# Patient Record
Sex: FEMALE | Race: BLACK OR AFRICAN AMERICAN | Employment: OTHER | URBAN - METROPOLITAN AREA
[De-identification: names, ages, dates, MRNs, and addresses within clinical notes are randomized per-mention and may not be internally consistent; named-entity substitution may affect disease eponyms.]

---

## 2018-11-26 ENCOUNTER — HOSPITAL ENCOUNTER (INPATIENT)
Age: 68
LOS: 4 days | Discharge: HOME | DRG: 292 | End: 2018-11-30
Attending: EMERGENCY MEDICINE | Admitting: INTERNAL MEDICINE
Payer: MEDICARE

## 2018-11-26 ENCOUNTER — APPOINTMENT (OUTPATIENT)
Dept: GENERAL RADIOLOGY | Age: 68
DRG: 292 | End: 2018-11-26
Payer: MEDICARE

## 2018-11-26 DIAGNOSIS — R06.09 DYSPNEA ON EXERTION: Primary | ICD-10-CM

## 2018-11-26 DIAGNOSIS — J44.1 COPD EXACERBATION (HCC): ICD-10-CM

## 2018-11-26 DIAGNOSIS — I50.9 NEW ONSET OF CONGESTIVE HEART FAILURE (HCC): ICD-10-CM

## 2018-11-26 PROBLEM — I50.43 CHF (CONGESTIVE HEART FAILURE), NYHA CLASS I, ACUTE ON CHRONIC, COMBINED (HCC): Status: ACTIVE | Noted: 2018-11-26

## 2018-11-26 LAB
ANION GAP SERPL CALCULATED.3IONS-SCNC: 13 MMOL/L (ref 3–16)
BASOPHILS ABSOLUTE: 0.1 K/UL (ref 0–0.2)
BASOPHILS RELATIVE PERCENT: 0.9 %
BUN BLDV-MCNC: 13 MG/DL (ref 7–20)
CALCIUM SERPL-MCNC: 8.9 MG/DL (ref 8.3–10.6)
CHLORIDE BLD-SCNC: 109 MMOL/L (ref 99–110)
CO2: 22 MMOL/L (ref 21–32)
CREAT SERPL-MCNC: 1 MG/DL (ref 0.6–1.2)
EOSINOPHILS ABSOLUTE: 0.5 K/UL (ref 0–0.6)
EOSINOPHILS RELATIVE PERCENT: 6.5 %
GFR AFRICAN AMERICAN: >60
GFR NON-AFRICAN AMERICAN: 55
GLUCOSE BLD-MCNC: 100 MG/DL (ref 70–99)
HCT VFR BLD CALC: 40.5 % (ref 36–48)
HEMOGLOBIN: 13.2 G/DL (ref 12–16)
LYMPHOCYTES ABSOLUTE: 2.8 K/UL (ref 1–5.1)
LYMPHOCYTES RELATIVE PERCENT: 39.9 %
MCH RBC QN AUTO: 31.5 PG (ref 26–34)
MCHC RBC AUTO-ENTMCNC: 32.7 G/DL (ref 31–36)
MCV RBC AUTO: 96.4 FL (ref 80–100)
MONOCYTES ABSOLUTE: 0.5 K/UL (ref 0–1.3)
MONOCYTES RELATIVE PERCENT: 6.5 %
NEUTROPHILS ABSOLUTE: 3.2 K/UL (ref 1.7–7.7)
NEUTROPHILS RELATIVE PERCENT: 46.2 %
PDW BLD-RTO: 14.7 % (ref 12.4–15.4)
PLATELET # BLD: 204 K/UL (ref 135–450)
PMV BLD AUTO: 7.9 FL (ref 5–10.5)
POTASSIUM REFLEX MAGNESIUM: 4.2 MMOL/L (ref 3.5–5.1)
PRO-BNP: 2157 PG/ML (ref 0–124)
RBC # BLD: 4.2 M/UL (ref 4–5.2)
SODIUM BLD-SCNC: 144 MMOL/L (ref 136–145)
TROPONIN: <0.01 NG/ML
WBC # BLD: 7 K/UL (ref 4–11)

## 2018-11-26 PROCEDURE — 94640 AIRWAY INHALATION TREATMENT: CPT

## 2018-11-26 PROCEDURE — 6370000000 HC RX 637 (ALT 250 FOR IP): Performed by: EMERGENCY MEDICINE

## 2018-11-26 PROCEDURE — 71046 X-RAY EXAM CHEST 2 VIEWS: CPT

## 2018-11-26 PROCEDURE — 93005 ELECTROCARDIOGRAM TRACING: CPT | Performed by: EMERGENCY MEDICINE

## 2018-11-26 PROCEDURE — 84484 ASSAY OF TROPONIN QUANT: CPT

## 2018-11-26 PROCEDURE — 85025 COMPLETE CBC W/AUTO DIFF WBC: CPT

## 2018-11-26 PROCEDURE — 1200000000 HC SEMI PRIVATE

## 2018-11-26 PROCEDURE — 36415 COLL VENOUS BLD VENIPUNCTURE: CPT

## 2018-11-26 PROCEDURE — 94664 DEMO&/EVAL PT USE INHALER: CPT

## 2018-11-26 PROCEDURE — 94760 N-INVAS EAR/PLS OXIMETRY 1: CPT

## 2018-11-26 PROCEDURE — 99285 EMERGENCY DEPT VISIT HI MDM: CPT

## 2018-11-26 PROCEDURE — 83880 ASSAY OF NATRIURETIC PEPTIDE: CPT

## 2018-11-26 PROCEDURE — 6360000002 HC RX W HCPCS: Performed by: PHYSICIAN ASSISTANT

## 2018-11-26 PROCEDURE — 80048 BASIC METABOLIC PNL TOTAL CA: CPT

## 2018-11-26 RX ORDER — DICLOFENAC SODIUM 75 MG/1
75 TABLET, DELAYED RELEASE ORAL 2 TIMES DAILY
Status: ON HOLD | COMMUNITY
End: 2018-11-30 | Stop reason: HOSPADM

## 2018-11-26 RX ORDER — ALBUTEROL SULFATE 2.5 MG/3ML
5 SOLUTION RESPIRATORY (INHALATION) ONCE
Status: COMPLETED | OUTPATIENT
Start: 2018-11-26 | End: 2018-11-26

## 2018-11-26 RX ORDER — PREDNISONE 20 MG/1
60 TABLET ORAL ONCE
Status: COMPLETED | OUTPATIENT
Start: 2018-11-26 | End: 2018-11-26

## 2018-11-26 RX ORDER — LATANOPROST 50 UG/ML
1 SOLUTION/ DROPS OPHTHALMIC NIGHTLY
COMMUNITY

## 2018-11-26 RX ORDER — RANITIDINE 150 MG/1
150 TABLET ORAL 2 TIMES DAILY
COMMUNITY
End: 2020-05-26 | Stop reason: ALTCHOICE

## 2018-11-26 RX ORDER — IPRATROPIUM BROMIDE AND ALBUTEROL SULFATE 2.5; .5 MG/3ML; MG/3ML
1 SOLUTION RESPIRATORY (INHALATION) ONCE
Status: COMPLETED | OUTPATIENT
Start: 2018-11-26 | End: 2018-11-26

## 2018-11-26 RX ORDER — GABAPENTIN 800 MG/1
400 TABLET ORAL 3 TIMES DAILY
COMMUNITY
End: 2021-11-09

## 2018-11-26 RX ORDER — HYDROCHLOROTHIAZIDE 25 MG/1
25 TABLET ORAL DAILY
Status: ON HOLD | COMMUNITY
End: 2018-11-28 | Stop reason: ALTCHOICE

## 2018-11-26 RX ADMIN — PREDNISONE 60 MG: 20 TABLET ORAL at 14:00

## 2018-11-26 RX ADMIN — ALBUTEROL SULFATE 5 MG: 2.5 SOLUTION RESPIRATORY (INHALATION) at 15:30

## 2018-11-26 RX ADMIN — IPRATROPIUM BROMIDE AND ALBUTEROL SULFATE 1 AMPULE: .5; 3 SOLUTION RESPIRATORY (INHALATION) at 12:43

## 2018-11-26 ASSESSMENT — ENCOUNTER SYMPTOMS
ABDOMINAL PAIN: 0
SHORTNESS OF BREATH: 1
COUGH: 1
DIARRHEA: 0
BACK PAIN: 0
EYE PAIN: 0
VOMITING: 0
WHEEZING: 1
NAUSEA: 0

## 2018-11-26 NOTE — ED PROVIDER NOTES
and Pertinent negatives as per HPI. Except as noted abovein the ROS, all other systems were reviewed and negative. PAST MEDICAL HISTORY     Past Medical History:   Diagnosis Date    Hypertension          SURGICAL HISTORY     Past Surgical History:   Procedure Laterality Date    CARPAL TUNNEL RELEASE      CHOLECYSTECTOMY      KNEE SURGERY      TONSILLECTOMY      TUBAL LIGATION           CURRENTMEDICATIONS       Previous Medications    No medications on file         ALLERGIES     Asa [aspirin]; Pcn [penicillins]; Shellfish allergy; and Sulfa antibiotics    FAMILYHISTORY     History reviewed. No pertinent family history. SOCIAL HISTORY       Social History     Social History    Marital status:      Spouse name: N/A    Number of children: N/A    Years of education: N/A     Social History Main Topics    Smoking status: Current Every Day Smoker     Packs/day: 0.75     Types: Cigarettes    Smokeless tobacco: Never Used    Alcohol use No    Drug use: No    Sexual activity: Not Asked     Other Topics Concern    None     Social History Narrative    None       SCREENINGS             PHYSICAL EXAM    (up to 7 for level 4, 8 or more for level 5)     ED Triage Vitals [11/26/18 1144]   BP Temp Temp src Pulse Resp SpO2 Height Weight   116/73 97 °F (36.1 °C) -- 107 18 92 % 5' 6\" (1.676 m) 285 lb 11.5 oz (129.6 kg)       Physical Exam   Constitutional: She is oriented to person, place, and time. She appears well-developed. No distress. HENT:   Head: Normocephalic and atraumatic. Eyes: Right eye exhibits no discharge. Left eye exhibits no discharge. Neck: Normal range of motion. Neck supple. Pulmonary/Chest: No stridor. No respiratory distress. She has wheezes. Musculoskeletal: Normal range of motion. Neurological: She is alert and oriented to person, place, and time. No gross facial drooping. Moves all 4 extremities spontaneously. Skin: Skin is warm and dry. She is not diaphoretic. 11/26/2018 12:25 pm COMPARISON: Chest x-ray 07/19/2016 HISTORY: ORDERING SYSTEM PROVIDED HISTORY: Cough, SOB TECHNOLOGIST PROVIDED HISTORY: Reason for exam:->Cough, SOB Ordering Physician Provided Reason for Exam: Cough, SOB Acuity: Acute Type of Exam: Initial Additional signs and symptoms: Shortness of Breath (pt c/o difficulty breathing x2 weeks, denies pain) FINDINGS: Mild patchy opacities at the lower lungs. Costophrenic angles are clear. Cardiac silhouette appears enlarged. The bones are intact. Mild patchy opacities at the lower lungs could be due to edema, atelectasis, and/or pneumo. PROCEDURES   Unless otherwise noted below, none     Procedures    CRITICAL CARE TIME   N/A    CONSULTS:  None      EMERGENCY DEPARTMENT COURSE and DIFFERENTIALDIAGNOSIS/MDM:   Vitals:    Vitals:    11/26/18 1434 11/26/18 1531 11/26/18 1532 11/26/18 1547   BP: (!) 125/104  127/83 127/83   Pulse: 101   99   Resp: 23 16 18   Temp:    98.7 °F (37.1 °C)   TempSrc:    Oral   SpO2: 99% 100%  100%   Weight:       Height:           Patient was given thefollowing medications:  Medications   ipratropium-albuterol (DUONEB) nebulizer solution 1 ampule (1 ampule Inhalation Given 11/26/18 1243)   predniSONE (DELTASONE) tablet 60 mg (60 mg Oral Given 11/26/18 1400)   albuterol (PROVENTIL) nebulizer solution 5 mg (5 mg Nebulization Given 11/26/18 1530)         Differential Diagnosis: Acute COPD exacerbation, Hypoxemic Respiratory Distress, Pneumonia, Sepsis, Congestive Heart Failure, Myocardial Infarction, Pulmonary Embolus, ARDS, Pneumothorax, other    Patient is afebrile and nontoxic . Wheezing and decreased breath sounds on exam. Given Patient was given beta agonist nebulizers, steroids. CXR  mild patchy opacities at the lower lungs could be due to any edema atelectasis and/or ammonia. She is afebrile, no white count, no complaints of productive cough or subjective fever and I do not suspect pneumonia.   Her BNP was elevated at 2,157. She has no known history of CHF and he denies any prior workup with echocardiograms or provocative cardiac testing. After prednisone and breathing treatments, she continues to have wheezing on exam.  I will consult the hospitalist for admission for her dyspnea on exertion, possible new onset CHF and COPD. Results for orders placed or performed during the hospital encounter of 11/26/18   CBC Auto Differential   Result Value Ref Range    WBC 7.0 4.0 - 11.0 K/uL    RBC 4.20 4.00 - 5.20 M/uL    Hemoglobin 13.2 12.0 - 16.0 g/dL    Hematocrit 40.5 36.0 - 48.0 %    MCV 96.4 80.0 - 100.0 fL    MCH 31.5 26.0 - 34.0 pg    MCHC 32.7 31.0 - 36.0 g/dL    RDW 14.7 12.4 - 15.4 %    Platelets 596 052 - 631 K/uL    MPV 7.9 5.0 - 10.5 fL    Neutrophils % 46.2 %    Lymphocytes % 39.9 %    Monocytes % 6.5 %    Eosinophils % 6.5 %    Basophils % 0.9 %    Neutrophils # 3.2 1.7 - 7.7 K/uL    Lymphocytes # 2.8 1.0 - 5.1 K/uL    Monocytes # 0.5 0.0 - 1.3 K/uL    Eosinophils # 0.5 0.0 - 0.6 K/uL    Basophils # 0.1 0.0 - 0.2 K/uL   Basic Metabolic Panel w/ Reflex to MG   Result Value Ref Range    Sodium 144 136 - 145 mmol/L    Potassium reflex Magnesium 4.2 3.5 - 5.1 mmol/L    Chloride 109 99 - 110 mmol/L    CO2 22 21 - 32 mmol/L    Anion Gap 13 3 - 16    Glucose 100 (H) 70 - 99 mg/dL    BUN 13 7 - 20 mg/dL    CREATININE 1.0 0.6 - 1.2 mg/dL    GFR Non-African American 55 (A) >60    GFR African American >60 >60    Calcium 8.9 8.3 - 10.6 mg/dL   Troponin   Result Value Ref Range    Troponin <0.01 <0.01 ng/mL   Brain Natriuretic Peptide   Result Value Ref Range    Pro-BNP 2,157 (H) 0 - 124 pg/mL       I spoke with Dr. Soha Grant. We thoroughly discussed the history, physical exam, laboratory and imaging studies, as well as, emergency department course. Based upon that discussion, we've decided to admit [facility] for further observation and evaluation of Holli Siffel's dyspnea.   As I have deemed necessary from their history, physical, and studies, I have considered and evaluated Babs Sandoval for the following diagnoses:  ACUTE CORONARY SYNDROME, CHRONIC OBSTRUCTIVE PULMONARY DISEASE, CONGESTIVE HEART FAILURE, PERICARDIAL TAMPONADE, PNEUMONIA, PNEUMOTHORAX, PULMONARY EMBOLISM, SEPSIS, and THORACIC DISSECTION. FINAL IMPRESSION      1. Dyspnea on exertion    2. COPD exacerbation ([address]    3. New onset of congestive heart failure [facility])          DISPOSITION/PLAN   DISPOSITION Decision To Admit 11/26/2018 03:56:06 PM      PATIENT REFERREDTO:  No follow-up provider specified. DISCHARGE MEDICATIONS:  New Prescriptions    No medications on file       DISCONTINUED MEDICATIONS:  Discontinued Medications    ACETAMINOPHEN (TYLENOL PO)    Take by mouth    DICLOFENAC PO    Take 75 mg by mouth 2 times daily    DICYCLOMINE (BENTYL) 10 MG CAPSULE    Take 1 capsule by mouth 4 times daily as needed (cramping)    GABAPENTIN (NEURONTIN) 600 MG TABLET    Take 800 mg by mouth 3 times daily    HYDROCODONE-ACETAMINOPHEN (NORCO) 5-325 MG PER TABLET    Take 1 tablet by mouth every 6 hours as needed for Pain . LISINOPRIL PO    Take by mouth    LISINOPRIL-HYDROCHLOROTHIAZIDE (PRINZIDE;ZESTORETIC) 10-12.5 MG PER TABLET    Take 1 tablet by mouth daily    NAPROXEN (EC NAPROSYN) 500 MG EC TABLET    Take 1 tablet by mouth 2 times daily (with meals)    ONDANSETRON (ZOFRAN ODT) 4 MG DISINTEGRATING TABLET    Take 1 tablet by mouth every 8 hours as needed for Nausea Let dissolve in mouth.               (Please note that portions ofthis note were completed with a voice recognition program.  Efforts were made to edit the dictations but occasionally words are mis-transcribed.)    NOE Villa (electronically signed)            NOE Villa  11/26/18 Juhi Klein  11/26/18 [id number]

## 2018-11-27 LAB
ANION GAP SERPL CALCULATED.3IONS-SCNC: 14 MMOL/L (ref 3–16)
BUN BLDV-MCNC: 16 MG/DL (ref 7–20)
CALCIUM SERPL-MCNC: 9.4 MG/DL (ref 8.3–10.6)
CHLORIDE BLD-SCNC: 107 MMOL/L (ref 99–110)
CHOLESTEROL, TOTAL: 160 MG/DL (ref 0–199)
CO2: 23 MMOL/L (ref 21–32)
CREAT SERPL-MCNC: 1.1 MG/DL (ref 0.6–1.2)
GFR AFRICAN AMERICAN: 59
GFR NON-AFRICAN AMERICAN: 49
GLUCOSE BLD-MCNC: 138 MG/DL (ref 70–99)
HDLC SERPL-MCNC: 55 MG/DL (ref 40–60)
LDL CHOLESTEROL CALCULATED: 89 MG/DL
MAGNESIUM: 2 MG/DL (ref 1.8–2.4)
POTASSIUM SERPL-SCNC: 4.2 MMOL/L (ref 3.5–5.1)
PROCALCITONIN: 0.03 NG/ML (ref 0–0.15)
SODIUM BLD-SCNC: 144 MMOL/L (ref 136–145)
TRIGL SERPL-MCNC: 81 MG/DL (ref 0–150)
TROPONIN: <0.01 NG/ML
TROPONIN: <0.01 NG/ML
VLDLC SERPL CALC-MCNC: 16 MG/DL

## 2018-11-27 PROCEDURE — 1200000000 HC SEMI PRIVATE

## 2018-11-27 PROCEDURE — 2580000003 HC RX 258: Performed by: INTERNAL MEDICINE

## 2018-11-27 PROCEDURE — 83735 ASSAY OF MAGNESIUM: CPT

## 2018-11-27 PROCEDURE — 6370000000 HC RX 637 (ALT 250 FOR IP): Performed by: INTERNAL MEDICINE

## 2018-11-27 PROCEDURE — 84484 ASSAY OF TROPONIN QUANT: CPT

## 2018-11-27 PROCEDURE — 36415 COLL VENOUS BLD VENIPUNCTURE: CPT

## 2018-11-27 PROCEDURE — 94640 AIRWAY INHALATION TREATMENT: CPT

## 2018-11-27 PROCEDURE — 80061 LIPID PANEL: CPT

## 2018-11-27 PROCEDURE — 93010 ELECTROCARDIOGRAM REPORT: CPT | Performed by: INTERNAL MEDICINE

## 2018-11-27 PROCEDURE — 84145 PROCALCITONIN (PCT): CPT

## 2018-11-27 PROCEDURE — 80048 BASIC METABOLIC PNL TOTAL CA: CPT

## 2018-11-27 PROCEDURE — 6360000002 HC RX W HCPCS: Performed by: INTERNAL MEDICINE

## 2018-11-27 RX ORDER — VALSARTAN 80 MG/1
40 TABLET ORAL EVERY 12 HOURS SCHEDULED
Status: DISCONTINUED | OUTPATIENT
Start: 2018-11-27 | End: 2018-11-28

## 2018-11-27 RX ORDER — FAMOTIDINE 20 MG/1
20 TABLET, FILM COATED ORAL DAILY
Status: DISCONTINUED | OUTPATIENT
Start: 2018-11-27 | End: 2018-11-30 | Stop reason: HOSPADM

## 2018-11-27 RX ORDER — HYDROCHLOROTHIAZIDE 25 MG/1
25 TABLET ORAL DAILY
Status: DISCONTINUED | OUTPATIENT
Start: 2018-11-27 | End: 2018-11-28

## 2018-11-27 RX ORDER — FUROSEMIDE 10 MG/ML
40 INJECTION INTRAMUSCULAR; INTRAVENOUS 2 TIMES DAILY
Status: DISCONTINUED | OUTPATIENT
Start: 2018-11-27 | End: 2018-11-28

## 2018-11-27 RX ORDER — METHYLPREDNISOLONE SODIUM SUCCINATE 40 MG/ML
40 INJECTION, POWDER, LYOPHILIZED, FOR SOLUTION INTRAMUSCULAR; INTRAVENOUS EVERY 6 HOURS
Status: DISCONTINUED | OUTPATIENT
Start: 2018-11-27 | End: 2018-11-27

## 2018-11-27 RX ORDER — ONDANSETRON 2 MG/ML
4 INJECTION INTRAMUSCULAR; INTRAVENOUS EVERY 6 HOURS PRN
Status: DISCONTINUED | OUTPATIENT
Start: 2018-11-27 | End: 2018-11-30 | Stop reason: HOSPADM

## 2018-11-27 RX ORDER — IPRATROPIUM BROMIDE AND ALBUTEROL SULFATE 2.5; .5 MG/3ML; MG/3ML
1 SOLUTION RESPIRATORY (INHALATION)
Status: DISCONTINUED | OUTPATIENT
Start: 2018-11-27 | End: 2018-11-30 | Stop reason: HOSPADM

## 2018-11-27 RX ORDER — SODIUM CHLORIDE 0.9 % (FLUSH) 0.9 %
10 SYRINGE (ML) INJECTION EVERY 12 HOURS SCHEDULED
Status: DISCONTINUED | OUTPATIENT
Start: 2018-11-27 | End: 2018-11-30 | Stop reason: HOSPADM

## 2018-11-27 RX ORDER — SODIUM CHLORIDE 0.9 % (FLUSH) 0.9 %
10 SYRINGE (ML) INJECTION PRN
Status: DISCONTINUED | OUTPATIENT
Start: 2018-11-27 | End: 2018-11-30 | Stop reason: HOSPADM

## 2018-11-27 RX ORDER — LATANOPROST 50 UG/ML
1 SOLUTION/ DROPS OPHTHALMIC NIGHTLY
Status: DISCONTINUED | OUTPATIENT
Start: 2018-11-27 | End: 2018-11-30 | Stop reason: HOSPADM

## 2018-11-27 RX ORDER — ACETAMINOPHEN 500 MG
1000 TABLET ORAL EVERY 6 HOURS PRN
Status: DISCONTINUED | OUTPATIENT
Start: 2018-11-27 | End: 2018-11-30 | Stop reason: HOSPADM

## 2018-11-27 RX ORDER — GABAPENTIN 400 MG/1
800 CAPSULE ORAL 3 TIMES DAILY
Status: DISCONTINUED | OUTPATIENT
Start: 2018-11-27 | End: 2018-11-30 | Stop reason: HOSPADM

## 2018-11-27 RX ORDER — ALBUTEROL SULFATE 90 UG/1
1 AEROSOL, METERED RESPIRATORY (INHALATION) EVERY 6 HOURS PRN
Status: DISCONTINUED | OUTPATIENT
Start: 2018-11-27 | End: 2018-11-30 | Stop reason: HOSPADM

## 2018-11-27 RX ADMIN — IPRATROPIUM BROMIDE AND ALBUTEROL SULFATE 1 AMPULE: 2.5; .5 SOLUTION RESPIRATORY (INHALATION) at 12:41

## 2018-11-27 RX ADMIN — Medication 10 ML: at 08:25

## 2018-11-27 RX ADMIN — ENOXAPARIN SODIUM 40 MG: 40 INJECTION SUBCUTANEOUS at 08:25

## 2018-11-27 RX ADMIN — FUROSEMIDE 40 MG: 10 INJECTION, SOLUTION INTRAMUSCULAR; INTRAVENOUS at 08:27

## 2018-11-27 RX ADMIN — IPRATROPIUM BROMIDE AND ALBUTEROL SULFATE 1 AMPULE: 2.5; .5 SOLUTION RESPIRATORY (INHALATION) at 20:06

## 2018-11-27 RX ADMIN — MOMETASONE FUROATE AND FORMOTEROL FUMARATE DIHYDRATE 2 PUFF: 100; 5 AEROSOL RESPIRATORY (INHALATION) at 08:00

## 2018-11-27 RX ADMIN — FAMOTIDINE 20 MG: 20 TABLET ORAL at 08:26

## 2018-11-27 RX ADMIN — METHYLPREDNISOLONE SODIUM SUCCINATE 40 MG: 40 INJECTION, POWDER, FOR SOLUTION INTRAMUSCULAR; INTRAVENOUS at 00:46

## 2018-11-27 RX ADMIN — GABAPENTIN 800 MG: 400 CAPSULE ORAL at 16:35

## 2018-11-27 RX ADMIN — ACETAMINOPHEN 1000 MG: 500 TABLET ORAL at 16:41

## 2018-11-27 RX ADMIN — IPRATROPIUM BROMIDE AND ALBUTEROL SULFATE 1 AMPULE: 2.5; .5 SOLUTION RESPIRATORY (INHALATION) at 07:58

## 2018-11-27 RX ADMIN — FUROSEMIDE 40 MG: 10 INJECTION, SOLUTION INTRAMUSCULAR; INTRAVENOUS at 00:46

## 2018-11-27 RX ADMIN — FUROSEMIDE 40 MG: 10 INJECTION, SOLUTION INTRAMUSCULAR; INTRAVENOUS at 17:02

## 2018-11-27 RX ADMIN — LATANOPROST 1 DROP: 50 SOLUTION OPHTHALMIC at 23:46

## 2018-11-27 RX ADMIN — METHYLPREDNISOLONE SODIUM SUCCINATE 40 MG: 40 INJECTION, POWDER, FOR SOLUTION INTRAMUSCULAR; INTRAVENOUS at 08:25

## 2018-11-27 RX ADMIN — IPRATROPIUM BROMIDE AND ALBUTEROL SULFATE 1 AMPULE: 2.5; .5 SOLUTION RESPIRATORY (INHALATION) at 16:28

## 2018-11-27 RX ADMIN — GABAPENTIN 800 MG: 400 CAPSULE ORAL at 08:26

## 2018-11-27 RX ADMIN — GABAPENTIN 800 MG: 400 CAPSULE ORAL at 00:46

## 2018-11-27 RX ADMIN — MOMETASONE FUROATE AND FORMOTEROL FUMARATE DIHYDRATE 2 PUFF: 100; 5 AEROSOL RESPIRATORY (INHALATION) at 20:06

## 2018-11-27 RX ADMIN — VALSARTAN 40 MG: 80 TABLET, FILM COATED ORAL at 23:43

## 2018-11-27 RX ADMIN — ACETAMINOPHEN 1000 MG: 500 TABLET ORAL at 01:09

## 2018-11-27 RX ADMIN — GABAPENTIN 800 MG: 400 CAPSULE ORAL at 23:42

## 2018-11-27 RX ADMIN — Medication 10 ML: at 23:44

## 2018-11-27 RX ADMIN — HYDROCHLOROTHIAZIDE 25 MG: 25 TABLET ORAL at 08:26

## 2018-11-27 RX ADMIN — VALSARTAN 40 MG: 80 TABLET, FILM COATED ORAL at 08:26

## 2018-11-27 RX ADMIN — ACETAMINOPHEN 1000 MG: 500 TABLET ORAL at 23:43

## 2018-11-27 ASSESSMENT — PAIN DESCRIPTION - ORIENTATION: ORIENTATION: LEFT

## 2018-11-27 ASSESSMENT — PAIN SCALES - GENERAL
PAINLEVEL_OUTOF10: 5
PAINLEVEL_OUTOF10: 0
PAINLEVEL_OUTOF10: 7
PAINLEVEL_OUTOF10: 0
PAINLEVEL_OUTOF10: 0
PAINLEVEL_OUTOF10: 6
PAINLEVEL_OUTOF10: 5

## 2018-11-27 ASSESSMENT — PAIN DESCRIPTION - DESCRIPTORS: DESCRIPTORS: ACHING;SORE

## 2018-11-27 ASSESSMENT — PAIN DESCRIPTION - PAIN TYPE: TYPE: CHRONIC PAIN

## 2018-11-27 ASSESSMENT — PAIN DESCRIPTION - LOCATION: LOCATION: SHOULDER

## 2018-11-27 NOTE — PROGRESS NOTES
exacerbation secondary to #1  - Holding steroids at this time  - Recently diagnosed  - Continue duoneb treatments    Essential hypertension  - Continue high chlorothiazide    GERD  - Continue Zantac    Morbidly obese  - BMI 46      Body mass index is 46.05 kg/m². The patient and / or the family were informed of the results of any tests, a time was given to answer questions, a plan was proposed and they agreed with plan. DVT prophylaxis: [x] Lovenox  [] SQ Heparin  [] SCDs because of  [] warfarin/oral direct thrombin inhibitor [] Encourage ambulation    GI prophylaxis: [x] [id number]  [] not indicated    Probiotic if on abx: [] Yes [] No [x] Not Indicated    Diet: DIET LOW SODIUM 2 GM;    Consults:  IP CONSULT TO HEART FAILURE NURSE/COORDINATOR  IP CONSULT TO DIETITIAN    Disposition:  [] Home  [] Home with home health [] Rehab [] Psych [] SNF  [] LTAC  [] Long term nursing home or group home [] Transfer to ICU  [] Transfer to PCU [x] Other: inpatient    Code Status: Full Code    ELOS: 1-2 days    No future appointments.     Germán Patel, AIMEE - HEMANTH  11/27/18

## 2018-11-27 NOTE — PROGRESS NOTES
Medication Reconciliation    List of medications patient is currently taking is complete. Source of information: 1. Dispense history     Allergies  Asa [aspirin]; Pcn [penicillins]; Shellfish allergy; and Sulfa antibiotics     Notes regarding home medications:   1. Med Red updated by RN overnight. Med list is c/w patient's dispense history.

## 2018-11-27 NOTE — H&P
allergy; and Sulfa antibiotics    Social History:      The patient currently lives with family    TOBACCO:   reports that she has been smoking Cigarettes. She has been smoking about 0.75 packs per day. She has never used smokeless tobacco.  ETOH:   reports that she does not drink alcohol. Family History:       Reviewed in detail and negative for DM, CAD, Cancer, CVA. Positive as follows:    History reviewed. No pertinent family history. REVIEW OF SYSTEMS:   Pertinent positives as noted in the HPI. All other systems reviewed and negative. PHYSICAL EXAM PERFORMED:    BP (!) 112/95   Pulse 110   Temp 98.7 °F (37.1 °C) (Oral)   Resp 18   Ht 5' 6\" (1.676 m)   Wt 285 lb 11.5 oz (129.6 kg)   SpO2 100%   BMI 46.12 kg/m²     General appearance:  No apparent distress, appears stated age and cooperative. HEENT:  Normal cephalic, atraumatic without obvious deformity. Pupils equal, round, and reactive to light. Extra ocular muscles intact. Conjunctivae/corneas clear. Neck: Supple, with full range of motion. No jugular venous distention. Trachea midline. Respiratory:  Decreased bs bilaterally with expiratory wheezes. .  Cardiovascular:  Sinus tachycardia  Abdomen: Soft, non-tender, non-distended with normal bowel sounds. Musculoskeletal:  No clubbing, cyanosis or edema bilaterally. Full range of motion without deformity. Skin: Skin color, texture, turgor normal.  No rashes or lesions. Neurologic:  Neurovascularly intact without any focal sensory/motor deficits.  Cranial nerves: II-XII intact, grossly non-focal.  Psychiatric:  Alert and oriented, thought content appropriate, normal insight  Capillary Refill: Brisk,< 3 seconds   Peripheral Pulses: +2 palpable, equal bilaterally   Ext: -c/c/e    Labs:     Recent Labs      11/26/18   1342   WBC  7.0   HGB  13.2   HCT  40.5   PLT  204     Recent Labs      11/26/18   1342   NA  144   K  4.2   CL  109   CO2  22   BUN  13   CREATININE  1.0   CALCIUM  8.9     No

## 2018-11-28 LAB
ANION GAP SERPL CALCULATED.3IONS-SCNC: 14 MMOL/L (ref 3–16)
BUN BLDV-MCNC: 23 MG/DL (ref 7–20)
CALCIUM SERPL-MCNC: 9.2 MG/DL (ref 8.3–10.6)
CHLORIDE BLD-SCNC: 103 MMOL/L (ref 99–110)
CO2: 24 MMOL/L (ref 21–32)
CREAT SERPL-MCNC: 1.2 MG/DL (ref 0.6–1.2)
GFR AFRICAN AMERICAN: 54
GFR NON-AFRICAN AMERICAN: 44
GLUCOSE BLD-MCNC: 102 MG/DL (ref 70–99)
LV EF: 30 %
LVEF MODALITY: NORMAL
MAGNESIUM: 2.1 MG/DL (ref 1.8–2.4)
POTASSIUM SERPL-SCNC: 4 MMOL/L (ref 3.5–5.1)
PRO-BNP: 1911 PG/ML (ref 0–124)
SODIUM BLD-SCNC: 141 MMOL/L (ref 136–145)

## 2018-11-28 PROCEDURE — 6360000002 HC RX W HCPCS: Performed by: INTERNAL MEDICINE

## 2018-11-28 PROCEDURE — 36415 COLL VENOUS BLD VENIPUNCTURE: CPT

## 2018-11-28 PROCEDURE — 94640 AIRWAY INHALATION TREATMENT: CPT

## 2018-11-28 PROCEDURE — 80048 BASIC METABOLIC PNL TOTAL CA: CPT

## 2018-11-28 PROCEDURE — 2580000003 HC RX 258: Performed by: INTERNAL MEDICINE

## 2018-11-28 PROCEDURE — 83880 ASSAY OF NATRIURETIC PEPTIDE: CPT

## 2018-11-28 PROCEDURE — 6370000000 HC RX 637 (ALT 250 FOR IP): Performed by: INTERNAL MEDICINE

## 2018-11-28 PROCEDURE — 94760 N-INVAS EAR/PLS OXIMETRY 1: CPT

## 2018-11-28 PROCEDURE — 1200000000 HC SEMI PRIVATE

## 2018-11-28 PROCEDURE — 94664 DEMO&/EVAL PT USE INHALER: CPT

## 2018-11-28 PROCEDURE — C8929 TTE W OR WO FOL WCON,DOPPLER: HCPCS

## 2018-11-28 PROCEDURE — 6360000004 HC RX CONTRAST MEDICATION: Performed by: INTERNAL MEDICINE

## 2018-11-28 PROCEDURE — 83735 ASSAY OF MAGNESIUM: CPT

## 2018-11-28 RX ORDER — FUROSEMIDE 10 MG/ML
40 INJECTION INTRAMUSCULAR; INTRAVENOUS DAILY
Status: DISCONTINUED | OUTPATIENT
Start: 2018-11-29 | End: 2018-11-29

## 2018-11-28 RX ADMIN — Medication 10 ML: at 21:20

## 2018-11-28 RX ADMIN — Medication 10 ML: at 09:09

## 2018-11-28 RX ADMIN — IPRATROPIUM BROMIDE AND ALBUTEROL SULFATE 1 AMPULE: 2.5; .5 SOLUTION RESPIRATORY (INHALATION) at 19:52

## 2018-11-28 RX ADMIN — ACETAMINOPHEN 1000 MG: 500 TABLET ORAL at 21:24

## 2018-11-28 RX ADMIN — IPRATROPIUM BROMIDE AND ALBUTEROL SULFATE 1 AMPULE: 2.5; .5 SOLUTION RESPIRATORY (INHALATION) at 08:45

## 2018-11-28 RX ADMIN — FAMOTIDINE 20 MG: 20 TABLET ORAL at 09:09

## 2018-11-28 RX ADMIN — ENOXAPARIN SODIUM 40 MG: 40 INJECTION SUBCUTANEOUS at 09:09

## 2018-11-28 RX ADMIN — MOMETASONE FUROATE AND FORMOTEROL FUMARATE DIHYDRATE 2 PUFF: 100; 5 AEROSOL RESPIRATORY (INHALATION) at 08:45

## 2018-11-28 RX ADMIN — GABAPENTIN 800 MG: 400 CAPSULE ORAL at 15:13

## 2018-11-28 RX ADMIN — MOMETASONE FUROATE AND FORMOTEROL FUMARATE DIHYDRATE 2 PUFF: 100; 5 AEROSOL RESPIRATORY (INHALATION) at 19:52

## 2018-11-28 RX ADMIN — GABAPENTIN 800 MG: 400 CAPSULE ORAL at 09:09

## 2018-11-28 RX ADMIN — PERFLUTREN 1.65 MG: 6.52 INJECTION, SUSPENSION INTRAVENOUS at 13:22

## 2018-11-28 RX ADMIN — IPRATROPIUM BROMIDE AND ALBUTEROL SULFATE 1 AMPULE: 2.5; .5 SOLUTION RESPIRATORY (INHALATION) at 12:34

## 2018-11-28 RX ADMIN — HYDROCHLOROTHIAZIDE 25 MG: 25 TABLET ORAL at 10:48

## 2018-11-28 RX ADMIN — LATANOPROST 1 DROP: 50 SOLUTION OPHTHALMIC at 21:20

## 2018-11-28 RX ADMIN — IPRATROPIUM BROMIDE AND ALBUTEROL SULFATE 1 AMPULE: 2.5; .5 SOLUTION RESPIRATORY (INHALATION) at 16:41

## 2018-11-28 RX ADMIN — GABAPENTIN 800 MG: 400 CAPSULE ORAL at 21:20

## 2018-11-28 ASSESSMENT — PAIN SCALES - GENERAL: PAINLEVEL_OUTOF10: 7

## 2018-11-28 NOTE — PLAN OF CARE
Problem: OXYGENATION/RESPIRATORY FUNCTION  Goal: Patient will achieve/maintain normal respiratory rate/effort  Respiratory rate and effort will be within normal limits for the patient  Outcome: Ongoing  Will continue to monitor patient per shift. Intervention: ASSESS RESPIRATORY RATE, EFFORT AND BREATHING PATTERN  Respiratory status wnl. Denies any shortness of breath at this time. Problem: ACTIVITY INTOLERANCE/IMPAIRED MOBILITY  Goal: Mobility/activity is maintained at optimum level for patient  Outcome: Ongoing  Patient ambulates with cane independently.

## 2018-11-29 PROBLEM — I50.23 ACUTE ON CHRONIC SYSTOLIC HEART FAILURE, NYHA CLASS 3 (HCC): Status: ACTIVE | Noted: 2018-11-26

## 2018-11-29 LAB
ANION GAP SERPL CALCULATED.3IONS-SCNC: 13 MMOL/L (ref 3–16)
BUN BLDV-MCNC: 21 MG/DL (ref 7–20)
CALCIUM SERPL-MCNC: 9.3 MG/DL (ref 8.3–10.6)
CHLORIDE BLD-SCNC: 100 MMOL/L (ref 99–110)
CO2: 26 MMOL/L (ref 21–32)
CREAT SERPL-MCNC: 1 MG/DL (ref 0.6–1.2)
GFR AFRICAN AMERICAN: >60
GFR NON-AFRICAN AMERICAN: 55
GLUCOSE BLD-MCNC: 85 MG/DL (ref 70–99)
MAGNESIUM: 2.1 MG/DL (ref 1.8–2.4)
POTASSIUM SERPL-SCNC: 3.7 MMOL/L (ref 3.5–5.1)
SODIUM BLD-SCNC: 139 MMOL/L (ref 136–145)

## 2018-11-29 PROCEDURE — 80048 BASIC METABOLIC PNL TOTAL CA: CPT

## 2018-11-29 PROCEDURE — 6370000000 HC RX 637 (ALT 250 FOR IP): Performed by: INTERNAL MEDICINE

## 2018-11-29 PROCEDURE — 83735 ASSAY OF MAGNESIUM: CPT

## 2018-11-29 PROCEDURE — 1200000000 HC SEMI PRIVATE

## 2018-11-29 PROCEDURE — 6360000002 HC RX W HCPCS: Performed by: INTERNAL MEDICINE

## 2018-11-29 PROCEDURE — 6360000002 HC RX W HCPCS: Performed by: NURSE PRACTITIONER

## 2018-11-29 PROCEDURE — 36415 COLL VENOUS BLD VENIPUNCTURE: CPT

## 2018-11-29 PROCEDURE — 94640 AIRWAY INHALATION TREATMENT: CPT

## 2018-11-29 PROCEDURE — 99223 1ST HOSP IP/OBS HIGH 75: CPT | Performed by: INTERNAL MEDICINE

## 2018-11-29 PROCEDURE — 94760 N-INVAS EAR/PLS OXIMETRY 1: CPT

## 2018-11-29 PROCEDURE — 2580000003 HC RX 258: Performed by: INTERNAL MEDICINE

## 2018-11-29 RX ORDER — ATORVASTATIN CALCIUM 10 MG/1
10 TABLET, FILM COATED ORAL DAILY
Status: DISCONTINUED | OUTPATIENT
Start: 2018-11-30 | End: 2018-11-30 | Stop reason: HOSPADM

## 2018-11-29 RX ORDER — POTASSIUM CHLORIDE 750 MG/1
20 TABLET, FILM COATED, EXTENDED RELEASE ORAL ONCE
Status: COMPLETED | OUTPATIENT
Start: 2018-11-29 | End: 2018-11-29

## 2018-11-29 RX ORDER — FUROSEMIDE 10 MG/ML
40 INJECTION INTRAMUSCULAR; INTRAVENOUS 2 TIMES DAILY
Status: DISCONTINUED | OUTPATIENT
Start: 2018-11-29 | End: 2018-11-30 | Stop reason: HOSPADM

## 2018-11-29 RX ORDER — CARVEDILOL 3.12 MG/1
3.12 TABLET ORAL 2 TIMES DAILY WITH MEALS
Status: DISCONTINUED | OUTPATIENT
Start: 2018-11-29 | End: 2018-11-30 | Stop reason: HOSPADM

## 2018-11-29 RX ORDER — LISINOPRIL 5 MG/1
2.5 TABLET ORAL DAILY
Status: DISCONTINUED | OUTPATIENT
Start: 2018-11-30 | End: 2018-11-29

## 2018-11-29 RX ORDER — LISINOPRIL 5 MG/1
5 TABLET ORAL DAILY
Status: DISCONTINUED | OUTPATIENT
Start: 2018-11-30 | End: 2018-11-30 | Stop reason: HOSPADM

## 2018-11-29 RX ADMIN — GABAPENTIN 800 MG: 400 CAPSULE ORAL at 21:58

## 2018-11-29 RX ADMIN — LATANOPROST 1 DROP: 50 SOLUTION OPHTHALMIC at 21:58

## 2018-11-29 RX ADMIN — Medication 10 ML: at 21:58

## 2018-11-29 RX ADMIN — Medication 10 ML: at 08:18

## 2018-11-29 RX ADMIN — ENOXAPARIN SODIUM 40 MG: 40 INJECTION SUBCUTANEOUS at 08:18

## 2018-11-29 RX ADMIN — MOMETASONE FUROATE AND FORMOTEROL FUMARATE DIHYDRATE 2 PUFF: 100; 5 AEROSOL RESPIRATORY (INHALATION) at 07:53

## 2018-11-29 RX ADMIN — IPRATROPIUM BROMIDE AND ALBUTEROL SULFATE 1 AMPULE: 2.5; .5 SOLUTION RESPIRATORY (INHALATION) at 11:37

## 2018-11-29 RX ADMIN — MOMETASONE FUROATE AND FORMOTEROL FUMARATE DIHYDRATE 2 PUFF: 100; 5 AEROSOL RESPIRATORY (INHALATION) at 20:46

## 2018-11-29 RX ADMIN — FUROSEMIDE 40 MG: 10 INJECTION, SOLUTION INTRAMUSCULAR; INTRAVENOUS at 08:18

## 2018-11-29 RX ADMIN — IPRATROPIUM BROMIDE AND ALBUTEROL SULFATE 1 AMPULE: 2.5; .5 SOLUTION RESPIRATORY (INHALATION) at 16:58

## 2018-11-29 RX ADMIN — GABAPENTIN 800 MG: 400 CAPSULE ORAL at 14:32

## 2018-11-29 RX ADMIN — IPRATROPIUM BROMIDE AND ALBUTEROL SULFATE 1 AMPULE: 2.5; .5 SOLUTION RESPIRATORY (INHALATION) at 07:52

## 2018-11-29 RX ADMIN — CARVEDILOL 3.12 MG: 3.12 TABLET, FILM COATED ORAL at 18:43

## 2018-11-29 RX ADMIN — FAMOTIDINE 20 MG: 20 TABLET ORAL at 08:18

## 2018-11-29 RX ADMIN — POTASSIUM CHLORIDE 20 MEQ: 750 TABLET, EXTENDED RELEASE ORAL at 18:43

## 2018-11-29 RX ADMIN — FUROSEMIDE 40 MG: 10 INJECTION, SOLUTION INTRAMUSCULAR; INTRAVENOUS at 18:43

## 2018-11-29 RX ADMIN — IPRATROPIUM BROMIDE AND ALBUTEROL SULFATE 1 AMPULE: 2.5; .5 SOLUTION RESPIRATORY (INHALATION) at 20:46

## 2018-11-29 RX ADMIN — GABAPENTIN 800 MG: 400 CAPSULE ORAL at 08:18

## 2018-11-29 ASSESSMENT — PAIN SCALES - GENERAL: PAINLEVEL_OUTOF10: 0

## 2018-11-29 NOTE — PROGRESS NOTES
Notified NP that patient had 11 beats of V -tach. Patient is asymptomatic and sitting up on the side of bed. No new orders at this time. Will continue to monitor.

## 2018-11-29 NOTE — PLAN OF CARE
Problem: Falls - Risk of:  Goal: Will remain free from falls  Will remain free from falls   Outcome: Ongoing  Fall risk assessed. Precautions in place. Bed low and locked with side rails up x2. Nonskid socks on when OOB. Pt refuses bed alarm. Agrees to call for assistance as needed. Family at the bedside. Call light within reach. Frequent checks performed. No falls at this time. Problem: Pain:  Goal: Pain level will decrease  Pain level will decrease   Outcome: Ongoing  Pain level assessed. Pt able to rate pain on a scale of 0-10. Administered PRN analgesic per order. Reposition for comfort. Reassessed for effectiveness. Will continue to monitor.

## 2018-11-30 VITALS
WEIGHT: 276.9 LBS | HEART RATE: 103 BPM | RESPIRATION RATE: 16 BRPM | BODY MASS INDEX: 44.5 KG/M2 | SYSTOLIC BLOOD PRESSURE: 124 MMHG | TEMPERATURE: 97.5 F | OXYGEN SATURATION: 90 % | DIASTOLIC BLOOD PRESSURE: 85 MMHG | HEIGHT: 66 IN

## 2018-11-30 LAB
ANION GAP SERPL CALCULATED.3IONS-SCNC: 14 MMOL/L (ref 3–16)
BUN BLDV-MCNC: 25 MG/DL (ref 7–20)
CALCIUM SERPL-MCNC: 9.1 MG/DL (ref 8.3–10.6)
CHLORIDE BLD-SCNC: 101 MMOL/L (ref 99–110)
CO2: 27 MMOL/L (ref 21–32)
CREAT SERPL-MCNC: 1.2 MG/DL (ref 0.6–1.2)
GFR AFRICAN AMERICAN: 54
GFR NON-AFRICAN AMERICAN: 44
GLUCOSE BLD-MCNC: 92 MG/DL (ref 70–99)
MAGNESIUM: 2.1 MG/DL (ref 1.8–2.4)
POTASSIUM SERPL-SCNC: 4 MMOL/L (ref 3.5–5.1)
PRO-BNP: 1578 PG/ML (ref 0–124)
SODIUM BLD-SCNC: 142 MMOL/L (ref 136–145)

## 2018-11-30 PROCEDURE — 6370000000 HC RX 637 (ALT 250 FOR IP): Performed by: INTERNAL MEDICINE

## 2018-11-30 PROCEDURE — 94760 N-INVAS EAR/PLS OXIMETRY 1: CPT

## 2018-11-30 PROCEDURE — 2580000003 HC RX 258: Performed by: INTERNAL MEDICINE

## 2018-11-30 PROCEDURE — 6360000002 HC RX W HCPCS: Performed by: INTERNAL MEDICINE

## 2018-11-30 PROCEDURE — 80048 BASIC METABOLIC PNL TOTAL CA: CPT

## 2018-11-30 PROCEDURE — 94640 AIRWAY INHALATION TREATMENT: CPT

## 2018-11-30 PROCEDURE — 36415 COLL VENOUS BLD VENIPUNCTURE: CPT

## 2018-11-30 PROCEDURE — 6370000000 HC RX 637 (ALT 250 FOR IP): Performed by: NURSE PRACTITIONER

## 2018-11-30 PROCEDURE — 83880 ASSAY OF NATRIURETIC PEPTIDE: CPT

## 2018-11-30 PROCEDURE — 99232 SBSQ HOSP IP/OBS MODERATE 35: CPT | Performed by: INTERNAL MEDICINE

## 2018-11-30 PROCEDURE — 83735 ASSAY OF MAGNESIUM: CPT

## 2018-11-30 RX ORDER — CARVEDILOL 3.12 MG/1
3.12 TABLET ORAL 2 TIMES DAILY WITH MEALS
Qty: 60 TABLET | Refills: 0 | Status: SHIPPED | OUTPATIENT
Start: 2018-11-30 | End: 2018-12-12 | Stop reason: SDUPTHER

## 2018-11-30 RX ORDER — ATORVASTATIN CALCIUM 10 MG/1
10 TABLET, FILM COATED ORAL NIGHTLY
Qty: 30 TABLET | Refills: 0 | Status: SHIPPED | OUTPATIENT
Start: 2018-11-30 | End: 2018-12-12 | Stop reason: SDUPTHER

## 2018-11-30 RX ORDER — FUROSEMIDE 40 MG/1
40 TABLET ORAL DAILY
Status: DISCONTINUED | OUTPATIENT
Start: 2018-11-30 | End: 2018-11-30 | Stop reason: HOSPADM

## 2018-11-30 RX ORDER — LISINOPRIL 5 MG/1
5 TABLET ORAL DAILY
Qty: 30 TABLET | Refills: 0 | Status: SHIPPED | OUTPATIENT
Start: 2018-12-01 | End: 2018-12-12 | Stop reason: SDUPTHER

## 2018-11-30 RX ORDER — FUROSEMIDE 40 MG/1
40 TABLET ORAL DAILY
Qty: 30 TABLET | Refills: 0 | Status: SHIPPED | OUTPATIENT
Start: 2018-12-01 | End: 2018-12-12 | Stop reason: SDUPTHER

## 2018-11-30 RX ADMIN — Medication 10 ML: at 09:08

## 2018-11-30 RX ADMIN — GABAPENTIN 800 MG: 400 CAPSULE ORAL at 16:42

## 2018-11-30 RX ADMIN — ATORVASTATIN CALCIUM 10 MG: 10 TABLET, FILM COATED ORAL at 09:05

## 2018-11-30 RX ADMIN — GABAPENTIN 800 MG: 400 CAPSULE ORAL at 09:05

## 2018-11-30 RX ADMIN — MOMETASONE FUROATE AND FORMOTEROL FUMARATE DIHYDRATE 2 PUFF: 100; 5 AEROSOL RESPIRATORY (INHALATION) at 08:47

## 2018-11-30 RX ADMIN — IPRATROPIUM BROMIDE AND ALBUTEROL SULFATE 1 AMPULE: 2.5; .5 SOLUTION RESPIRATORY (INHALATION) at 12:03

## 2018-11-30 RX ADMIN — CARVEDILOL 3.12 MG: 3.12 TABLET, FILM COATED ORAL at 10:58

## 2018-11-30 RX ADMIN — FUROSEMIDE 40 MG: 40 TABLET ORAL at 10:58

## 2018-11-30 RX ADMIN — ENOXAPARIN SODIUM 40 MG: 40 INJECTION SUBCUTANEOUS at 09:07

## 2018-11-30 RX ADMIN — ACETAMINOPHEN 1000 MG: 500 TABLET ORAL at 04:17

## 2018-11-30 RX ADMIN — FAMOTIDINE 20 MG: 20 TABLET ORAL at 09:05

## 2018-11-30 RX ADMIN — IPRATROPIUM BROMIDE AND ALBUTEROL SULFATE 1 AMPULE: 2.5; .5 SOLUTION RESPIRATORY (INHALATION) at 08:47

## 2018-11-30 ASSESSMENT — PAIN DESCRIPTION - DESCRIPTORS
DESCRIPTORS: ACHING

## 2018-11-30 ASSESSMENT — PAIN SCALES - GENERAL
PAINLEVEL_OUTOF10: 0
PAINLEVEL_OUTOF10: 5
PAINLEVEL_OUTOF10: 6
PAINLEVEL_OUTOF10: 7
PAINLEVEL_OUTOF10: 0

## 2018-11-30 ASSESSMENT — PAIN DESCRIPTION - PAIN TYPE
TYPE: ACUTE PAIN;CHRONIC PAIN

## 2018-11-30 ASSESSMENT — PAIN DESCRIPTION - ORIENTATION: ORIENTATION: MID

## 2018-11-30 ASSESSMENT — PAIN DESCRIPTION - LOCATION
LOCATION: SHOULDER

## 2018-11-30 ASSESSMENT — PAIN DESCRIPTION - FREQUENCY: FREQUENCY: INTERMITTENT

## 2018-11-30 ASSESSMENT — PAIN DESCRIPTION - ONSET: ONSET: ON-GOING

## 2018-11-30 NOTE — DISCHARGE SUMMARY
MCG/ACT AERS inhaler Inhale 1 puff into the lungs every 6 hours as needed for Wheezing or Shortness of Breath      vitamin D (CHOLECALCIFEROL) 1000 units TABS tablet Take 1,000 Units by mouth daily      hydrocortisone 1 % cream Apply 1 each topically 3 times daily as needed (itching/rash)             The patient was seen and examined on day of discharge and this discharge summary is in conjunction with any daily progress note from day of discharge. Hospital Course: This is a 21-year-old woman who presented to the ED with complaints of shortness of breath. Having PND and 4 pillow orthopnea. She was found to have a heart failure episode. She was diuresed and had some mild hypotension but tolerated well. Echocardiogram was performed showing a severely reduced ejection fraction with global hypokinesis and and EF of 30%. Cardiology was consulted. She was started on low-dose lisinopril, Coreg and statin therapy. Today her shortness of breath is improved and her swelling has subsided. She had a weight loss of 10 pounds. With a net negative balance of -5 L. She was seen by the heart failure nurse. At this time symptoms have radically improved. She will follow up with cardiology as an outpatient. She's also been instructed to follow-up with her primary care physician. She has verbalized understanding and is ready for discharge at this time. We discussed smoking cessation.      Exam:     /63   Pulse 105   Temp 97.6 °F (36.4 °C) (Oral)   Resp 15   Ht 5' 6\" (1.676 m)   Wt 276 lb 14.4 oz (125.6 kg)   SpO2 92%   BMI 44.69 kg/m²     Constitutional: Alert and oriented ×4, no acute distress noted, obese  HEENT: Normocephalic, wearing glasses, frequent midline, mucous membranes moist  Cardiac: S1 and S2 noted without gallop or rub  Respiratory: Clear to auscultation all fields with good respiratory effort  GI: Round, nontender with positive bowel sounds all 4 quadrants  Extremities: Trace to no bilateral lower

## 2018-11-30 NOTE — PROGRESS NOTES
sounds  Extremities:  No LE edema    Cardiac Diagnosis:  hypertension, hyperlipidemia, CHF and peripheral vascular disease    Medications:    furosemide  40 mg Oral Daily    furosemide  40 mg Intravenous BID    lisinopril  5 mg Oral Daily    carvedilol  3.125 mg Oral BID WC    atorvastatin  10 mg Oral Daily    gabapentin  800 mg Oral TID    latanoprost  1 drop Both Eyes Nightly    famotidine  20 mg Oral Daily    sodium chloride flush  10 mL Intravenous 2 times per day    enoxaparin  40 mg Subcutaneous Daily    ipratropium-albuterol  1 ampule Inhalation Q4H WA    mometasone-formoterol  2 puff Inhalation BID       sodium chloride flush, magnesium hydroxide, ondansetron, acetaminophen, albuterol sulfate HFA **AND** ipratropium **AND** MDI Treatment    Lab Data:  CBC: No results for input(s): WBC, HGB, PLT in the last 72 hours. BMP:  Recent Labs      11/28/18   0736  11/29/18   0945  11/30/18   0539   NA  141  139  142   K  4.0  3.7  4.0   CO2  24  26  27   BUN  23*  21*  25*   CREATININE  1.2  1.0  1.2     LIVR: No results for input(s): AST, ALT in the last 72 hours. INR:  No results for input(s): INR in the last 72 hours. APTT: No results for input(s): APTT in the last 72 hours. BNP:  No results for input(s): BNP in the last 72 hours. Imaging:Echo: 11/28/18  Suboptimal image quality. Definity contrast administered. Left ventricular cavity size is mildly dilated. Normal left ventricular wall thickness. Overall left ventricular systolic function appears severely reduced. Ejection fraction is visually estimated to be 30%. There is moderate diffuse hypokinesis with severe hypokinesis of the septal wall and apex of the left ventricle. Normal right ventricular size and function. TAPSE measures 21mm. Moderate-to-severe tricuspid regurgitation. No evidence of tricuspid stenosis. IVC not well visualized.   Estimated pulmonary artery systolic pressure is normal at 40 mmHg assuming a right atrial pressure of 3 mmHg.     Assessment and Plan   1) Acute on chronic systolic heart failure. EF 30%. NYHA III on admission. Volume status difficult to assess with obesity but appears compensated. BP was low earlier with symptoms of dizziness. Etiology likely non-ischemic with remote normal cath and EF low at that time. Will continue low dose coreg Have her take lisinopril at lunch time. Will consider adding aldactone OP if her BP remains stable      2) PAD. Asymptomatic. Continue with medical management and risk factor modification including. Will CtLipitor. ASA listed in allergies.    3) COPD/Nicotine Addiction. Encouraged smoking cessation and she thinks she can quit. May also have chronic NUGENT related to COPD.      4) NSVT. Better on B-blocker. Keep K>4 and Mg>2. May need ICD      5) Essential hypertension. BP was low earlier. Controlled. Goal BP <130/80. Continue medical therapy.    6) Morbid obesity. BMI 44.9. Encouraged weight loss      OK for discharge.  Will be seen in our office next week      Electronically signed by Kika Hernandez MD on 11/30/2018 at 1:35 PM

## 2018-12-01 ENCOUNTER — POST-OP TELEPHONE (OUTPATIENT)
Dept: INPATIENT UNIT | Age: 68
End: 2018-12-01

## 2018-12-03 ENCOUNTER — SCHEDULED TELEPHONE ENCOUNTER (OUTPATIENT)
Dept: PHARMACY | Age: 68
End: 2018-12-03
Payer: MEDICARE

## 2018-12-03 LAB
EKG ATRIAL RATE: 96 BPM
EKG DIAGNOSIS: NORMAL
EKG P AXIS: 42 DEGREES
EKG P-R INTERVAL: 184 MS
EKG Q-T INTERVAL: 432 MS
EKG QRS DURATION: 142 MS
EKG QTC CALCULATION (BAZETT): 545 MS
EKG R AXIS: -23 DEGREES
EKG T AXIS: 24 DEGREES
EKG VENTRICULAR RATE: 96 BPM

## 2018-12-03 NOTE — TELEPHONE ENCOUNTER
[address] [facility]  Heart Failure Service  [phone]        Follow up phone call:     Are you having any issues that need immediate attention? No, \"I am doing fine. \"     Do you have any signs or symptoms of edema? None noted              []  Shortness of breath              []  Swelling of hands, feet, ankles, or lower legs              []  Abdominal fullness              []  Cough, especially at night or when you lie down              []  Fatigue that limits activity     Do you have any other signs or symptoms to report? None noted              []  Dizziness              []  Light headedness, like you are going to pass out              []  Headache                                                                                []  Other    Wt Readings from Last 6 Encounters:   11/30/18 276 lb 14.4 oz (125.6 kg)   12/27/17 274 lb 11.1 oz (124.6 kg)   06/20/17 276 lb 3.8 oz (125.3 kg)   07/19/16 270 lb (122.5 kg)   05/21/16 270 lb (122.5 kg)   10/08/15 267 lb (121.1 kg)         Do you have a working scale? Yes     Have you been checking your weight daily? Yes              If not, the patient was encouraged to do so.     Dry weight = will assess 12/7/18   What is your weight today? 270     With regards to your weight, when would you call the doctor? [x] increased by 3 pounds or more in one day               [x] 5 pounds or more in a week              [] weight above my dry weight              [] other                 [] unknown     Has your weight increased by 3 pounds or more in one day or 5 pounds or more in a week? No          Please call the [employer] at [phone] or your Cardiologist (Lin Farmer @ [phone]) if you have a weight gain of 3 pounds or more in one day or 5 pounds or more in a week or above your dry weight.     Have you been admitted to the hospital or visited an emergency room recently?  Yes If yes, be sure to follow the medication

## 2018-12-07 ENCOUNTER — OFFICE VISIT (OUTPATIENT)
Dept: PHARMACY | Age: 68
End: 2018-12-07
Payer: MEDICARE

## 2018-12-07 VITALS
HEART RATE: 99 BPM | OXYGEN SATURATION: 98 % | SYSTOLIC BLOOD PRESSURE: 103 MMHG | DIASTOLIC BLOOD PRESSURE: 74 MMHG | WEIGHT: 276.2 LBS | BODY MASS INDEX: 44.58 KG/M2

## 2018-12-07 DIAGNOSIS — I50.23 ACUTE ON CHRONIC SYSTOLIC HEART FAILURE, NYHA CLASS 3 (HCC): Primary | ICD-10-CM

## 2018-12-07 PROCEDURE — 99214 OFFICE O/P EST MOD 30 MIN: CPT

## 2018-12-07 NOTE — PROGRESS NOTES
Shortness of Breath      vitamin D (CHOLECALCIFEROL) 1000 units TABS tablet Take 1,000 Units by mouth daily      hydrocortisone 1 % cream Apply 1 each topically 3 times daily as needed (itching/rash)         Reviewed heart failure medications including how they work and potential side effects. Get With The Guidelines  Ms. Ceja is on a Beta-Blocker for (HFrEF) (systolic) EF </= 16%  Yes    Ms. Ceja is on an Ace-Inhibitor / ARB / Entresto for (HFrEF) (systolic) EF </= 73% Yes      Ms. Ceja is on a Aldosterone Receptor Antagonist for (HFrEF) (systolic) EF </= 73% or EF </= 40% with MI (Okay to use if SCr </= 2.5mg/dL in men, SCr </= 2mg/dL in women; Potassium < 5.0meq/L) No: plan to address at a later time. We talked about the possibillity      Ms. Ceja is on a Diuretic Yes    Ms. Cjea is on a Statin Yes          Is the patient taking medications that should be avoided   with Heart Failure such as those listed below?: No: but she reports she was taking voltaren for her knee and shoulder and is concerned about being without. She did say that she did get a cream with gabapentin and voltaren and has been using this and it helps. It was also recommended that she use Tylenol arthritis. I advised this plan would be an appropriate alternative to the oral NSAID. And encouraged Tylenol arthritis as well. She does take gabapentin. NSAIDs:           Thiazolidinediones (pioglitazone):        Cilostazol (Pletal):           Saxagliptin (Onglyza) or Alogliptin (Jolan Grippe):     Aliskiren (Tekturna) which is contraindication with an ACE Inh or ARB or Entresto in patients with Diabetes    Reviewed need for labs: Yes    Addressed co-morbidities significant to Heart Failure:  1.  Hypertension        Addressed Heart Failure Team needs:   [] Advanced Directives completed and scanned  [x] Advanced Directives need to be addressed    Cardiac Rehabilitation:  [x]  Patient already enrolled or completed program in the past year  []

## 2018-12-12 ENCOUNTER — OFFICE VISIT (OUTPATIENT)
Dept: CARDIOLOGY CLINIC | Age: 68
End: 2018-12-12
Payer: MEDICARE

## 2018-12-12 ENCOUNTER — OFFICE VISIT (OUTPATIENT)
Dept: PHARMACY | Age: 68
End: 2018-12-12
Payer: MEDICARE

## 2018-12-12 VITALS
HEIGHT: 66 IN | SYSTOLIC BLOOD PRESSURE: 116 MMHG | OXYGEN SATURATION: 98 % | WEIGHT: 276 LBS | BODY MASS INDEX: 44.36 KG/M2 | HEART RATE: 78 BPM | DIASTOLIC BLOOD PRESSURE: 78 MMHG

## 2018-12-12 DIAGNOSIS — I42.9 CARDIOMYOPATHY, UNSPECIFIED TYPE (HCC): ICD-10-CM

## 2018-12-12 DIAGNOSIS — I73.9 PAD (PERIPHERAL ARTERY DISEASE) (HCC): ICD-10-CM

## 2018-12-12 DIAGNOSIS — I50.22 CHRONIC SYSTOLIC HEART FAILURE (HCC): Primary | ICD-10-CM

## 2018-12-12 DIAGNOSIS — I10 ESSENTIAL HYPERTENSION: ICD-10-CM

## 2018-12-12 DIAGNOSIS — J44.9 CHRONIC OBSTRUCTIVE PULMONARY DISEASE, UNSPECIFIED COPD TYPE (HCC): ICD-10-CM

## 2018-12-12 DIAGNOSIS — I50.23 ACUTE ON CHRONIC CLINICAL SYSTOLIC HEART FAILURE (HCC): Primary | ICD-10-CM

## 2018-12-12 PROCEDURE — 1090F PRES/ABSN URINE INCON ASSESS: CPT | Performed by: NURSE PRACTITIONER

## 2018-12-12 PROCEDURE — G8427 DOCREV CUR MEDS BY ELIG CLIN: HCPCS | Performed by: NURSE PRACTITIONER

## 2018-12-12 PROCEDURE — G8417 CALC BMI ABV UP PARAM F/U: HCPCS | Performed by: NURSE PRACTITIONER

## 2018-12-12 PROCEDURE — 1111F DSCHRG MED/CURRENT MED MERGE: CPT | Performed by: NURSE PRACTITIONER

## 2018-12-12 PROCEDURE — 4040F PNEUMOC VAC/ADMIN/RCVD: CPT | Performed by: NURSE PRACTITIONER

## 2018-12-12 PROCEDURE — 3017F COLORECTAL CA SCREEN DOC REV: CPT | Performed by: NURSE PRACTITIONER

## 2018-12-12 PROCEDURE — 99214 OFFICE O/P EST MOD 30 MIN: CPT | Performed by: NURSE PRACTITIONER

## 2018-12-12 PROCEDURE — G8400 PT W/DXA NO RESULTS DOC: HCPCS | Performed by: NURSE PRACTITIONER

## 2018-12-12 PROCEDURE — 1123F ACP DISCUSS/DSCN MKR DOCD: CPT | Performed by: NURSE PRACTITIONER

## 2018-12-12 PROCEDURE — G8926 SPIRO NO PERF OR DOC: HCPCS | Performed by: NURSE PRACTITIONER

## 2018-12-12 PROCEDURE — 1101F PT FALLS ASSESS-DOCD LE1/YR: CPT | Performed by: NURSE PRACTITIONER

## 2018-12-12 PROCEDURE — 3023F SPIROM DOC REV: CPT | Performed by: NURSE PRACTITIONER

## 2018-12-12 PROCEDURE — 4004F PT TOBACCO SCREEN RCVD TLK: CPT | Performed by: NURSE PRACTITIONER

## 2018-12-12 PROCEDURE — G8482 FLU IMMUNIZE ORDER/ADMIN: HCPCS | Performed by: NURSE PRACTITIONER

## 2018-12-12 RX ORDER — FUROSEMIDE 40 MG/1
40 TABLET ORAL DAILY
Qty: 90 TABLET | Refills: 2 | Status: SHIPPED | OUTPATIENT
Start: 2018-12-12 | End: 2019-01-15 | Stop reason: SDUPTHER

## 2018-12-12 RX ORDER — ATORVASTATIN CALCIUM 10 MG/1
10 TABLET, FILM COATED ORAL NIGHTLY
Qty: 90 TABLET | Refills: 2 | Status: SHIPPED | OUTPATIENT
Start: 2018-12-12 | End: 2019-08-10 | Stop reason: SDUPTHER

## 2018-12-12 RX ORDER — CARVEDILOL 6.25 MG/1
6.25 TABLET ORAL 2 TIMES DAILY WITH MEALS
Qty: 180 TABLET | Refills: 3 | Status: SHIPPED | OUTPATIENT
Start: 2018-12-12 | End: 2019-03-12

## 2018-12-12 RX ORDER — LISINOPRIL 5 MG/1
5 TABLET ORAL DAILY
Qty: 90 TABLET | Refills: 2 | Status: SHIPPED | OUTPATIENT
Start: 2018-12-12 | End: 2019-01-15 | Stop reason: ALTCHOICE

## 2018-12-12 NOTE — PROGRESS NOTES
TONSILLECTOMY      TUBAL LIGATION       Family History   Problem Relation Age of Onset    Hypertension Sister      Social History   Substance Use Topics    Smoking status: Current Every Day Smoker     Packs/day: 0.75     Types: Cigarettes    Smokeless tobacco: Never Used    Alcohol use No       Allergies   Allergen Reactions    Asa [Aspirin] Swelling    Pcn [Penicillins]     Shellfish Allergy Swelling    Sulfa Antibiotics      Current Outpatient Prescriptions   Medication Sig Dispense Refill    Acetaminophen (TYLENOL) 325 MG CAPS Take by mouth      lisinopril (PRINIVIL;ZESTRIL) 5 MG tablet Take 1 tablet by mouth daily 90 tablet 2    furosemide (LASIX) 40 MG tablet Take 1 tablet by mouth daily 90 tablet 2    atorvastatin (LIPITOR) 10 MG tablet Take 1 tablet by mouth nightly 90 tablet 2    carvedilol (COREG) 6.25 MG tablet Take 1 tablet by mouth 2 times daily (with meals) 180 tablet 3    ranitidine (ZANTAC) 150 MG tablet Take 150 mg by mouth 2 times daily      latanoprost (XALATAN) 0.005 % ophthalmic solution Place 1 drop into both eyes nightly      gabapentin (NEURONTIN) 800 MG tablet Take 800 mg by mouth 3 times daily. Julia Sulma albuterol-ipratropium (COMBIVENT RESPIMAT)  MCG/ACT AERS inhaler Inhale 1 puff into the lungs every 6 hours as needed for Wheezing or Shortness of Breath      vitamin D (CHOLECALCIFEROL) 1000 units TABS tablet Take 1,000 Units by mouth daily      hydrocortisone 1 % cream Apply 1 each topically 3 times daily as needed (itching/rash)       No current facility-administered medications for this visit. Physical Exam:   /78 (Site: Left Upper Arm, Position: Sitting)   Pulse 78   Ht 5' 6\" (1.676 m)   Wt 276 lb (125.2 kg)   SpO2 98%   BMI 44.55 kg/m²   Wt Readings from Last 2 Encounters:   12/12/18 276 lb (125.2 kg)   12/07/18 276 lb 3.2 oz (125.3 kg)     Constitutional: She is oriented to person, place, and time. She appears well-developed and well-nourished. In no acute distress. HEENT: Normocephalic and atraumatic. Sclerae anicteric. No xanthelasmas. EOM's intact. Neck: Neck supple. No JVD present. Carotids without bruits. No thyromegaly present. No lymphadenopathy present. Cardiovascular: RRR, normal S1 and S2; soft systolic murmur  Pulmonary/Chest: Effort normal.  Lungs clear to auscultation. Chest wall nontender  Abdominal: soft, nontender, nondistended. + bowel sounds; no hepatomegaly   Extremities: No edema or cyanosis. Pulses are 2+ radial/DP/PT bilaterally. Cap refill brisk. Neurological: No focal deficit. Skin: Skin is warm and dry. Psychiatric: She has a normal mood and affect. Her speech is normal and behavior is normal.     Lab Review:   Lab Results   Component Value Date    TRIG 81 11/27/2018    HDL 55 11/27/2018    LDLCALC 89 11/27/2018    LABVLDL 16 11/27/2018     Lab Results   Component Value Date     11/30/2018    K 4.0 11/30/2018    K 4.2 11/26/2018     11/30/2018    CO2 27 11/30/2018    BUN 25 11/30/2018    CREATININE 1.2 11/30/2018    GLUCOSE 92 11/30/2018    CALCIUM 9.1 11/30/2018      Lab Results   Component Value Date    WBC 7.0 11/26/2018    HGB 13.2 11/26/2018    HCT 40.5 11/26/2018    MCV 96.4 11/26/2018     11/26/2018     Echo: 11/28/18  Suboptimal image quality. Definity contrast administered. Left ventricular cavity size is mildly dilated. Normal left ventricular wall thickness. Overall left ventricular systolic function appears severely reduced. Ejection fraction is visually estimated to be 30%. There is moderate diffuse hypokinesis with severe hypokinesis of the septal wall and apex of the left ventricle. Normal right ventricular size and function. TAPSE measures 21mm. Moderate-to-severe tricuspid regurgitation. No evidence of tricuspid stenosis. IVC not well visualized. Estimated pulmonary artery systolic pressure is normal at 40 mmHg assuming a right atrial pressure of 3 mmHg.     Abd CT: 10/2015  Atherosclerotic calcifications are present within   the abdominal aorta and proximal iliac arteries.      Cath: 2004 ([facility] [place])  1. The left main coronary artery was angiographically normal. 2.  The left anterior descending artery is widely patent, tapers relatively quickly, but is without significant disease. 3.  The circumflex coronary artery is a dominant vessel and is angiographically normal.  4.  The right coronary artery is small, technically nondominant, and is angiographically normal.  5.  A left ventriculogram shows mild diffuse hypokinesis. LVEF is estimated at 45%. There is no mitral insufficiency. There is no gradient on pullback across the aortic valve.     Assessment:    1. Chronic systolic heart failure (HCC)  -currently compensated and NYHA class III  -continue ACE-I, increase BB and continue lasix  -low sodium diet and fluid restriction    2. Cardiomyopathy, unspecified type ([facility]  -LVEF 30%  -remote cath in past with normal coronaries and low LVEF  -continue BB and ACE-I  -no aldactone given elevated Cr    3. Essential hypertension  -better controlled  -goal BP < 130/80  -continue medical management    4. PAD (peripheral artery disease) (HCC)  -noted on CT scan in 2015  -continue statin  -allergy to ASA  -risk factor modification    5. Chronic obstructive pulmonary disease, unspecified COPD type ([facility]  -continues to smoke  -smoking cessation emphasized  -suspect some underlying chronic NUGENT    6. Morbid obesity  -BMI 44.5  -weight loss and exercise encouraged  -will refer to cardiac rehab    7. Chronic R knee pain and plan for eventual replacement  -will follow up with Dr. Kolby Olivarez in regards to cardiac clearance     Plan:  Increase Carvedilol to 6.25 mg BID  Continue lisinopril, Lasix, statin  (? Eventual change to Entresto-defer to Dr. Kolby Olivarez)  Discussed use of gabapentin: she cannot tolerate pain without gabapentin.   Emphasized and discussed low-fat/low sodium diet, monitoring of daily weights, fluid restriction, worsening signs and symptoms of heart failure and when to call, and the importance of regular exercise and activity. Refer to cardiac rehab  Emphasized and discussed strategies for smoking cessation (> 3-5 minutes of counseling given)  Check BMP and BNP in 10-14 days  Follow up with Dr. Deisi Langston in 4 weeks or sooner if needed to optimize GDMT and discuss knee surgery. Return in about 4 weeks (around 1/9/2019) for with Dr. Deisi Langston. Thanks for allowing me to participate in the care of this patient.       Howard Ricci, [facility], [address] [place] [address] [address], [address] [facility]  Office: [phone]  Fax: [phone]

## 2018-12-21 ENCOUNTER — SCHEDULED TELEPHONE ENCOUNTER (OUTPATIENT)
Dept: PHARMACY | Age: 68
End: 2018-12-21
Payer: MEDICARE

## 2019-01-10 ENCOUNTER — HOSPITAL ENCOUNTER (OUTPATIENT)
Dept: CARDIAC REHAB | Age: 69
Setting detail: THERAPIES SERIES
Discharge: HOME | End: 2019-01-10
Payer: MEDICARE

## 2019-01-10 VITALS — BODY MASS INDEX: 43.89 KG/M2 | WEIGHT: 271.9 LBS

## 2019-01-10 PROCEDURE — 93798 PHYS/QHP OP CAR RHAB W/ECG: CPT

## 2019-01-15 ENCOUNTER — OFFICE VISIT (OUTPATIENT)
Dept: CARDIOLOGY CLINIC | Age: 69
End: 2019-01-15
Payer: MEDICARE

## 2019-01-15 ENCOUNTER — APPOINTMENT (OUTPATIENT)
Dept: PHARMACY | Age: 69
End: 2019-01-15
Payer: MEDICARE

## 2019-01-15 ENCOUNTER — TELEPHONE (OUTPATIENT)
Dept: CARDIOLOGY CLINIC | Age: 69
End: 2019-01-15

## 2019-01-15 ENCOUNTER — OFFICE VISIT (OUTPATIENT)
Dept: PHARMACY | Age: 69
End: 2019-01-15
Payer: MEDICARE

## 2019-01-15 VITALS
WEIGHT: 273 LBS | BODY MASS INDEX: 43.87 KG/M2 | SYSTOLIC BLOOD PRESSURE: 124 MMHG | DIASTOLIC BLOOD PRESSURE: 86 MMHG | HEIGHT: 66 IN | OXYGEN SATURATION: 99 % | HEART RATE: 99 BPM

## 2019-01-15 VITALS — DIASTOLIC BLOOD PRESSURE: 68 MMHG | SYSTOLIC BLOOD PRESSURE: 93 MMHG | WEIGHT: 273.6 LBS | BODY MASS INDEX: 44.16 KG/M2

## 2019-01-15 DIAGNOSIS — I47.29 NSVT (NONSUSTAINED VENTRICULAR TACHYCARDIA): ICD-10-CM

## 2019-01-15 DIAGNOSIS — I50.22 CHRONIC SYSTOLIC CONGESTIVE HEART FAILURE (HCC): Primary | ICD-10-CM

## 2019-01-15 DIAGNOSIS — I42.9 CARDIOMYOPATHY, UNSPECIFIED TYPE (HCC): ICD-10-CM

## 2019-01-15 DIAGNOSIS — I50.22 CHRONIC SYSTOLIC HEART FAILURE (HCC): ICD-10-CM

## 2019-01-15 DIAGNOSIS — I73.9 PAD (PERIPHERAL ARTERY DISEASE) (HCC): ICD-10-CM

## 2019-01-15 DIAGNOSIS — I50.22 CHRONIC SYSTOLIC HEART FAILURE (HCC): Primary | ICD-10-CM

## 2019-01-15 DIAGNOSIS — E66.01 MORBID OBESITY (HCC): ICD-10-CM

## 2019-01-15 DIAGNOSIS — I10 ESSENTIAL HYPERTENSION: ICD-10-CM

## 2019-01-15 DIAGNOSIS — F17.218 CIGARETTE NICOTINE DEPENDENCE WITH OTHER NICOTINE-INDUCED DISORDER: ICD-10-CM

## 2019-01-15 PROCEDURE — G8417 CALC BMI ABV UP PARAM F/U: HCPCS | Performed by: INTERNAL MEDICINE

## 2019-01-15 PROCEDURE — G8482 FLU IMMUNIZE ORDER/ADMIN: HCPCS | Performed by: INTERNAL MEDICINE

## 2019-01-15 PROCEDURE — G8427 DOCREV CUR MEDS BY ELIG CLIN: HCPCS | Performed by: INTERNAL MEDICINE

## 2019-01-15 PROCEDURE — 1090F PRES/ABSN URINE INCON ASSESS: CPT | Performed by: INTERNAL MEDICINE

## 2019-01-15 PROCEDURE — 99213 OFFICE O/P EST LOW 20 MIN: CPT

## 2019-01-15 PROCEDURE — G8400 PT W/DXA NO RESULTS DOC: HCPCS | Performed by: INTERNAL MEDICINE

## 2019-01-15 PROCEDURE — 4040F PNEUMOC VAC/ADMIN/RCVD: CPT | Performed by: INTERNAL MEDICINE

## 2019-01-15 PROCEDURE — 99214 OFFICE O/P EST MOD 30 MIN: CPT | Performed by: INTERNAL MEDICINE

## 2019-01-15 PROCEDURE — 3017F COLORECTAL CA SCREEN DOC REV: CPT | Performed by: INTERNAL MEDICINE

## 2019-01-15 PROCEDURE — 1123F ACP DISCUSS/DSCN MKR DOCD: CPT | Performed by: INTERNAL MEDICINE

## 2019-01-15 PROCEDURE — 4004F PT TOBACCO SCREEN RCVD TLK: CPT | Performed by: INTERNAL MEDICINE

## 2019-01-15 PROCEDURE — 1101F PT FALLS ASSESS-DOCD LE1/YR: CPT | Performed by: INTERNAL MEDICINE

## 2019-01-15 RX ORDER — ANTIARTHRITIC COMBINATION NO.2 900 MG
1 TABLET ORAL DAILY
COMMUNITY
End: 2022-03-15

## 2019-01-15 RX ORDER — FUROSEMIDE 20 MG/1
20 TABLET ORAL DAILY
Qty: 90 TABLET | Refills: 3 | Status: SHIPPED | OUTPATIENT
Start: 2019-01-15 | End: 2019-02-12 | Stop reason: SDUPTHER

## 2019-01-15 RX ORDER — LANOLIN ALCOHOL/MO/W.PET/CERES
1000 CREAM (GRAM) TOPICAL DAILY
COMMUNITY
End: 2021-05-18

## 2019-01-15 RX ORDER — MULTIVITAMIN WITH IRON
250 TABLET ORAL DAILY
COMMUNITY

## 2019-01-15 RX ORDER — ACETAMINOPHEN 500 MG
500 TABLET ORAL EVERY 6 HOURS PRN
COMMUNITY
End: 2021-10-11 | Stop reason: SDUPTHER

## 2019-01-15 RX ORDER — FUROSEMIDE 20 MG/1
20 TABLET ORAL DAILY
Qty: 30 TABLET | Refills: 3 | Status: SHIPPED | OUTPATIENT
Start: 2019-01-15 | End: 2019-01-15 | Stop reason: SDUPTHER

## 2019-01-15 RX ORDER — ASCORBIC ACID 500 MG
500 TABLET ORAL DAILY
COMMUNITY
End: 2021-05-18

## 2019-01-15 RX ORDER — SENNOSIDES 8.6 MG
650 CAPSULE ORAL EVERY 8 HOURS PRN
Status: ON HOLD | COMMUNITY
End: 2019-07-31 | Stop reason: HOSPADM

## 2019-01-17 ENCOUNTER — HOSPITAL ENCOUNTER (OUTPATIENT)
Dept: CARDIAC REHAB | Age: 69
Setting detail: THERAPIES SERIES
Discharge: HOME | End: 2019-01-17
Payer: MEDICARE

## 2019-01-17 VITALS — WEIGHT: 272.5 LBS | BODY MASS INDEX: 43.98 KG/M2

## 2019-01-17 PROCEDURE — 93798 PHYS/QHP OP CAR RHAB W/ECG: CPT

## 2019-01-22 ENCOUNTER — HOSPITAL ENCOUNTER (EMERGENCY)
Age: 69
Discharge: HOME | End: 2019-01-23
Payer: MEDICARE

## 2019-01-22 ENCOUNTER — APPOINTMENT (OUTPATIENT)
Dept: CARDIAC REHAB | Age: 69
End: 2019-01-22
Payer: MEDICARE

## 2019-01-22 ENCOUNTER — APPOINTMENT (OUTPATIENT)
Dept: GENERAL RADIOLOGY | Age: 69
End: 2019-01-22
Payer: MEDICARE

## 2019-01-22 VITALS
RESPIRATION RATE: 16 BRPM | SYSTOLIC BLOOD PRESSURE: 107 MMHG | DIASTOLIC BLOOD PRESSURE: 74 MMHG | OXYGEN SATURATION: 99 % | HEART RATE: 81 BPM | BODY MASS INDEX: 43.98 KG/M2 | WEIGHT: 272.49 LBS | TEMPERATURE: 98.1 F

## 2019-01-22 DIAGNOSIS — R05.9 COUGH: Primary | ICD-10-CM

## 2019-01-22 DIAGNOSIS — J44.1 COPD EXACERBATION (HCC): ICD-10-CM

## 2019-01-22 LAB
A/G RATIO: 1 (ref 1.1–2.2)
ALBUMIN SERPL-MCNC: 3.7 G/DL (ref 3.4–5)
ALP BLD-CCNC: 65 U/L (ref 40–129)
ALT SERPL-CCNC: 12 U/L (ref 10–40)
ANION GAP SERPL CALCULATED.3IONS-SCNC: 11 MMOL/L (ref 3–16)
AST SERPL-CCNC: 14 U/L (ref 15–37)
BILIRUB SERPL-MCNC: 0.6 MG/DL (ref 0–1)
BUN BLDV-MCNC: 18 MG/DL (ref 7–20)
CALCIUM SERPL-MCNC: 9.5 MG/DL (ref 8.3–10.6)
CHLORIDE BLD-SCNC: 105 MMOL/L (ref 99–110)
CO2: 25 MMOL/L (ref 21–32)
CREAT SERPL-MCNC: 0.8 MG/DL (ref 0.6–1.2)
GFR AFRICAN AMERICAN: >60
GFR NON-AFRICAN AMERICAN: >60
GLOBULIN: 3.8 G/DL
GLUCOSE BLD-MCNC: 113 MG/DL (ref 70–99)
HCT VFR BLD CALC: 43.2 % (ref 36–48)
HEMOGLOBIN: 14.3 G/DL (ref 12–16)
MCH RBC QN AUTO: 31.5 PG (ref 26–34)
MCHC RBC AUTO-ENTMCNC: 33.1 G/DL (ref 31–36)
MCV RBC AUTO: 95 FL (ref 80–100)
PDW BLD-RTO: 14.1 % (ref 12.4–15.4)
PLATELET # BLD: 214 K/UL (ref 135–450)
PMV BLD AUTO: 8 FL (ref 5–10.5)
POTASSIUM SERPL-SCNC: 4.6 MMOL/L (ref 3.5–5.1)
PRO-BNP: 1402 PG/ML (ref 0–124)
RAPID INFLUENZA  B AGN: NEGATIVE
RAPID INFLUENZA A AGN: NEGATIVE
RBC # BLD: 4.54 M/UL (ref 4–5.2)
SODIUM BLD-SCNC: 141 MMOL/L (ref 136–145)
TOTAL PROTEIN: 7.5 G/DL (ref 6.4–8.2)
WBC # BLD: 6.9 K/UL (ref 4–11)

## 2019-01-22 PROCEDURE — 94640 AIRWAY INHALATION TREATMENT: CPT

## 2019-01-22 PROCEDURE — 93005 ELECTROCARDIOGRAM TRACING: CPT | Performed by: PHYSICIAN ASSISTANT

## 2019-01-22 PROCEDURE — 71046 X-RAY EXAM CHEST 2 VIEWS: CPT

## 2019-01-22 PROCEDURE — 94664 DEMO&/EVAL PT USE INHALER: CPT

## 2019-01-22 PROCEDURE — 99285 EMERGENCY DEPT VISIT HI MDM: CPT

## 2019-01-22 PROCEDURE — 83880 ASSAY OF NATRIURETIC PEPTIDE: CPT

## 2019-01-22 PROCEDURE — 87804 INFLUENZA ASSAY W/OPTIC: CPT

## 2019-01-22 PROCEDURE — 85027 COMPLETE CBC AUTOMATED: CPT

## 2019-01-22 PROCEDURE — 94760 N-INVAS EAR/PLS OXIMETRY 1: CPT

## 2019-01-22 PROCEDURE — 6370000000 HC RX 637 (ALT 250 FOR IP): Performed by: PHYSICIAN ASSISTANT

## 2019-01-22 PROCEDURE — 80053 COMPREHEN METABOLIC PANEL: CPT

## 2019-01-22 RX ORDER — IPRATROPIUM BROMIDE AND ALBUTEROL SULFATE 2.5; .5 MG/3ML; MG/3ML
1 SOLUTION RESPIRATORY (INHALATION) ONCE
Status: COMPLETED | OUTPATIENT
Start: 2019-01-22 | End: 2019-01-22

## 2019-01-22 RX ORDER — PREDNISONE 20 MG/1
60 TABLET ORAL ONCE
Status: COMPLETED | OUTPATIENT
Start: 2019-01-22 | End: 2019-01-22

## 2019-01-22 RX ORDER — PREDNISONE 20 MG/1
40 TABLET ORAL DAILY
Qty: 8 TABLET | Refills: 0 | Status: SHIPPED | OUTPATIENT
Start: 2019-01-22 | End: 2019-01-26

## 2019-01-22 RX ORDER — AZITHROMYCIN 250 MG/1
TABLET, FILM COATED ORAL
Qty: 1 PACKET | Refills: 0 | Status: SHIPPED | OUTPATIENT
Start: 2019-01-22 | End: 2019-04-02 | Stop reason: ALTCHOICE

## 2019-01-22 RX ADMIN — PREDNISONE 60 MG: 20 TABLET ORAL at 22:52

## 2019-01-22 RX ADMIN — IPRATROPIUM BROMIDE AND ALBUTEROL SULFATE 1 AMPULE: .5; 3 SOLUTION RESPIRATORY (INHALATION) at 23:24

## 2019-01-23 LAB
EKG ATRIAL RATE: 83 BPM
EKG DIAGNOSIS: NORMAL
EKG P AXIS: 50 DEGREES
EKG P-R INTERVAL: 186 MS
EKG Q-T INTERVAL: 436 MS
EKG QRS DURATION: 150 MS
EKG QTC CALCULATION (BAZETT): 512 MS
EKG R AXIS: -23 DEGREES
EKG T AXIS: 60 DEGREES
EKG VENTRICULAR RATE: 83 BPM

## 2019-01-23 PROCEDURE — 93010 ELECTROCARDIOGRAM REPORT: CPT | Performed by: INTERNAL MEDICINE

## 2019-01-23 ASSESSMENT — ENCOUNTER SYMPTOMS
BACK PAIN: 0
ABDOMINAL PAIN: 0
COUGH: 1
SHORTNESS OF BREATH: 0
VOMITING: 0
COLOR CHANGE: 0

## 2019-01-24 ENCOUNTER — HOSPITAL ENCOUNTER (OUTPATIENT)
Dept: CARDIAC REHAB | Age: 69
Setting detail: THERAPIES SERIES
Discharge: HOME | End: 2019-01-24
Payer: MEDICARE

## 2019-01-29 ENCOUNTER — HOSPITAL ENCOUNTER (OUTPATIENT)
Dept: CARDIAC REHAB | Age: 69
Setting detail: THERAPIES SERIES
Discharge: HOME | End: 2019-01-29
Payer: MEDICARE

## 2019-01-29 VITALS — BODY MASS INDEX: 43.6 KG/M2 | WEIGHT: 270.1 LBS

## 2019-01-29 DIAGNOSIS — I50.22 CHRONIC SYSTOLIC CONGESTIVE HEART FAILURE (HCC): ICD-10-CM

## 2019-01-29 LAB
ANION GAP SERPL CALCULATED.3IONS-SCNC: 10 MMOL/L (ref 3–16)
BUN BLDV-MCNC: 18 MG/DL (ref 7–20)
CALCIUM SERPL-MCNC: 9.4 MG/DL (ref 8.3–10.6)
CHLORIDE BLD-SCNC: 104 MMOL/L (ref 99–110)
CO2: 26 MMOL/L (ref 21–32)
CREAT SERPL-MCNC: 1 MG/DL (ref 0.6–1.2)
GFR AFRICAN AMERICAN: >60
GFR NON-AFRICAN AMERICAN: 55
GLUCOSE BLD-MCNC: 98 MG/DL (ref 70–99)
POTASSIUM SERPL-SCNC: 4.8 MMOL/L (ref 3.5–5.1)
PRO-BNP: 1503 PG/ML (ref 0–124)
SODIUM BLD-SCNC: 140 MMOL/L (ref 136–145)

## 2019-01-29 PROCEDURE — 93798 PHYS/QHP OP CAR RHAB W/ECG: CPT

## 2019-01-31 ENCOUNTER — HOSPITAL ENCOUNTER (OUTPATIENT)
Dept: CARDIAC REHAB | Age: 69
Setting detail: THERAPIES SERIES
Discharge: HOME | End: 2019-01-31
Payer: MEDICARE

## 2019-02-05 ENCOUNTER — HOSPITAL ENCOUNTER (OUTPATIENT)
Dept: CARDIAC REHAB | Age: 69
Setting detail: THERAPIES SERIES
Discharge: HOME | End: 2019-02-05
Payer: MEDICARE

## 2019-02-05 VITALS — BODY MASS INDEX: 44.55 KG/M2 | WEIGHT: 276 LBS

## 2019-02-05 PROCEDURE — 93798 PHYS/QHP OP CAR RHAB W/ECG: CPT

## 2019-02-07 ENCOUNTER — HOSPITAL ENCOUNTER (OUTPATIENT)
Dept: CARDIAC REHAB | Age: 69
Setting detail: THERAPIES SERIES
Discharge: HOME | End: 2019-02-07
Payer: MEDICARE

## 2019-02-07 VITALS — WEIGHT: 273.4 LBS | BODY MASS INDEX: 44.13 KG/M2

## 2019-02-07 PROCEDURE — 93798 PHYS/QHP OP CAR RHAB W/ECG: CPT

## 2019-02-12 ENCOUNTER — OFFICE VISIT (OUTPATIENT)
Dept: CARDIOLOGY CLINIC | Age: 69
End: 2019-02-12
Payer: MEDICARE

## 2019-02-12 ENCOUNTER — HOSPITAL ENCOUNTER (OUTPATIENT)
Dept: CARDIAC REHAB | Age: 69
Setting detail: THERAPIES SERIES
Discharge: HOME | End: 2019-02-12
Payer: MEDICARE

## 2019-02-12 VITALS
HEART RATE: 80 BPM | SYSTOLIC BLOOD PRESSURE: 108 MMHG | DIASTOLIC BLOOD PRESSURE: 56 MMHG | HEIGHT: 66 IN | WEIGHT: 271 LBS | BODY MASS INDEX: 43.55 KG/M2 | OXYGEN SATURATION: 98 %

## 2019-02-12 VITALS — BODY MASS INDEX: 43.9 KG/M2 | WEIGHT: 272 LBS

## 2019-02-12 DIAGNOSIS — E66.01 MORBID OBESITY (HCC): ICD-10-CM

## 2019-02-12 DIAGNOSIS — I50.22 CHRONIC SYSTOLIC CONGESTIVE HEART FAILURE (HCC): Primary | ICD-10-CM

## 2019-02-12 DIAGNOSIS — I73.9 PAD (PERIPHERAL ARTERY DISEASE) (HCC): ICD-10-CM

## 2019-02-12 DIAGNOSIS — I10 ESSENTIAL HYPERTENSION: ICD-10-CM

## 2019-02-12 DIAGNOSIS — I47.29 NSVT (NONSUSTAINED VENTRICULAR TACHYCARDIA): ICD-10-CM

## 2019-02-12 DIAGNOSIS — F17.218 CIGARETTE NICOTINE DEPENDENCE WITH OTHER NICOTINE-INDUCED DISORDER: ICD-10-CM

## 2019-02-12 PROCEDURE — 4004F PT TOBACCO SCREEN RCVD TLK: CPT | Performed by: INTERNAL MEDICINE

## 2019-02-12 PROCEDURE — 1101F PT FALLS ASSESS-DOCD LE1/YR: CPT | Performed by: INTERNAL MEDICINE

## 2019-02-12 PROCEDURE — G8400 PT W/DXA NO RESULTS DOC: HCPCS | Performed by: INTERNAL MEDICINE

## 2019-02-12 PROCEDURE — 1123F ACP DISCUSS/DSCN MKR DOCD: CPT | Performed by: INTERNAL MEDICINE

## 2019-02-12 PROCEDURE — G8482 FLU IMMUNIZE ORDER/ADMIN: HCPCS | Performed by: INTERNAL MEDICINE

## 2019-02-12 PROCEDURE — G8427 DOCREV CUR MEDS BY ELIG CLIN: HCPCS | Performed by: INTERNAL MEDICINE

## 2019-02-12 PROCEDURE — 99214 OFFICE O/P EST MOD 30 MIN: CPT | Performed by: INTERNAL MEDICINE

## 2019-02-12 PROCEDURE — 1090F PRES/ABSN URINE INCON ASSESS: CPT | Performed by: INTERNAL MEDICINE

## 2019-02-12 PROCEDURE — G8417 CALC BMI ABV UP PARAM F/U: HCPCS | Performed by: INTERNAL MEDICINE

## 2019-02-12 PROCEDURE — 93798 PHYS/QHP OP CAR RHAB W/ECG: CPT

## 2019-02-12 PROCEDURE — 4040F PNEUMOC VAC/ADMIN/RCVD: CPT | Performed by: INTERNAL MEDICINE

## 2019-02-12 PROCEDURE — 3017F COLORECTAL CA SCREEN DOC REV: CPT | Performed by: INTERNAL MEDICINE

## 2019-02-12 RX ORDER — FUROSEMIDE 20 MG/1
20 TABLET ORAL PRN
Qty: 90 TABLET | Refills: 3 | Status: ON HOLD
Start: 2019-02-12 | End: 2019-09-10 | Stop reason: SDUPTHER

## 2019-02-14 ENCOUNTER — HOSPITAL ENCOUNTER (OUTPATIENT)
Dept: CARDIAC REHAB | Age: 69
Setting detail: THERAPIES SERIES
Discharge: HOME | End: 2019-02-14
Payer: MEDICARE

## 2019-02-14 VITALS — BODY MASS INDEX: 43.58 KG/M2 | WEIGHT: 270 LBS

## 2019-02-14 PROCEDURE — 93798 PHYS/QHP OP CAR RHAB W/ECG: CPT

## 2019-02-19 ENCOUNTER — HOSPITAL ENCOUNTER (OUTPATIENT)
Dept: CARDIAC REHAB | Age: 69
Setting detail: THERAPIES SERIES
Discharge: HOME | End: 2019-02-19
Payer: MEDICARE

## 2019-02-19 ENCOUNTER — TELEPHONE (OUTPATIENT)
Dept: CARDIOLOGY CLINIC | Age: 69
End: 2019-02-19

## 2019-02-19 VITALS — WEIGHT: 275 LBS | BODY MASS INDEX: 44.39 KG/M2

## 2019-02-19 PROCEDURE — 93798 PHYS/QHP OP CAR RHAB W/ECG: CPT

## 2019-02-21 ENCOUNTER — HOSPITAL ENCOUNTER (OUTPATIENT)
Dept: CARDIAC REHAB | Age: 69
Setting detail: THERAPIES SERIES
Discharge: HOME | End: 2019-02-21
Payer: MEDICARE

## 2019-02-21 VITALS — BODY MASS INDEX: 44.22 KG/M2 | WEIGHT: 274 LBS

## 2019-02-21 PROCEDURE — 93798 PHYS/QHP OP CAR RHAB W/ECG: CPT

## 2019-02-26 ENCOUNTER — HOSPITAL ENCOUNTER (OUTPATIENT)
Dept: CARDIAC REHAB | Age: 69
Setting detail: THERAPIES SERIES
Discharge: HOME | End: 2019-02-26
Payer: MEDICARE

## 2019-02-26 VITALS — WEIGHT: 274.3 LBS | BODY MASS INDEX: 44.27 KG/M2

## 2019-02-26 PROCEDURE — 93798 PHYS/QHP OP CAR RHAB W/ECG: CPT

## 2019-02-28 ENCOUNTER — HOSPITAL ENCOUNTER (OUTPATIENT)
Dept: CARDIAC REHAB | Age: 69
Setting detail: THERAPIES SERIES
Discharge: HOME | End: 2019-02-28
Payer: MEDICARE

## 2019-02-28 VITALS — WEIGHT: 276 LBS | BODY MASS INDEX: 44.55 KG/M2

## 2019-02-28 PROCEDURE — 93798 PHYS/QHP OP CAR RHAB W/ECG: CPT

## 2019-03-05 ENCOUNTER — HOSPITAL ENCOUNTER (OUTPATIENT)
Dept: CARDIAC REHAB | Age: 69
Setting detail: THERAPIES SERIES
Discharge: HOME | End: 2019-03-05
Payer: MEDICARE

## 2019-03-05 VITALS — WEIGHT: 278 LBS | BODY MASS INDEX: 44.87 KG/M2

## 2019-03-05 PROCEDURE — 93798 PHYS/QHP OP CAR RHAB W/ECG: CPT

## 2019-03-07 ENCOUNTER — HOSPITAL ENCOUNTER (OUTPATIENT)
Dept: CARDIAC REHAB | Age: 69
Setting detail: THERAPIES SERIES
Discharge: HOME | End: 2019-03-07
Payer: MEDICARE

## 2019-03-07 VITALS — BODY MASS INDEX: 44.87 KG/M2 | WEIGHT: 278 LBS

## 2019-03-07 PROCEDURE — 93798 PHYS/QHP OP CAR RHAB W/ECG: CPT

## 2019-03-12 ENCOUNTER — OFFICE VISIT (OUTPATIENT)
Dept: CARDIOLOGY CLINIC | Age: 69
End: 2019-03-12
Payer: MEDICARE

## 2019-03-12 ENCOUNTER — HOSPITAL ENCOUNTER (OUTPATIENT)
Dept: CARDIAC REHAB | Age: 69
Setting detail: THERAPIES SERIES
Discharge: HOME | End: 2019-03-12
Payer: MEDICARE

## 2019-03-12 VITALS
SYSTOLIC BLOOD PRESSURE: 104 MMHG | WEIGHT: 275 LBS | DIASTOLIC BLOOD PRESSURE: 64 MMHG | OXYGEN SATURATION: 98 % | HEIGHT: 66 IN | HEART RATE: 70 BPM | BODY MASS INDEX: 44.2 KG/M2

## 2019-03-12 VITALS — WEIGHT: 275.4 LBS | BODY MASS INDEX: 44.45 KG/M2

## 2019-03-12 DIAGNOSIS — E66.01 MORBID OBESITY (HCC): ICD-10-CM

## 2019-03-12 DIAGNOSIS — I10 ESSENTIAL HYPERTENSION: ICD-10-CM

## 2019-03-12 DIAGNOSIS — I73.9 PAD (PERIPHERAL ARTERY DISEASE) (HCC): ICD-10-CM

## 2019-03-12 DIAGNOSIS — I47.29 NSVT (NONSUSTAINED VENTRICULAR TACHYCARDIA): ICD-10-CM

## 2019-03-12 DIAGNOSIS — I50.22 CHRONIC SYSTOLIC HEART FAILURE (HCC): Primary | ICD-10-CM

## 2019-03-12 PROCEDURE — G8482 FLU IMMUNIZE ORDER/ADMIN: HCPCS | Performed by: INTERNAL MEDICINE

## 2019-03-12 PROCEDURE — G8400 PT W/DXA NO RESULTS DOC: HCPCS | Performed by: INTERNAL MEDICINE

## 2019-03-12 PROCEDURE — 93798 PHYS/QHP OP CAR RHAB W/ECG: CPT

## 2019-03-12 PROCEDURE — 4040F PNEUMOC VAC/ADMIN/RCVD: CPT | Performed by: INTERNAL MEDICINE

## 2019-03-12 PROCEDURE — 1090F PRES/ABSN URINE INCON ASSESS: CPT | Performed by: INTERNAL MEDICINE

## 2019-03-12 PROCEDURE — 1036F TOBACCO NON-USER: CPT | Performed by: INTERNAL MEDICINE

## 2019-03-12 PROCEDURE — G8427 DOCREV CUR MEDS BY ELIG CLIN: HCPCS | Performed by: INTERNAL MEDICINE

## 2019-03-12 PROCEDURE — 99214 OFFICE O/P EST MOD 30 MIN: CPT | Performed by: INTERNAL MEDICINE

## 2019-03-12 PROCEDURE — 1101F PT FALLS ASSESS-DOCD LE1/YR: CPT | Performed by: INTERNAL MEDICINE

## 2019-03-12 PROCEDURE — G8417 CALC BMI ABV UP PARAM F/U: HCPCS | Performed by: INTERNAL MEDICINE

## 2019-03-12 PROCEDURE — 1123F ACP DISCUSS/DSCN MKR DOCD: CPT | Performed by: INTERNAL MEDICINE

## 2019-03-12 PROCEDURE — 3017F COLORECTAL CA SCREEN DOC REV: CPT | Performed by: INTERNAL MEDICINE

## 2019-03-12 RX ORDER — CARVEDILOL 12.5 MG/1
12.5 TABLET ORAL 2 TIMES DAILY WITH MEALS
Qty: 60 TABLET | Refills: 5 | Status: SHIPPED | OUTPATIENT
Start: 2019-03-12 | End: 2019-03-12 | Stop reason: SDUPTHER

## 2019-03-12 RX ORDER — CARVEDILOL 12.5 MG/1
12.5 TABLET ORAL 2 TIMES DAILY
Qty: 180 TABLET | Refills: 5 | Status: SHIPPED | OUTPATIENT
Start: 2019-03-12 | End: 2019-05-21

## 2019-03-14 ENCOUNTER — APPOINTMENT (OUTPATIENT)
Dept: PHARMACY | Age: 69
End: 2019-03-14
Payer: MEDICARE

## 2019-03-14 ENCOUNTER — HOSPITAL ENCOUNTER (OUTPATIENT)
Dept: CARDIAC REHAB | Age: 69
Setting detail: THERAPIES SERIES
Discharge: HOME | End: 2019-03-14
Payer: MEDICARE

## 2019-03-14 VITALS — WEIGHT: 275 LBS | BODY MASS INDEX: 44.39 KG/M2

## 2019-03-14 PROCEDURE — 93798 PHYS/QHP OP CAR RHAB W/ECG: CPT

## 2019-03-19 ENCOUNTER — HOSPITAL ENCOUNTER (OUTPATIENT)
Dept: CARDIAC REHAB | Age: 69
Setting detail: THERAPIES SERIES
Discharge: HOME | End: 2019-03-19
Payer: MEDICARE

## 2019-03-19 VITALS — WEIGHT: 272 LBS | BODY MASS INDEX: 43.9 KG/M2

## 2019-03-19 PROCEDURE — 93798 PHYS/QHP OP CAR RHAB W/ECG: CPT

## 2019-03-21 ENCOUNTER — APPOINTMENT (OUTPATIENT)
Dept: CARDIAC REHAB | Age: 69
End: 2019-03-21
Payer: MEDICARE

## 2019-03-26 ENCOUNTER — HOSPITAL ENCOUNTER (OUTPATIENT)
Dept: CARDIAC REHAB | Age: 69
Setting detail: THERAPIES SERIES
Discharge: HOME | End: 2019-03-26
Payer: MEDICARE

## 2019-03-26 VITALS — WEIGHT: 269 LBS | BODY MASS INDEX: 43.42 KG/M2

## 2019-03-26 PROCEDURE — 93798 PHYS/QHP OP CAR RHAB W/ECG: CPT

## 2019-03-28 ENCOUNTER — HOSPITAL ENCOUNTER (OUTPATIENT)
Dept: CARDIAC REHAB | Age: 69
Setting detail: THERAPIES SERIES
Discharge: HOME | End: 2019-03-28
Payer: MEDICARE

## 2019-03-28 PROCEDURE — 93798 PHYS/QHP OP CAR RHAB W/ECG: CPT

## 2019-04-02 ENCOUNTER — HOSPITAL ENCOUNTER (OUTPATIENT)
Dept: CARDIAC REHAB | Age: 69
Setting detail: THERAPIES SERIES
Discharge: HOME | End: 2019-04-02
Payer: MEDICARE

## 2019-04-02 ENCOUNTER — OFFICE VISIT (OUTPATIENT)
Dept: PHARMACY | Age: 69
End: 2019-04-02
Payer: MEDICARE

## 2019-04-02 VITALS
SYSTOLIC BLOOD PRESSURE: 91 MMHG | DIASTOLIC BLOOD PRESSURE: 63 MMHG | WEIGHT: 274.6 LBS | BODY MASS INDEX: 44.32 KG/M2 | HEART RATE: 62 BPM

## 2019-04-02 VITALS — WEIGHT: 268.8 LBS | BODY MASS INDEX: 43.39 KG/M2

## 2019-04-02 DIAGNOSIS — I50.22 CHRONIC SYSTOLIC HEART FAILURE (HCC): Primary | ICD-10-CM

## 2019-04-02 PROCEDURE — 99213 OFFICE O/P EST LOW 20 MIN: CPT

## 2019-04-02 PROCEDURE — 93798 PHYS/QHP OP CAR RHAB W/ECG: CPT

## 2019-04-02 NOTE — PATIENT INSTRUCTIONS
HEART FAILURE:    With heart failure you must follow up with your Cardiologist, your PCP and other physicians as appropriate. Especially 7 days after a hospitalization and then as directed. Please call right away with any signs or symptoms of heart failure or other medical conditions that need attention or with any questions at all. Please call your Cardiologist or your PCP or the  [address] at [phone]. We can help manage these symptoms and often prevent a visit to the Emergency Room or hospitalization. * Know your dry weight (your weight without any fluid on board)    * Call any time you have questions about anything. Do not wait. * It is very important to take your medications as prescribed, do not miss any doses. Call for refills before you run out. Typically when you have one week left. If you have trouble getting your medications for any reason, call us at [phone]. * Avoid NSAIDs (non steroidal anti inflammatory drugs) like Aleve (naproxen), Advil and Motrin (ibuprofen), Mobic (diclofenac), Celebrex (celecoxib) and aspirin. A daily aspirin is okay if recommended by your physician. It is okay to take Tylenol (acetaminophen) unless your physician has told you    otherwise. * Limit fluid intake. Typically this is no more than 1,500-2,000 milliliters (mL) per day. This is a liter and a half to two liters. This is equal to approximately 48-64 ounces (oz) per day    * Limit sodium intake. Typically this is no more than 2,000-2,400 milligrams (mg) per day. You will need to add up the sodium content found on the nutrition label for the                  foods you eat each day. * Weigh yourself every day. Weigh yourself before breakfast and after you go to the          restroom. Wear the same thing each time you weigh yourself. Keep a log and bring it to each visit. Call if you gain more than 3 pounds in one day. [phone]   Call if you gain more than 5 pounds in one week. [phone]   Call if your weight goes up above your dry weight. [phone]    * If you have hypertension (high blood pressure), you may want to check your blood pressure daily. Keep your blood pressure at your goal or below. Keep a log and bring it to each visit. * Be sure to keep good control of your Diabetes     * Be sure to keep good control of your Cholesterol    * Eat a Heart healthy diet    * Be active. Follow an exercise plan that is reasonable for you. * Great job on quitting smoking!

## 2019-04-02 NOTE — PROGRESS NOTES
[facility]  Pharmacist  Heart Failure    [address], [address]  Phone: [phone]  Fax: [phone]      NAME: Raylene Aschoff  MEDICAL RECORD NUMBER:  [id number]  AGE: 70 y.o. GENDER: female  : 1950  EPISODE DATE:  2019      [address] referral by Dr. Anders Shown    Dry weight: 271 pounds on her scale (276 on our scale)     ECHO EF%: 30 Date: 18       Subjective   Ms. Ceja is a 70 y.o. female here for the Heart Failure Services. They are here today for a comprehensive medication review including over the counter medications and herbal products, overall wellbeing assessment, transition of care and any needed adjustments with updates and recommendations communicated to the referring physician. Functional Activity New York Heart Association Classification  Patient Symptoms   []   Class I No limitation of physical activity. Ordinary physical activity does not cause undue fatigue, palpitation, dyspnea (shortness of breath). [x]   Class II Slight limitation of physical activity. Comfortable at rest. Ordinary physical activity results in fatigue, palpitation, dyspnea (shortness of breath). []   Class III  [place] limitation of physical activity. Comfortable at rest.  Less than ordinary activity causes fatigue, palpitation, dyspnea. []   Class IV Unable to carry on any physical activity without discomfort. Symptoms of heart failure at rest.  If any physical activity is undertaken, discomfort increases.     Shortness of Breath:   []   None   [x]   Dyspnea on exertion   []   Dyspnea with normal activities  []   Dyspnea at rest  []   Dyspnea while sleeping    Patient Findings:   []  Missed doses  []  Diet changes  []  Sodium intake changes    []  Alcohol intake changes  []  Night time cough  []  Edema    [x]  Activity changes   []  Fatigue that limits activity []  Acute illness  []  Early saiety, abd fullness []  Chest pain   [x]  Other    Comments:  Participating in cardiac rehab, her knee is still painful. She quit smoking 19.     Objective     Patient Active Problem List   Diagnosis Code    Acute on chronic systolic heart failure, NYHA class 3 ([facility]) I50.23    PAD (peripheral artery disease) ([facility] I73.9    NSVT (nonsustained ventricular tachycardia) ([facility]) I47.2    Nicotine dependence F17.200    Morbid obesity with BMI of 40.0-44.9, adult ([facility] E66.01, Z68.41    Essential hypertension I10     Social History     Tobacco Use    Smoking status: Former Smoker     Packs/day: 0.75     Types: Cigarettes     Last attempt to quit: 2019     Years since quittin.1    Smokeless tobacco: Never Used   Substance Use Topics    Alcohol use: No    Drug use: No         BMP:   Lab Results   Component Value Date     2019    K 4.8 2019    K 4.2 2018     2019    CO2 26 2019    BUN 18 2019    CREATININE 1.0 2019     CBC:    Lab Results   Component Value Date    WBC 6.9 2019    HGB 14.3 2019    HCT 43.2 2019     2019     HgA1C: No results found for: LABA1C  TSH: (ref range 0.27 - 4.20 uIU/mL)  No results found for: TSH  Lipids:   Lab Results   Component Value Date    CHOL 160 2018    TRIG 81 2018    HDL 55 2018    LDLCALC 89 2018     ProBNP:   Lab Results   Component Value Date    PROBNP 1,503 2019    PROBNP 1,402 2019    PROBNP 1,578 2018       Assessment    BP 91/63   Pulse 62   Wt 274 lb 9.6 oz (124.6 kg)   BMI 44.32 kg/m²     BP Readings from Last 3 Encounters:   19 91/63   19 104/64   19 (!) 108/56       Wt Readings from Last 6 Encounters:   19 274 lb 9.6 oz (124.6 kg)   19 268 lb 12.8 oz (121.9 kg)   19 269 lb (122 kg)   19 272 lb (123.4 kg)   19 275 lb (124.7 kg)   19 275 lb 6.4 oz (124.9 kg)       Addressed weight gain / loss: stable    Reviewed daily weight log and assessed self management skills: 265-269. No increase of 3lbs in a day or 5 lbs in a week. Talked about keep track of days that she takes an extra diuretic. Talked about documenting how she feels, missed doses or eating foods she should not have. Call with symptoms. She expressed understanding. Encouraged daily weights and to call with increase of 3 pounds in one day or 5 pounds in one week or weight increased above dry weight      Discussed fluid restriction:  Yes   Discussed sodium restriction: Yes    Encouraged smoking cessation:  Yes, I praised her for this. Advised there is assistance if she has difficulty. She expresses understanding. She quit on her mother's birthday who is no longer with her. I advised her mother would be proud. Encouraged alcohol abstinence:  No: N/A    Medications reviewed by the Pharmacist: Yes   [] compared patient's bottles with EPIC list  [x] patient did not bring medication bottles    Total Discrepancies: 5  Heart Failure Medication Discrepancies: 0      Medication Reconciliation Discrepancies:  Not taking Cranberry, Vit A, Biotin, Magnesium and Vit C. She plans to restarted. She just got tired of taking so many pills. Medication Reconciliation comments:   I did verify that her physician did not recommend the magnesium. This was something she took on her own. She know the Tylenol limit. She alternates between regular tylenol and arthritis tylenol.      She has Entresto samples    Current medications:  Outpatient Medications Marked as Taking for the 4/2/19 encounter (Office Visit) with Demetrius Bell RPH   Medication Sig Dispense Refill    sacubitril-valsartan (ENTRESTO) 49-51 MG per tablet Take 1 tablet by mouth 2 times daily 60 tablet 5    carvedilol (COREG) 12.5 MG tablet Take 1 tablet by mouth 2 times daily 180 tablet 5    furosemide (LASIX) 20 MG tablet Take 1 tablet by mouth as needed (take as needed for weight gain) 90 tablet 3    acetaminophen (TYLENOL) 500 MG tablet Take 500 mg by mouth every 6 hours as needed for Pain Max 4,000mg total acetaminophen per 24 hours.  acetaminophen (TYLENOL) 650 MG extended release tablet Take 650 mg by mouth every 8 hours as needed for Pain Max 4,000mg total acetaminophen per 24 hours.  vitamin D (CHOLECALCIFEROL) 5000 units CAPS capsule Take 5,000 Units by mouth daily      vitamin B-12 (CYANOCOBALAMIN) 1000 MCG tablet Take 1,000 mcg by mouth daily      Multiple Vitamins-Minerals (CENTRUM SILVER PO) Take 1 tablet by mouth daily      atorvastatin (LIPITOR) 10 MG tablet Take 1 tablet by mouth nightly 90 tablet 2    ranitidine (ZANTAC) 150 MG tablet Take 150 mg by mouth 2 times daily      latanoprost (XALATAN) 0.005 % ophthalmic solution Place 1 drop into both eyes nightly      gabapentin (NEURONTIN) 800 MG tablet Take 800 mg by mouth 3 times daily. Young Dennis albuterol-ipratropium (COMBIVENT RESPIMAT)  MCG/ACT AERS inhaler Inhale 1 puff into the lungs every 6 hours as needed for Wheezing or Shortness of Breath      hydrocortisone 1 % cream Apply 1 each topically 3 times daily as needed (itching/rash)         Reviewed heart failure medications including how they work and potential side effects. Get With The Guidelines  Ms. Ceja is on a Beta-Blocker for (HFrEF) (systolic) EF </= 46%  Yes    Ms. Ceja is on an Ace-Inhibitor / ARB / Entresto for (HFrEF) (systolic) EF </= 10% Yes      Ms. Ceja is on a Aldosterone Receptor Antagonist for (HFrEF) (systolic) EF </= 73% or EF </= 40% with MI (Okay to use if SCr </= 2.5mg/dL in men, SCr </= 2mg/dL in women; Potassium < 5.0meq/L) No: Dr. Nell Kussmaul considering of her BP remains stable. Ms. Shady Lin is on a Diuretic Yes    Ms. Ceja is on a Statin Yes          Is the patient taking medications that should be avoided   with Heart Failure such as those listed below?: No    NSAIDs:           Thiazolidinediones (pioglitazone): Cilostazol (Pletal):           Saxagliptin (Onglyza) or Alogliptin (Yun Cotton):     Aliskiren (Tekturna) which is contraindication with an ACE Inh or ARB or Entresto in patients with Diabetes    Addressed co-morbidities significant to Heart Failure:  1. COPD - admission in January noted to be COPD  Seems to be new. We talked about her inhaler and how stopping smoking will have a great impact. Cardiac Rehabilitation:  [x]  Patient already enrolled or completed program in the past year    Additional patient assessment:   Ms. Chay Chandra comes with her sister who also has heart failure. Ms. Chay Chandra is doing well and doing everything she should be doing. She reports today that she was being evaluated for knee surgery when all of these medical issues started showing up, so the knee surgery has been put on hold. I encouraged her to stick with rehab and remain smoke free and hopefully she will be able to have the surgery. Plan     Daily weights, sodium and fluid restrictions. Report any unusual signs or symptoms including those of heart failure. Report weight gain of 3 pounds in one day or 5 pounds in one week. Hypertension goal <130/80, unless otherwise instructed. Smoking Cessation  Alcohol abstinence    Action taken:  No changes today. Recommendations to the physician:  none    Patient Instructions:  General heart failure instructions and praised for remaining smoke free.      [address] [facility] Heart Failure Service Follow-up scheduled: Yes     Follow-up visit will include:   [] Nurse visit, patient assessment  [] Heart Failure Diet Plan with Dietitian  [] BMP  [] BNP  [] Other Labs  [x] Labs as needed, to be determined during the visit  [] Physical activity plan  [] Spiritual Care, Advanced Directives  [] Comprehensive Medication Review with the Pharmacist  [x] Medication Review  [] Review Patient's Log Booklet    [x] Self Management Skills including daily weights and sodium and fluid restrictions  [x] Smoking Cessation, f/u and praise.    [] Compliance  [] Other          Electronically signed by Katalina Jimenez [facility], PharmD on 4/2/2019 at 6:30 PM

## 2019-04-04 ENCOUNTER — HOSPITAL ENCOUNTER (OUTPATIENT)
Dept: CARDIAC REHAB | Age: 69
Setting detail: THERAPIES SERIES
Discharge: HOME | End: 2019-04-04
Payer: MEDICARE

## 2019-04-04 VITALS — BODY MASS INDEX: 43.97 KG/M2 | WEIGHT: 272.4 LBS

## 2019-04-04 PROCEDURE — 93798 PHYS/QHP OP CAR RHAB W/ECG: CPT

## 2019-04-11 ENCOUNTER — HOSPITAL ENCOUNTER (OUTPATIENT)
Dept: CARDIAC REHAB | Age: 69
Setting detail: THERAPIES SERIES
Discharge: HOME | End: 2019-04-11
Payer: MEDICARE

## 2019-04-11 VITALS — WEIGHT: 272.2 LBS | BODY MASS INDEX: 43.93 KG/M2

## 2019-04-11 PROCEDURE — 93798 PHYS/QHP OP CAR RHAB W/ECG: CPT

## 2019-04-15 NOTE — PROGRESS NOTES
heard.  Pulmonary/Chest: Effort normal and breath sounds normal. No respiratory distress. She has no wheezes, rhonchi or rales. Abdominal: Soft, non-tender. Bowel sounds are normal. She exhibits no organomegaly, mass or bruit. Extremities: No edema, cyanosis, or clubbing. Pulses are 2+ radial/carotid bilaterally. Neurological: No gross cranial nerve deficit. Coordination normal.   Skin: Skin is warm and dry. There is no rash or diaphoresis. Psychiatric: She has a normal mood and affect. Her speech is normal and behavior is normal.     Lab Review:   FLP:    Lab Results   Component Value Date    TRIG 81 11/27/2018    HDL 55 11/27/2018    LDLCALC 89 11/27/2018    LABVLDL 16 11/27/2018     BUN/Creatinine:    Lab Results   Component Value Date    BUN 18 01/29/2019    CREATININE 1.0 01/29/2019       EKG Interpretation: 11/26/18 Reviewed, Sinus rhythm with premature supraventricular complexes. Left bundle branch block    Imaging:  Cath: 2004 ([facility])  The left main coronary artery was angiographically normal.   The left anterior descending artery is widely patent, tapers relatively quickly, but is without significant disease. The circumflex coronary artery is a dominant vessel and is angiographically normal.  The right coronary artery is small, technically nondominant, and is angiographically normal.  A left ventriculogram shows mild diffuse hypokinesis. LVEF is estimated at 45%. There is no mitral insufficiency. There is no gradient on pullback across the aortic valve.     Abd CT: 10/2015  Atherosclerotic calcifications are present within   the abdominal aorta and proximal iliac arteries.      PFT: 10/2018 ([address])  The patient has mild obstructive lung disease with no bronchodilator response seen. Mild restrictive defect. Clinical and radiographic correlation is needed, although this can be explained on the basis of obesity since ERV is reduced to 16%.   Minimal reduction in Please do not hesitate to contact me if you have any questions. Sincerely,  Blade Ferrer. Manoj Soto, [facility], [address] [place], [place] [address] [place] [address]  Ph: [phone]  Fax: [phone]    This note was scribed in the presence of Dr Manoj Soto MD by Murlean Sacks, RN. Physician Attestation: The scribes documentation has been prepared under my direction and personally reviewed by me in its entirety. I confirm that the note above accurately reflects all work, treatment, procedures, and medical decision making performed by me.

## 2019-04-16 ENCOUNTER — OFFICE VISIT (OUTPATIENT)
Dept: CARDIOLOGY CLINIC | Age: 69
End: 2019-04-16
Payer: MEDICARE

## 2019-04-16 ENCOUNTER — HOSPITAL ENCOUNTER (OUTPATIENT)
Dept: CARDIAC REHAB | Age: 69
Setting detail: THERAPIES SERIES
Discharge: HOME | End: 2019-04-16
Payer: MEDICARE

## 2019-04-16 VITALS — BODY MASS INDEX: 43.66 KG/M2 | WEIGHT: 270.5 LBS

## 2019-04-16 VITALS
WEIGHT: 271 LBS | HEART RATE: 70 BPM | OXYGEN SATURATION: 98 % | BODY MASS INDEX: 43.55 KG/M2 | DIASTOLIC BLOOD PRESSURE: 72 MMHG | HEIGHT: 66 IN | SYSTOLIC BLOOD PRESSURE: 128 MMHG

## 2019-04-16 DIAGNOSIS — E66.01 MORBID OBESITY (HCC): ICD-10-CM

## 2019-04-16 DIAGNOSIS — I10 ESSENTIAL HYPERTENSION: ICD-10-CM

## 2019-04-16 DIAGNOSIS — I50.22 CHRONIC SYSTOLIC HEART FAILURE (HCC): Primary | ICD-10-CM

## 2019-04-16 DIAGNOSIS — J44.9 CHRONIC OBSTRUCTIVE PULMONARY DISEASE, UNSPECIFIED COPD TYPE (HCC): ICD-10-CM

## 2019-04-16 DIAGNOSIS — I73.9 PAD (PERIPHERAL ARTERY DISEASE) (HCC): ICD-10-CM

## 2019-04-16 PROCEDURE — G8417 CALC BMI ABV UP PARAM F/U: HCPCS | Performed by: INTERNAL MEDICINE

## 2019-04-16 PROCEDURE — G8400 PT W/DXA NO RESULTS DOC: HCPCS | Performed by: INTERNAL MEDICINE

## 2019-04-16 PROCEDURE — G8926 SPIRO NO PERF OR DOC: HCPCS | Performed by: INTERNAL MEDICINE

## 2019-04-16 PROCEDURE — 93798 PHYS/QHP OP CAR RHAB W/ECG: CPT

## 2019-04-16 PROCEDURE — G8427 DOCREV CUR MEDS BY ELIG CLIN: HCPCS | Performed by: INTERNAL MEDICINE

## 2019-04-16 PROCEDURE — 99214 OFFICE O/P EST MOD 30 MIN: CPT | Performed by: INTERNAL MEDICINE

## 2019-04-16 PROCEDURE — 1090F PRES/ABSN URINE INCON ASSESS: CPT | Performed by: INTERNAL MEDICINE

## 2019-04-16 PROCEDURE — 1123F ACP DISCUSS/DSCN MKR DOCD: CPT | Performed by: INTERNAL MEDICINE

## 2019-04-16 PROCEDURE — 4040F PNEUMOC VAC/ADMIN/RCVD: CPT | Performed by: INTERNAL MEDICINE

## 2019-04-16 PROCEDURE — 3023F SPIROM DOC REV: CPT | Performed by: INTERNAL MEDICINE

## 2019-04-16 PROCEDURE — 3017F COLORECTAL CA SCREEN DOC REV: CPT | Performed by: INTERNAL MEDICINE

## 2019-04-16 PROCEDURE — 1036F TOBACCO NON-USER: CPT | Performed by: INTERNAL MEDICINE

## 2019-04-16 NOTE — PATIENT INSTRUCTIONS
sodium\" may still have too much sodium. Be sure to read the label to see how much sodium you are getting. · Buy fresh vegetables, or frozen vegetables without added sauces. Buy low-sodium versions of canned vegetables, soups, and other canned goods. Prepare low-sodium meals  · Cut back on the amount of salt you use in cooking. This will help you adjust to the taste. Do not add salt after cooking. One teaspoon of salt has about 2,300 mg of sodium. · Take the salt shaker off the table. · Flavor your food with garlic, lemon juice, onion, vinegar, herbs, and spices. Do not use soy sauce, lite soy sauce, steak sauce, onion salt, garlic salt, celery salt, mustard, or ketchup on your food. · Use low-sodium salad dressings, sauces, and ketchup. Or make your own salad dressings and sauces without adding salt. · Use less salt (or none) when recipes call for it. You can often use half the salt a recipe calls for without losing flavor. Other foods such as rice, pasta, and grains do not need added salt. · Rinse canned vegetables, and cook them in fresh water. This removes some--but not all--of the salt. · Avoid water that is naturally high in sodium or that has been treated with water softeners, which add sodium. Call your local water company to find out the sodium content of your water supply. If you buy bottled water, read the label and choose a sodium-free brand. Avoid high-sodium foods  · Avoid eating:  ? Smoked, cured, salted, and canned meat, fish, and poultry. ? Ham, almodovar, hot dogs, and luncheon meats. ? Regular, hard, and processed cheese and regular peanut butter. ? Crackers with salted tops, and other salted snack foods such as pretzels, chips, and salted popcorn. ? Frozen prepared meals, unless labeled low-sodium. ? Canned and dried soups, broths, and bouillon, unless labeled sodium-free or low-sodium. ? Canned vegetables, unless labeled sodium-free or low-sodium. ?  [place] fries, pizza, tacos, and

## 2019-04-18 ENCOUNTER — HOSPITAL ENCOUNTER (OUTPATIENT)
Dept: CARDIAC REHAB | Age: 69
Setting detail: THERAPIES SERIES
Discharge: HOME | End: 2019-04-18
Payer: MEDICARE

## 2019-04-18 VITALS — BODY MASS INDEX: 44.06 KG/M2 | WEIGHT: 273 LBS

## 2019-04-18 PROCEDURE — 93798 PHYS/QHP OP CAR RHAB W/ECG: CPT

## 2019-04-23 ENCOUNTER — HOSPITAL ENCOUNTER (OUTPATIENT)
Dept: CARDIAC REHAB | Age: 69
Setting detail: THERAPIES SERIES
Discharge: HOME | End: 2019-04-23
Payer: MEDICARE

## 2019-04-23 VITALS — BODY MASS INDEX: 44.26 KG/M2 | WEIGHT: 274.2 LBS

## 2019-04-23 PROCEDURE — 93798 PHYS/QHP OP CAR RHAB W/ECG: CPT

## 2019-04-25 ENCOUNTER — HOSPITAL ENCOUNTER (OUTPATIENT)
Dept: CARDIAC REHAB | Age: 69
Setting detail: THERAPIES SERIES
Discharge: HOME | End: 2019-04-25
Payer: MEDICARE

## 2019-04-25 VITALS — BODY MASS INDEX: 44.06 KG/M2 | WEIGHT: 273 LBS

## 2019-04-25 PROCEDURE — 93798 PHYS/QHP OP CAR RHAB W/ECG: CPT

## 2019-04-30 ENCOUNTER — HOSPITAL ENCOUNTER (OUTPATIENT)
Dept: CARDIAC REHAB | Age: 69
Setting detail: THERAPIES SERIES
Discharge: HOME | End: 2019-04-30
Payer: MEDICARE

## 2019-04-30 VITALS — WEIGHT: 276 LBS | BODY MASS INDEX: 44.55 KG/M2

## 2019-04-30 PROCEDURE — 93798 PHYS/QHP OP CAR RHAB W/ECG: CPT

## 2019-05-02 ENCOUNTER — HOSPITAL ENCOUNTER (OUTPATIENT)
Dept: CARDIAC REHAB | Age: 69
Setting detail: THERAPIES SERIES
Discharge: HOME | End: 2019-05-02
Payer: MEDICARE

## 2019-05-02 VITALS — WEIGHT: 276 LBS | BODY MASS INDEX: 44.55 KG/M2

## 2019-05-02 PROCEDURE — 93798 PHYS/QHP OP CAR RHAB W/ECG: CPT

## 2019-05-07 ENCOUNTER — APPOINTMENT (OUTPATIENT)
Dept: CARDIAC REHAB | Age: 69
End: 2019-05-07
Payer: MEDICARE

## 2019-05-09 ENCOUNTER — HOSPITAL ENCOUNTER (OUTPATIENT)
Dept: CARDIAC REHAB | Age: 69
Setting detail: THERAPIES SERIES
Discharge: HOME | End: 2019-05-09
Payer: MEDICARE

## 2019-05-09 VITALS — BODY MASS INDEX: 44.55 KG/M2 | WEIGHT: 276 LBS

## 2019-05-09 PROCEDURE — 93798 PHYS/QHP OP CAR RHAB W/ECG: CPT

## 2019-05-09 NOTE — PROGRESS NOTES
[address] [facility] Facility-Based Therapy for Heart Failure  INDIVIDUAL TREATMENT PLAN (ITP)     NAME: Fatemeh Laughlin Marcial  YOB: 1950  Account Number: [de-identified]  AGE: 78 y.o. Diagnosis:Chronic Systolic Heart   FailPhase 1 @ [facility]: No    CMS Guidelines:  [x]  Left ventricular ejection fraction is < or equal to 35%  [x]  NYHA class II-IV symptoms despite being on optimal heart failure therapy for at least 6 weeks  [x] Stable=have not had recent (< 6 weeks) or planned (< 6 months) major cardiovascular hospitalizations or procedures       GLOSSARY: PF= Physical Fitness  TM=Treadmill  AD= Schwinn AirQ-go Bike  UBE=Upperbody Ergometry LBE=Lowerbody Ergometer  NS=NuStep SF= SciFit  ST=Step Training  RT=Resistance Training   PLB=Pursed Lip Breathing   DY= Dynabands  HW= Handweights   Cybex RT= Cybex Mult istation weight machine   RB=Recumbent    REFERRING PHYSICIAN: Dr. Kahn Base History:     Past Medical History:   Diagnosis Date    [facility]     COPD (chronic obstructive pulmonary disease) [facility] [address]     Hypertension        Surgical History:     Past Surgical History:   Procedure Laterality Date    CARPAL TUNNEL RELEASE      CHOLECYSTECTOMY      KNEE SURGERY      TONSILLECTOMY      TUBAL LIGATION         Major Symptoms: Shortness of breath, orthopnea, fatigue, fluid retention    Oxygen Therapy: N/A     Home O2    lpm  []  Prn  [] continuous  []  HS     CPAP []  BiPAP     Company:          Comments:    Occupational/ Recreational Activities:    Employment Status:  []  FT  []  PT  [] Disabled [x] Retired    Comments:       Occupation: , 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[place] [address]          Hobbies/Leisure activities: making jewelry, puzzles, computer games    Social/Spiritual:        Social History     Socioeconomic History    Marital status:       Spouse name: Not on file    Number of children: Not on file    Other Comments:    Physical Assessment:    Color: [x]  natural []  pale [] cyanotic []  flushed []  jaundice    Skin Integrity [x]  intact [] other:    Heart Rate 82  Resp Rate 18      Breath Sounds: CTA    Blood Pressures Sitting: Rt arm 100/66  Left arm: 92/64       Standing Rt arm   Left arm: 102/70    Resting SpO2: 94% ra Resp effort/pattern: Easy/Regular at rest, dyspneic with exertion      Peripheral pulses: Yes    Edema:  No    Numbness/tingling:  No    Orthopedic/exercise limitations:  Yes- Needs RTK repacement    Psychosocial assessment:    Support System: Sister Nnamdi Mckeon, daughter [address] [facility]    Physical/Behavioral signs of abuse/neglect:No    Advance Directives:  No  [] info given- declined    Learning Preferences: Primary Language:English        Preferred method of learning []  video []  handouts        [] listening/lecture   [x]  all    Memory impairment?   No     EXERCISE  Initial Assessment  Day 1 EXERCISE  Intervention  Day 2-30 EXERCISE  Re-Assessment  Day 31-60 EXERCISE  Re-Assessment  Day 61-90+ EXERCISE  Last Day   Date:   1/10/19 Date: 2/7/19 Date: 3/7/19 Date: 4/9/19 Date:           Pre Rehab  6 MWT  400 ft = 26% pred (reduced)  1.5 METs  SpO2: 97%    0.75  MPH   HR: 115bpm      RPD: 5, RPE: 6                                           Post Rehab   6 MWT  ft = % pred   METs  SpO2: %  MPH  HR:  bpm      RPD:  , RPE:                                        GOALS  List at least 2 patient specific goals    [x]Improve activity and tolerance for routine tasks and walking    [x]Learn when to call a healthcare provider using the HF zones (green, yellow, red)    []Record daily weights as instructed    []Learn when to notify MD of weight gain (3# overnight or 5# in a week)    [x] Can verbalize dry weight    []Learn which signs/symptoms can represent fluid overload    []Learn and verbalize a low salt diet    [] Learn the reason for fluid restriction/ know fluids should be limited to less than 48-64 oz    [] Medication Compliance    [] Can verbalize why they take each med    [] Compliance with follow up care     []  Can verbalize risk factors for heart disease      [x] \"Would like to be strong enough to get my Knee replacement\"                              Learning Barrier(s)  [] Speech           [] Literacy              [] Hearing          [] Cognitive            [] Vision             [x]  Ready to Learn          Balance Issues  [] Y  [x] N            Orthopedic Issues  [x]  Y[]  N        Rate your Physical   Fitness (PF)?    4/10    Handgrip:  Right:18  Left:20    EDUCATION  [x]  Staff Introduction  [x] Proper placement of telemetry monitor  []  Proper setup/O2 use   [x]  Equipment Erwin'n  [x]  RPD/RPE Explain  [x]  Proper use of scale  [x] Explain wt trends: Rehab vs home AM weight vs MD office weight    [x]  Explain Intensity  [x] Initial Levels set GOALS        1. Doing more than previously did    2. Yes, 5# increase in 1 week, increased cough, increased dyspnea    3. Dry weight=267.4lbs    4. No knee replacement yet    5. Quit smoking                                If Patient has a Learning Barrier, action:                   If pt has balance or orthopedic issues:  Interventions:                    Rate your physical   fitness (PF)?:   5-6/10        -30 day PF goal:  6-7/10    EDUCATION  [] Understands proper set/up O2 use  []  Understands SpO2 and RPD? [] Aerobic progression explained? []  Intensity progression explained? GOALS                1. Yes, except for rt knee and left arm/shoulder    2. Yes- verbalizes Increased wt, cough, low energy, increased SOB    3. 257lbs    4. Not yet    5.  Yes!- Quit on 2/14/19                                                                           Rate your physical   fitness (PF)?:   6/10        -30 day PF goal: 7/10    EDUCATION   [x]  Home Activity education offered  [x] Stretching/ Flexibility education offered  [] Attended Benefits of Exercise Class  [x] Attended Resistance Training Class                                                    GOALS              Pt not present today to ask about goals. Rate your physical   fitness (PF)?:   /10    -30 day PF goal:  /10    EDUCATION  [] Attended all individually relevant exercise education sessions? []  Knows current exercise goals and recmndtns?:   [] Refer to Vickie Longop. 18.? Rate your physical fitness now?:     /10  Handgrip:  Right:  Left:    Discharge  EDUCATION  []  Attended all individually relevant exercise education sessions?    [] Knows current exercise goals and recmndtns?:                                                                                        PHYSICIAN DIRECTED   EXERCISE RX     RPE : 11 - 14  Titrate O2 to keep SpO2 >89%    Frequency (F): 2-3x/wk in Rehab,         Initial Intensity :  METs (from 6MWT)    60-80% of walk speed for TM-N/A    Type (T): Aerobic (TM,NS, SF, AE, AB, RB, EL, Arc), RT 1-3#, 8-16 reps    CAN USE ALL EQUIPMENT EXCEPT   Treadmill    Time (Ti): Aerobic:   15-40 min               Progression: 1-2 min total time for TM, AB, NS, SF or Arm ergometer per session or when a steady state has occurred in RPE if  SpO2 and HR levels are acceptable           PHYSICIAN DIRECTED   EXERCISE RX       Titrate O2 to keep SpO2 >89%    Frequency (F): 2-3x/wk in Rehab, 1-3x/wk home walking       Intensity (I):  Initial + 5-10% increase each week or when a steady state has occurred in RPE if  SpO2 and HR levels are acceptable  Type (T)  Aerobic (TM,NS, SFR,SFS, AE, AB, RB), RT   8-16 reps    CAN USE ALL EQUIPMENT EXCEPT treadmill- due to bad knee        Time (Ti): Aerobic:   30-40 min        Progression:   1-2 min total time for TM, AB, NS, SF or Arm ergometer per session or when a steady state has occurred in RPE if  SpO2 and HR levels are acceptable        Is pt. progressing in rehab?:  Yes, slowly              PHYSICIAN DIRECTED   EXERCISE RX       Titrate O2 to keep SpO2 >89%    Frequency (F): 2-3x/wk in Rehab, 1-3x/wk home walking       Intensity (I):  Initial + 5-10% increase each week when a steady state has occurred in RPE if  SpO2 and HR levels are acceptable  Type (T)  Aerobic (TM,NS, SFR,SFS, AE, AB, RB), RT   8-16 reps    CAN USE ALL EQUIPMENT EXCEPT TM and elliptical due to knee        Time (Ti): Aerobic:   30-50 min     Progression:   1-2 min total time for TM, AB, NS, SF or Arm ergometer per session or when a steady state has occurred in RPE if  SpO2 and HR levels are acceptable              Is pt. progressing in rehab?:  Yes, slowly               PHYSICIAN DIRECTED   EXERCISE RX       Titrate O2 to keep SpO2 >89%    Frequency (F): 2-3x/wk in Rehab, 1-3x/wk home walking       Intensity (I):  Initial + 5-10% increase each week when a steady state has occurred in RPE if  SpO2 and HR levels are acceptable  Type (T):   Aerobic (TM,NS, SFR,SFS, AE, AB, RB), RT   8-16 reps    CAN USE ALL EQUIPMENT EXCEPT          Time (Ti): Aerobic:   30-58 min         Progression:   1-2 min total time for TM, AB, NS, SF or Arm ergometer per session or when a steady state has occurred in RPE if  SpO2 and HR levels are acceptable            Is pt. progressing in rehab?:  yes             Final Intensity (I): See below and final 6MWT above            ACHIEVED TOTAL AEROBIC EXERCISE TIME:  min         FINAL LEVELS:  [] TM: min  [] Nustep: level  min  [] Arm ergometer: dockery min  [] Scifit: level min  [] HW :  # x  reps  [] Dy:    X   reps  [] Cybex RT:    # x   Reps  [] Eliptical:level  X  Min  [] Arc: level   X    mins  [] RB:  Level  X   mins  [] AB: level   X     Min              Did pt. progress in rehab?:     30 DAY TARGET GOAL  [x] Start slowly but progress each week  [x] Increase duration on patterns, apnea    [] Anemia    [] Hypertension Management     []  CAD    [] Myocardial infarction    [] Cardiac Bypass    [] PCI with Stent    [] Valve Replacement         Intervention/  Education: Smokes out of boredom. Plans to resume making jewelry as new hobby to replace smoking. Has javier                     [facility] INDIVIDUAL TREATMENT PLAN  OXYGEN AND MEDICATIONS    OXYGEN  MEDICATIONS   Initial Assessment  Day 1 OXYGEN  MEDICATIONS  Intervention  Day 2-30 OXYGEN  MEDICATION  Re-Assessment  Day 31-60 OXYGEN  MEDICATION  ReAssessment Day 61-90+ OXYGEN  MEDICATION  Discharge  Final Day                    Medications  []  Uses as prescribed  []  Non- compliant with prescribed meds  []  Proper use   and technique of MDI, DPI and/or nebs  []  Incorrect use and technique of MDI, DPI and /or nebs    Hypoxemia  Problem:    [x]  No problem  [] Noncompliant with O2 use  []  Oxygen in MD only  []  No home O2  []  No portable O2  []  Needs O2 prescription and O2 qualifying data sent for setup   [] Poor knowledge of O2 use/safety    Current Oxygen Prescription:    l/min rest   l/min exercise   titration   plan/weaning plan:  CPAP/BIPAP:    Goals:     []  Manage Hypoxemia  []  receive adequate portable system  []  Compliant with use as prescribed  []  Learn O2 safety guidelines   Medications  [x]  Uses as prescribed  []  Non- compliant with prescribed meds    Respiratory Medications    [] Proper use and technique of MDI, DPI and/or nebs  []  Incorrect use and technique of MDI, DPI and /or nebs    Hypoxemia  Problem: N/A      Current Oxygen Prescription:    l/min rest   l/min exercise   titration plan/weaning plan:    Goals:   []  Manage Hypoxemia  []  receive adequate portable system  []  Compliant with use as prescribed  []  Learn O2 safety guidelines           Medications  [x]  Uses as prescribed  [] Non- compliant with prescribed meds    Respiratory Medications    []  Proper use and technique of MDI, DPI and/or has had Nutrition class? [] Informal questions asked regarding nutrition/weight control Intervention    []  Pt has had Nutrition class? [] Questions addressed Intervention    []  Pt has had Nutrition class? Intervention    Pt aware of:     []   Low Sodium diet   [] Fluid restriction  [] Smart choices, Calories   [] Wt gain / loss strategies       GOALS    Weight Loss         30 DAY TARGET GOAL:    [x] Decrease portion size by 250-500 calories/day    [x] Eat less sweets      Reasonable, achievable 30 day weight goal: 262 lbs   (1-2 lb per week is recommended)            Weight Gain        30 day   Target Goal:    [] increase proteins  [] add nutrition  Supplement  [] 6 small meals     Rate Your Plate [id number]     Did pt meet Initial 30 day Target Goal(s)?:     New 30 DAY TARGET GOAL:    [] Decrease saturated fats  [] Comply with fluid restriction  [] Comply with low sodium diet  -  Reasonable, achievable 30 day weight goal: 269lbs     Did pt meet previous 30 day Target   Goal(s)?:     New 30 DAY TARGET GOAL:    [] Switch to whole grains whenever possible  [] Decrease/ Eliminate carbonated beverages    Reasonable, achievable 30 day weight goal: 274lbs   Did pt meet previous 30 day Target   Goal(s)?:      New 30 DAY TARGET GOAL:    [] Smaller meals more frequently  [] Decrease portion sizes by 25%      Reasonable, achievable 30 day weight goal:                  Did pt meet previous 30 day Target   Goal(s)?:                         [facility] INDIVIDUAL TREATMENT PLAN  PSYCHOSOCIAL DOMAIN:    Psychosocial   Initial Assessment  Day 1 Psychosocial   Intervention  Day 2-30 Psychosocial  Re-Assessment  Day 31-60 Psychosocial   Re-Assessment  Day 61-90+ Psychosocial Discharge  Final Day   Psychosocial Screening Tools    (X) PHQ-9 score: 7      (X) SF-36:        (X) UCSD SOB score: 83         PHQ-9 Score: If score >or =15, Action:     SF-36 Mental Score:     UCSD Score:             Any action on Screening Tools?: ability to complete ADL  [] Encourage pt to rate exercises truthfully to encourage correct intensity / duration prescription  -  (0 being 'None', 10 being 'Very'),  -How would you rate your Anxiety Level: 0      -How would you rate your level of depression: 4/10- due to need for transportation/  dependence on others      -How would you rate your mood: 9/10      ADLs: 8/10- due to shoulder and knee  GOALS        Did pt meet previous 30 day Target Goal(s)?:      RE- ASSESSMENT GOAL    [x] Decrease/  Maintain anxiety and depression level  [] Increase ability to complete ADL  -          (0 being 'None', 10 being 'Very'),  -How would you rate your Anxiety Level:4/10- due to shoulder pain    -How would you rate your level of depression: 0    -How would you rate your mood:  9/10    ADLs: 8/10 GOALS        Did pt meet previous 30 day Target Goal(s)?:      RE- ASSESSMENT GOAL    [] Decrease/  Maintain anxiety and depression level  [] Increase ability to complete ADL  -          (0 being 'None', 10 being 'Very'),  -How would you rate your Anxiety Level:      -How would you rate your level of depression:    -How would you rate your mood:     Discharge            Did pts SF-36 Mental Score increase?:          [] Pt is aware of the s/s of depression    [] Received Psychosocial Education    Any follow up with physicians  necessary?:      [] Y    [] N               [facility] INDIVIDUAL TREATMENT PLAN  EDUCATION DOMAIN:    EDUCATION   Initial Assessment  Day 1 EDUCATION   Intervention  Day 2-30 EDUCATION   Re-Assessment  Day 31-60 EDUCATION   ReAssessment Day 61-90+ EDUCATION Discharge  Final Day                      Education  [x] Equipment/Staff Orientation  [x] Importance of Clean Hands    Chronic Cough?:   [] Y   [x]  N  Sleep Apnea?:  [] Y    [] N     [x] Education Book given       Education  Individual instruction    [] RPD/RPE   [] O2 use  []Home Activity/Warm up/ stretches  Attend Classes:  [] Nutrition    [] Panic control, relaxation, managing depression    [] Resistance Training  []  Benefits of Exercise    [] Energy Cons    [] Medications    [] Risk factors for Heart Disease  [] Anatomy and function of the heart   [] Interventions  [] Managing heart failure          Education  Individual instruction    [x] RPD/RPE   [] O2 use      [x] Home Activity/Warm up/ stretches  Attend Classes:  [] Nutrition    []  Panic control, relaxation, managing depression       [x]   Resistance Training  [] Benefits of Exercise    [] Energy Cons          [] Risk factors for Heart Disease  [x] Anatomy and function of the heart   [] Interventions  [] Managing heart failure       Education  Individual instruction    [] RPD/RPE   []  O2 use    [] Home Activity/Warm up/ stretches  Attend Classes:  [] Nutrition  []  Panic control, relaxation, managing depression    []  Resistance Training  [] Benefits of Exercise  [] Energy Cons          [] Risk factors for Heart Disease  [] Anatomy and function of the heart   [] Interventions  [] Managing heart failure   Education  []  Addressed pt questions on Education Topics  [] RPD/RPE   []  O2 use    [] Home Activity/Warm up/ stretches            Attend Classes:  [] Nutrition  []  Panic control, relaxation, managing depression    []  Resistance Training  [] Benefits of Exercise  [] Energy Cons          [] Risk factors for Heart Disease  [] Anatomy and function of the heart   [] Interventions  [] Managing heart failure                                         Physician Interaction / Response:  (If none, continue on present care plan)     Physician Interaction / Response:   (If none, continue on present care plan)   Physician Interaction / Response:   (If none, continue on present care plan)   Physician Interaction / Response:   (If none, continue on present care plan)   Physician Interaction / Response:       Discharge the patient.          Rehab Potential:  [x]  Good [] Fair [] Poor    Summary 1/10/19: Mookie Nicole is a very pleasant 70 yr old female with newly diagnosed Chronic systolic HF who was referred to Heart Failure Rehab. She presents here today with her sister for her initial evaluation. Natividad Lopez presented to [facility] [place]  [facility] on 11/26/18 for shortness of breath and orthopnea. Echocardiogram showed  LVEF 30% and global hypokinesis. She was hospitalized 4 days and was diuresed >5L. Since being discharged, she reports overall she is doing much better. Orthopnea has resolved and sob is improved but does still have exertional dyspnea. Today her wt is 271# (dry wt is 276) and she appears euvolemic. A 6 min walk test was performed with cardiac monitoring for a distance of only 400 ft. She did have moderate sob and severe osteoarthritis pain in her right knee during test. She reports that she was scheduled to have knee replacement surgery but this is now on hold due to her new HF diagnosis. She will participate in 36 sessions of HF Rehab twice weekly. Weight: 271.9#. Dry wt 276# She reports that she weighs herself daily and is aware of 3/5 rule. Exercise:15-40 min of exercise starting at low levels with continuous cardiac monitoring. Time and intensity to be increased based on RPE and HR response. Cardiac monitoring during 6 min walk revealed NSR. Oxygen: She will exercise on room air as demonstrated by 6 min walk. Medications affecting HR: Coreg 6.25mg twice daily, Combivent MDI 1 puff q 6hrs prn     Nutrition:Holli reports she is following a LS diet and does stay under her 48-64 oz FR. She will attend HF Nutrition class during HF rehab. Psychosocial including ability to complete ADL's, Depression/Anxiety and Social Functioning:Holli lives with her daughter and has a twin sister, Tameka Phan, who she relies on heavily for support and with whom she will be attending rehab with Oumar Maurer is in the CR Maintenance program). She denies anxiety but does rate her depression 6/10.  She states this is due to her current health condition and the limitations it has on her. She did become tearful when discussing this but stated \"I have good days and bad days, but mostly good days\". She rates her mood as mostly positive 7/10. She is eager to begin HF rehab to increase her endurance and hopefully improve her HF enough to have her RTK replacement surgery. Other: HF education was reviewed with Oscar Rodriguez including HF Zones, s/s of flare up, medication review, LS diet, FR and daily weights. Educational handouts given. She is enrolled with the [address] [place], [place] [address]lisette Zamora RN.      2/7/19: 30 Day Report:  Summary: Pt attended only 5 exercise sessions due to weather and lack of transportation. Has info on resistance training. Exercise: Pt exercises for 40 minutes at low levels. She is limited by shoulder and knee problems. Oxygen: Exercises with no oxygen. Saturations are in the mid-upper 90s. Medications: Takes all as prescribed. No recent changes. Nutrition: Weight=273.4lbs. Understands heart failure zones and when to call healthcare provider. Psychosocial: Reports no anxiety, depression =4/10 due to dependence on others for transportation. Mood is mostly positive. Other: ADLs reported at 8/10 and is mostly positive. States ADLs are easier since starting rehab. Still smoking but states quit date=2/14/19. Ruma Robledo    60 Day Report: 3/7/19:  Summary: Oscar Rodriguez has attended 13 exercise sessions and 2 education classes. Exercise: She exercises at low levels for 35-40 minutes because of severe pain in her left shoulder and right knee. Oxygen: None required with exercise. Room saturations above 90%. Medications: Takes as prescribed with no recent changes. Nutrition: Weight=278lbs. Familiar with fluid and sodium restrictions. Have had discussions about eating smaller meals more often and not skipping meals. Psychosocial: Reports moderate anxiety due to shoulder and knee pain.    Other: Patient is 3 weeks smoke-free!   - JULI Rolle Ed.    90 Day Report: 4/9/19:   Summary: Odin Heck has completed 20 exercise sessions and 3 education classes. Exercise: She completes 30 min of low level exercise and is limited in modalities due to knee and shoulder issues. Oxygen: None needed for exercise. Saturations are adequate on room air. Medications: Unable to discuss with pt as called in ill today. Nutrition: Wt on 4/4/19= 272lbs. Psychosocial: Unable to ask pt. - HOOD BookerEd    120 Day Report 5/9/2019:  Summary: Odin Heck has completed 28 exercise days / 4 education sessions  Exercise: She completes 30-40 min of exercise. Exercise intervals are limited due to her left shoulder pain and right knee pain. She states she does try to exercise at home. She has shoulder exercises to complete daily. Oxygen: All exercise is completed on room air. Medication: Reports no medication changes. Nutrition:  Current weight: 276#   Maintains a fluid restriction and low sodium diet. Psychosocial:  Reports anxiety: 0 / Depression: 0  States she remains \"smoke-free\" but admits to sneaking a cigarette a couple times. Goal: Continue to progress Odin Heck as tolerated. Prepare for completion of the program.. Odin Heck plans to continue exercise in our maintenance program with her sister. Also, she plans to exercise at the [facility]. -Estrellita HIGGINS                         Referring Physician:                                               Fax #:    Reviewed and electronically signed by Dr Kehinde Negron Director, Cardiac Rehab

## 2019-05-14 ENCOUNTER — HOSPITAL ENCOUNTER (OUTPATIENT)
Dept: CARDIAC REHAB | Age: 69
Setting detail: THERAPIES SERIES
Discharge: HOME | End: 2019-05-14
Payer: MEDICARE

## 2019-05-14 VITALS — BODY MASS INDEX: 44.22 KG/M2 | WEIGHT: 274 LBS

## 2019-05-14 PROCEDURE — 93798 PHYS/QHP OP CAR RHAB W/ECG: CPT

## 2019-05-16 ENCOUNTER — HOSPITAL ENCOUNTER (OUTPATIENT)
Dept: NON INVASIVE DIAGNOSTICS | Age: 69
Discharge: HOME | End: 2019-05-16
Payer: MEDICARE

## 2019-05-16 DIAGNOSIS — I50.22 CHRONIC SYSTOLIC HEART FAILURE (HCC): ICD-10-CM

## 2019-05-16 LAB
LV EF: 23 %
LVEF MODALITY: NORMAL

## 2019-05-16 PROCEDURE — 6360000004 HC RX CONTRAST MEDICATION: Performed by: INTERNAL MEDICINE

## 2019-05-16 PROCEDURE — C8929 TTE W OR WO FOL WCON,DOPPLER: HCPCS

## 2019-05-16 RX ADMIN — PERFLUTREN 1.5 ML: 6.52 INJECTION, SUSPENSION INTRAVENOUS at 11:19

## 2019-05-20 NOTE — PROGRESS NOTES
[address]      Cardiology Consult    Elizabeth Lee  1950    May 21, 2019    Reason for Visit: CHF    CC: \"I thought I was getting better. \"     HPI:  The patient is 78 y.o. female with a past medical history significant for COPD, CHF, and HTN who presents today for management of heart failure. She presented to the hospital 11/2018 with complaints of progressive shortness of breath. The NUGENT worsened over several weeks to months but acutely worsened in the few days prior to admission. She was also diagnosed with COPD. The echo showed an EF of 30%. She had an angiogram in 2004 which showed a reduced EF but she was unaware of that finding. She was diuresed and the shortness of breath improved. Her repeat echo showed an LVEF of 25%. Today, she denies any new sounding cardiac complaints. She states she is feeling well and tolerating her medications. She reports her weight remains stable and denies any worsening shortness of breath. She recently had 3 of her nails removed on her hands and wearing bandages. She is accompanied by her daughter. She is tearful today in office after reviewing the results of her echocardiogram. She continues to participate in cardiac rehab and states she feels it is helping. She utilizes a cane with ambulation. She reports compliance with her medications. She denies any lightheadedness or syncopal episodes. Patient denies exertional chest pain/pressure, PND, palpitations, changes in LE edema, or weight changes.      Past Medical History:   Diagnosis Date    CHF (congestive heart failure) (HCC)     COPD (chronic obstructive pulmonary disease) (HCC)     Hypertension      Past Surgical History:   Procedure Laterality Date    CARPAL TUNNEL RELEASE      CHOLECYSTECTOMY      KNEE SURGERY      TONSILLECTOMY      TUBAL LIGATION       Family History   Problem Relation Age of Onset    Hypertension Sister      Social History     Tobacco Use    Smoking status: Former Smoker     Packs/day: 0.75     Types: Cigarettes     Last attempt to quit: 2019     Years since quittin.2    Smokeless tobacco: Never Used   Substance Use Topics    Alcohol use: No    Drug use: No       Allergies   Allergen Reactions    Asa [Aspirin] Swelling    Pcn [Penicillins]     Shellfish Allergy Swelling    Sulfa Antibiotics      Current Outpatient Medications   Medication Sig Dispense Refill    oxyCODONE-acetaminophen (PERCOCET) 5-325 MG per tablet Take 1 tablet by mouth every 4 hours as needed for Pain.  sacubitril-valsartan (ENTRESTO)  MG per tablet Take 1 tablet by mouth 2 times daily 60 tablet 5    carvedilol (COREG) 12.5 MG tablet Take 1 tablet by mouth 2 times daily 180 tablet 5    furosemide (LASIX) 20 MG tablet Take 1 tablet by mouth as needed (take as needed for weight gain) 90 tablet 3    acetaminophen (TYLENOL) 500 MG tablet Take 500 mg by mouth every 6 hours as needed for Pain Max 4,000mg total acetaminophen per 24 hours.  acetaminophen (TYLENOL) 650 MG extended release tablet Take 650 mg by mouth every 8 hours as needed for Pain Max 4,000mg total acetaminophen per 24 hours.       magnesium (MAGNESIUM-OXIDE) 250 MG TABS tablet Take 250 mg by mouth daily      Biotin 5000 MCG TABS Take 1 tablet by mouth daily      vitamin D (CHOLECALCIFEROL) 5000 units CAPS capsule Take 5,000 Units by mouth daily      vitamin B-12 (CYANOCOBALAMIN) 1000 MCG tablet Take 1,000 mcg by mouth daily      Vitamin A 8000 units TABS Take 1 tablet by mouth daily      vitamin C (ASCORBIC ACID) 500 MG tablet Take 500 mg by mouth daily      Cranberry-Vitamin C (CRANBERRY CONCENTRATE/VITAMINC PO) Take 2 capsules by mouth daily 25,200mg  With vit C 40mg      Multiple Vitamins-Minerals (CENTRUM SILVER PO) Take 1 tablet by mouth daily      atorvastatin (LIPITOR) 10 MG tablet Take 1 tablet by mouth nightly 90 tablet 2    ranitidine (ZANTAC) 150 MG tablet Take 150 mg by mouth 2 times daily  latanoprost (XALATAN) 0.005 % ophthalmic solution Place 1 drop into both eyes nightly      gabapentin (NEURONTIN) 800 MG tablet Take 800 mg by mouth 3 times daily. [place] albuterol-ipratropium (COMBIVENT RESPIMAT)  MCG/ACT AERS inhaler Inhale 1 puff into the lungs every 6 hours as needed for Wheezing or Shortness of Breath      hydrocortisone 1 % cream Apply 1 each topically 3 times daily as needed (itching/rash)       No current facility-administered medications for this visit. Review of Systems:  · Constitutional: no unanticipated weight loss. There's been no change in energy level, sleep pattern, or activity level. No fevers, chills. · Eyes: No visual changes or diplopia. No scleral icterus. · ENT: No Headaches, hearing loss or vertigo. No mouth sores or sore throat. · Cardiovascular: as reviewed in HPI  · Respiratory: No cough or wheezing, no sputum production. No hematemesis. · Gastrointestinal: No abdominal pain, appetite loss, blood in stools. No change in bowel or bladder habits. · Genitourinary: No dysuria, trouble voiding, or hematuria. · Musculoskeletal:  No gait disturbance, no joint complaints. · Integumentary: No rash or pruritis. · Neurological: No headache, diplopia, change in muscle strength, numbness or tingling. · Psychiatric: No anxiety or depression. · Endocrine: No temperature intolerance. No excessive thirst, fluid intake, or urination. No tremor. · Hematologic/Lymphatic: No abnormal bruising or bleeding, blood clots or swollen lymph nodes. · Allergic/Immunologic: No nasal congestion or hives.     Physical Exam:   /74 (Site: Left Upper Arm, Position: Sitting, Cuff Size: Large Adult)   Pulse 71   Ht 5' 6\" (1.676 m)   Wt 275 lb (124.7 kg) Comment: with shoes  SpO2 98%   BMI 44.39 kg/m²   Wt Readings from Last 3 Encounters:   05/21/19 275 lb (124.7 kg)   05/14/19 274 lb (124.3 kg)   05/09/19 276 lb (125.2 kg)     Constitutional: She is an obese female who is oriented to person, place, and time. In no acute distress. Head: Normocephalic and atraumatic. Pupils equal and round. Neck: Neck supple. No JVP or carotid bruit appreciated. No mass and no thyromegaly present. No lymphadenopathy present. Cardiovascular: Normal rate. Normal heart sounds. Exam reveals no gallop and no friction rub. No murmur heard. Pulmonary/Chest: Effort normal and breath sounds normal. No respiratory distress. She has no wheezes, rhonchi or rales. Abdominal: Soft, non-tender. Bowel sounds are normal. She exhibits no organomegaly, mass or bruit. Extremities: No edema, cyanosis, or clubbing. Pulses are 2+ radial/carotid bilaterally. Neurological: No gross cranial nerve deficit. Coordination normal.   Skin: Skin is warm and dry. There is no rash or diaphoresis. Psychiatric: She has a normal mood and affect. Her speech is normal and behavior is normal.     Lab Review:   FLP:    Lab Results   Component Value Date    TRIG 81 11/27/2018    HDL 55 11/27/2018    LDLCALC 89 11/27/2018    LABVLDL 16 11/27/2018     BUN/Creatinine:    Lab Results   Component Value Date    BUN 18 01/29/2019    CREATININE 1.0 01/29/2019       EKG Interpretation: 11/26/18 Reviewed, Sinus rhythm with premature supraventricular complexes. Left bundle branch block    Imaging:  Cath: 2004 ([place])  The left main coronary artery was angiographically normal.   The left anterior descending artery is widely patent, tapers relatively quickly, but is without significant disease. The circumflex coronary artery is a dominant vessel and is angiographically normal.  The right coronary artery is small, technically nondominant, and is angiographically normal.  A left ventriculogram shows mild diffuse hypokinesis. LVEF is estimated at 45%. There is no mitral insufficiency.   There is no gradient on pullback across the aortic valve.     Abd CT: 10/2015  Atherosclerotic calcifications are present within   the abdominal aorta and May also have chronic NUGENT related to COPD. 4) NSVT. Continue B-blocker. Ultimately will repeat echo for ICD evaluation. 5) Essential hypertension. Controlled. Goal BP <130/80. Continue medical therapy. 6) Morbid obesity. BMI 44.5. Encouraged weight loss. 7) Chronic pain. Instructed to avoid the use of NSAIDs if possible. Acetaminophen is preferred agent but will defer pain management to PCP.      Follow up with Dr. Eliane Mix as scheduled. Follow up in 6 months     Thank you very much for allowing me to participate in the care of your patient. Please do not hesitate to contact me if you have any questions. Sincerely,  Margy Estimable. Lexie Soriano, [address] [facility], [address] [place] [address]  Ph: [phone]  Fax: [phone]    This note was scribed in the presence of Dr Lexie Soriano MD by Krystal Palmer, RN. Physician Attestation: The scribes documentation has been prepared under my direction and personally reviewed by me in its entirety. I confirm that the note above accurately reflects all work, treatment, procedures, and medical decision making performed by me.

## 2019-05-21 ENCOUNTER — OFFICE VISIT (OUTPATIENT)
Dept: CARDIOLOGY CLINIC | Age: 69
End: 2019-05-21
Payer: MEDICARE

## 2019-05-21 VITALS
BODY MASS INDEX: 44.2 KG/M2 | SYSTOLIC BLOOD PRESSURE: 134 MMHG | HEART RATE: 71 BPM | WEIGHT: 275 LBS | HEIGHT: 66 IN | DIASTOLIC BLOOD PRESSURE: 74 MMHG | OXYGEN SATURATION: 98 %

## 2019-05-21 DIAGNOSIS — J44.9 CHRONIC OBSTRUCTIVE PULMONARY DISEASE, UNSPECIFIED COPD TYPE (HCC): ICD-10-CM

## 2019-05-21 DIAGNOSIS — I10 ESSENTIAL HYPERTENSION: ICD-10-CM

## 2019-05-21 DIAGNOSIS — I47.29 NSVT (NONSUSTAINED VENTRICULAR TACHYCARDIA): ICD-10-CM

## 2019-05-21 DIAGNOSIS — I73.9 PAD (PERIPHERAL ARTERY DISEASE) (HCC): ICD-10-CM

## 2019-05-21 DIAGNOSIS — F17.218 CIGARETTE NICOTINE DEPENDENCE WITH OTHER NICOTINE-INDUCED DISORDER: ICD-10-CM

## 2019-05-21 DIAGNOSIS — I50.22 CHRONIC SYSTOLIC HEART FAILURE (HCC): Primary | ICD-10-CM

## 2019-05-21 PROCEDURE — G8427 DOCREV CUR MEDS BY ELIG CLIN: HCPCS | Performed by: INTERNAL MEDICINE

## 2019-05-21 PROCEDURE — 1036F TOBACCO NON-USER: CPT | Performed by: INTERNAL MEDICINE

## 2019-05-21 PROCEDURE — 1090F PRES/ABSN URINE INCON ASSESS: CPT | Performed by: INTERNAL MEDICINE

## 2019-05-21 PROCEDURE — G8417 CALC BMI ABV UP PARAM F/U: HCPCS | Performed by: INTERNAL MEDICINE

## 2019-05-21 PROCEDURE — 3017F COLORECTAL CA SCREEN DOC REV: CPT | Performed by: INTERNAL MEDICINE

## 2019-05-21 PROCEDURE — 1123F ACP DISCUSS/DSCN MKR DOCD: CPT | Performed by: INTERNAL MEDICINE

## 2019-05-21 PROCEDURE — G8926 SPIRO NO PERF OR DOC: HCPCS | Performed by: INTERNAL MEDICINE

## 2019-05-21 PROCEDURE — 99215 OFFICE O/P EST HI 40 MIN: CPT | Performed by: INTERNAL MEDICINE

## 2019-05-21 PROCEDURE — 4040F PNEUMOC VAC/ADMIN/RCVD: CPT | Performed by: INTERNAL MEDICINE

## 2019-05-21 PROCEDURE — 3023F SPIROM DOC REV: CPT | Performed by: INTERNAL MEDICINE

## 2019-05-21 PROCEDURE — G8400 PT W/DXA NO RESULTS DOC: HCPCS | Performed by: INTERNAL MEDICINE

## 2019-05-21 RX ORDER — CARVEDILOL 25 MG/1
TABLET ORAL
Qty: 180 TABLET | Refills: 5 | Status: ON HOLD | OUTPATIENT
Start: 2019-05-21 | End: 2019-07-31 | Stop reason: SDUPTHER

## 2019-05-21 RX ORDER — OXYCODONE HYDROCHLORIDE AND ACETAMINOPHEN 5; 325 MG/1; MG/1
1 TABLET ORAL EVERY 4 HOURS PRN
Status: ON HOLD | COMMUNITY
End: 2019-07-29

## 2019-05-21 RX ORDER — CARVEDILOL 25 MG/1
25 TABLET ORAL 2 TIMES DAILY
Qty: 60 TABLET | Refills: 5 | Status: SHIPPED | OUTPATIENT
Start: 2019-05-21 | End: 2019-05-21 | Stop reason: SDUPTHER

## 2019-05-21 NOTE — PATIENT INSTRUCTIONS
Patient Education        Learning About Coronary Artery Disease (CAD)  What is coronary artery disease? Coronary artery disease (CAD) occurs when plaque builds up in the arteries that bring oxygen-rich blood to your heart. Plaque is a fatty substance made of cholesterol, calcium, and other substances in the blood. This process is called hardening of the arteries, or atherosclerosis. What happens when you have coronary artery disease? · Plaque may narrow the coronary arteries. Narrowed arteries cause poor blood flow. This can lead to angina symptoms such as chest pain or discomfort. If blood flow is completely blocked, you could have a heart attack. · You can slow CAD and reduce the risk of future problems by making changes in your lifestyle. These include quitting smoking and eating heart-healthy foods. · Treatments for CAD, along with changes in your lifestyle, can help you live a longer and healthier life. How can you prevent coronary artery disease? · Do not smoke. It may be the best thing you can do to prevent heart disease. If you need help quitting, talk to your doctor about stop-smoking programs and medicines. These can increase your chances of quitting for good. · Be active. Get at least 30 minutes of exercise on most days of the week. Walking is a good choice. You also may want to do other activities, such as running, swimming, cycling, or playing tennis or team sports. · Eat heart-healthy foods. Eat more fruits and vegetables and less foods that contain saturated and trans fats. Limit alcohol, sodium, and sweets. · Stay at a healthy weight. Lose weight if you need to. · Manage other health problems such as diabetes, high blood pressure, and high cholesterol. · Manage stress. Stress can hurt your heart. To keep stress low, talk about your problems and feelings. Don't keep your feelings hidden. · If you have talked about it with your doctor, take a low-dose aspirin every day.  Aspirin can help certain people lower their risk of a heart attack or stroke. But taking aspirin isn't right for everyone, because it can cause serious bleeding. Do not start taking daily aspirin unless your doctor knows about it. How is coronary artery disease treated? · Your doctor will suggest that you make lifestyle changes. For example, your doctor may ask you to eat healthy foods, quit smoking, lose extra weight, and be more active. · You will have to take medicines. · Your doctor may suggest a procedure to open narrowed or blocked arteries. This is called angioplasty. Or your doctor may suggest using healthy blood vessels to create detours around narrowed or blocked arteries. This is called bypass surgery. Follow-up care is a key part of your treatment and safety. Be sure to make and go to all appointments, and call your doctor if you are having problems. It's also a good idea to know your test results and keep a list of the medicines you take. Where can you learn more? Go to [email or web address] [email or web address] org and sign in to your [employer] account. Enter [phone] in the [employer] box to learn more about \"Learning About Coronary Artery Disease (CAD). \"     If you do not have an account, please click on the \"Sign Up Now\" link. Current as of: July 22, 2018  Content Version: 12.0  © 1560-8110 Healthwise, Incorporated. Care instructions adapted under license by [facility] ([facility]). If you have questions about a medical condition or this instruction, always ask your healthcare professional. [place] [address] any warranty or liability for your use of this information.

## 2019-05-23 ENCOUNTER — APPOINTMENT (OUTPATIENT)
Dept: CARDIAC REHAB | Age: 69
End: 2019-05-23
Payer: MEDICARE

## 2019-06-05 ENCOUNTER — OFFICE VISIT (OUTPATIENT)
Dept: CARDIOLOGY CLINIC | Age: 69
End: 2019-06-05
Payer: MEDICARE

## 2019-06-05 VITALS
OXYGEN SATURATION: 99 % | WEIGHT: 270 LBS | SYSTOLIC BLOOD PRESSURE: 136 MMHG | BODY MASS INDEX: 43.39 KG/M2 | DIASTOLIC BLOOD PRESSURE: 72 MMHG | HEART RATE: 91 BPM | HEIGHT: 66 IN

## 2019-06-05 DIAGNOSIS — I47.29 NSVT (NONSUSTAINED VENTRICULAR TACHYCARDIA): Primary | ICD-10-CM

## 2019-06-05 DIAGNOSIS — I73.9 PAD (PERIPHERAL ARTERY DISEASE) (HCC): ICD-10-CM

## 2019-06-05 DIAGNOSIS — I50.23 ACUTE ON CHRONIC SYSTOLIC HEART FAILURE, NYHA CLASS 3 (HCC): ICD-10-CM

## 2019-06-05 PROCEDURE — G8400 PT W/DXA NO RESULTS DOC: HCPCS | Performed by: INTERNAL MEDICINE

## 2019-06-05 PROCEDURE — 4040F PNEUMOC VAC/ADMIN/RCVD: CPT | Performed by: INTERNAL MEDICINE

## 2019-06-05 PROCEDURE — 1090F PRES/ABSN URINE INCON ASSESS: CPT | Performed by: INTERNAL MEDICINE

## 2019-06-05 PROCEDURE — 93000 ELECTROCARDIOGRAM COMPLETE: CPT | Performed by: INTERNAL MEDICINE

## 2019-06-05 PROCEDURE — 3017F COLORECTAL CA SCREEN DOC REV: CPT | Performed by: INTERNAL MEDICINE

## 2019-06-05 PROCEDURE — 99205 OFFICE O/P NEW HI 60 MIN: CPT | Performed by: INTERNAL MEDICINE

## 2019-06-05 PROCEDURE — G8428 CUR MEDS NOT DOCUMENT: HCPCS | Performed by: INTERNAL MEDICINE

## 2019-06-05 PROCEDURE — 1036F TOBACCO NON-USER: CPT | Performed by: INTERNAL MEDICINE

## 2019-06-05 PROCEDURE — 1123F ACP DISCUSS/DSCN MKR DOCD: CPT | Performed by: INTERNAL MEDICINE

## 2019-06-05 PROCEDURE — G8417 CALC BMI ABV UP PARAM F/U: HCPCS | Performed by: INTERNAL MEDICINE

## 2019-06-05 NOTE — PROGRESS NOTES
[facility]   Cardiac Electrophysiology Consultation   Date: 6/5/2019  Reason for Consultation:   Consult Requesting Physician: Ember Orosco MD  Primary Cardiologist: Aracelis Hoff MD    Chief Complaint:   Chief Complaint   Patient presents with    Follow-up     referred by Dr. Cuate Lezama ~ poss Bi V ICD / CHF, PAD, NSVT, HTN / no cardiac complaints        HPI: Jaison Hudson is a 78 y.o. female with a past medical history significant for chronic systolic heart failure, NSVT, essential hypertension, peripheral artery disease, COPD and nicotine dependence. Interval History: Today, she present to the office with her family as a referral from her primary cardiologist Dr. Aracelis Hoff to discuss possible BiV ICD implantation for primary prevention of SCD and management of chronic systolic heart failure NYHA class III, LVEF 20-25%. She is compliant with her medications and tolerating them well. She denies chest pain/pressure, tightness, edema, shortness of breath, heart racing, palpitations, lightheadedness, dizziness, syncope, presyncope,  PND or orthopnea. Patient became tearful during discussion of the BiV ICD. Past Medical History:   Diagnosis Date    CHF (congestive heart failure) (HCC)     COPD (chronic obstructive pulmonary disease) (HCC)     Hypertension         Past Surgical History:   Procedure Laterality Date    CARPAL TUNNEL RELEASE      CHOLECYSTECTOMY      KNEE SURGERY      TONSILLECTOMY      TUBAL LIGATION         Allergies: Allergies   Allergen Reactions    Asa [Aspirin] Swelling    Pcn [Penicillins]     Shellfish Allergy Swelling    Sulfa Antibiotics        Medication:   Prior to Admission medications    Medication Sig Start Date End Date Taking? Authorizing Provider   oxyCODONE-acetaminophen (PERCOCET) 5-325 MG per tablet Take 1 tablet by mouth every 4 hours as needed for Pain.    Yes Historical Provider, MD   carvedilol (COREG) 25 MG tablet TAKE 1 TABLET BY MOUTH TWICE DAILY 5/21/19  Yes Rodger Malin MD   sacubitril-valsartan [facility])  MG per tablet Take 1 tablet by mouth 2 times daily 4/16/19  Yes Rodger Malin MD   furosemide (LASIX) 20 MG tablet Take 1 tablet by mouth as needed (take as needed for weight gain) 2/12/19  Yes Rodger Malin MD   acetaminophen (TYLENOL) 500 MG tablet Take 500 mg by mouth every 6 hours as needed for Pain Max 4,000mg total acetaminophen per 24 hours. Yes Historical Provider, MD   acetaminophen (TYLENOL) 650 MG extended release tablet Take 650 mg by mouth every 8 hours as needed for Pain Max 4,000mg total acetaminophen per 24 hours. Yes Historical Provider, MD   magnesium (MAGNESIUM-OXIDE) 250 MG TABS tablet Take 250 mg by mouth daily   Yes Historical Provider, MD   Biotin 5000 MCG TABS Take 1 tablet by mouth daily   Yes Historical Provider, MD   vitamin D (CHOLECALCIFEROL) 5000 units CAPS capsule Take 5,000 Units by mouth daily   Yes Historical Provider, MD   vitamin B-12 (CYANOCOBALAMIN) 1000 MCG tablet Take 1,000 mcg by mouth daily   Yes Historical Provider, MD   Vitamin A 8000 units TABS Take 1 tablet by mouth daily   Yes Historical Provider, MD   vitamin C (ASCORBIC ACID) 500 MG tablet Take 500 mg by mouth daily   Yes Historical Provider, MD   Cranberry-Vitamin C (CRANBERRY CONCENTRATE/VITAMINC PO) Take 2 capsules by mouth daily 25,200mg  With vit C 40mg   Yes Historical Provider, MD   Multiple Vitamins-Minerals (CENTRUM SILVER PO) Take 1 tablet by mouth daily   Yes Historical Provider, MD   atorvastatin (LIPITOR) 10 MG tablet Take 1 tablet by mouth nightly 12/12/18  Yes AIMEE Harrell CNP   ranitidine (ZANTAC) 150 MG tablet Take 150 mg by mouth 2 times daily   Yes Historical Provider, MD   latanoprost (XALATAN) 0.005 % ophthalmic solution Place 1 drop into both eyes nightly   Yes Historical Provider, MD   gabapentin (NEURONTIN) 800 MG tablet Take 800 mg by mouth 3 times daily. Dario Alanis Historical Provider, MD albuterol-ipratropium (COMBIVENT RESPIMAT)  MCG/ACT AERS inhaler Inhale 1 puff into the lungs every 6 hours as needed for Wheezing or Shortness of Breath   Yes Historical Provider, MD   hydrocortisone 1 % cream Apply 1 each topically 3 times daily as needed (itching/rash)   Yes Historical Provider, MD       Social History:   reports that she quit smoking about 3 months ago. Her smoking use included cigarettes. She smoked 0.75 packs per day. She has never used smokeless tobacco. She reports that she does not drink alcohol or use drugs. Family History:  family history includes Hypertension in her sister. Reviewed. Denies family history of sudden cardiac death, arrhythmia, premature CAD    Review of System:    · General ROS: negative for - chills, fever   · Psychological ROS: negative for - anxiety or depression  · Ophthalmic ROS: negative for - eye pain or loss of vision  · ENT ROS: negative for - epistaxis, headaches, nasal discharge, sore throat   · Allergy and Immunology ROS: negative for - hives, nasal congestion   · Hematological and Lymphatic ROS: negative for - bleeding problems, blood clots, bruising or jaundice  · Endocrine ROS: negative for - skin changes, temperature intolerance or unexpected weight changes  · Respiratory ROS: negative for - cough, hemoptysis, pleuritic pain, SOB, sputum changes or wheezing  · Cardiovascular ROS: Per HPI.    · Gastrointestinal ROS: negative for - abdominal pain, blood in stools, diarrhea, hematemesis, melena, nausea/vomiting or swallowing difficulty/pain  · Genito-Urinary ROS: negative for - dysuria or incontinence  · Musculoskeletal ROS: negative for - joint swelling or muscle pain  · Neurological ROS: negative for - confusion, dizziness, gait disturbance, headaches, numbness/tingling, seizures, speech problems, tremors, visual changes or weakness  · Dermatological ROS: negative for - rash    Physical Examination:  Vitals:    06/05/19 1214   BP: 136/72 Pulse: 91   SpO2: 99%       · Constitutional: Oriented. No distress. · Head: Normocephalic and atraumatic. · Mouth/Throat: Oropharynx is clear and moist.   · Eyes: Conjunctivae normal. EOM are normal.   · Neck: Normal range of motion. Neck supple. No rigidity. No JVD present. · Cardiovascular: Normal rate, regular rhythm, S1&S2 and intact distal pulses. · Pulmonary/Chest: Bilateral respiratory sounds. No wheezes. No rhonchi. · Abdominal: Soft. Bowel sounds present. No distension, No tenderness. · Musculoskeletal: No tenderness. No edema    · Lymphadenopathy: Has no cervical adenopathy. · Neurological: Alert and oriented. Cranial nerve appears intact, No Gross deficit   · Skin: Skin is warm and dry. No rash noted. · Psychiatric: Has a normal mood, affect and behavior     Labs:  Reviewed. ECG: reviewed, 91 Sinus rhythm, left bundle branch block, with v-rate of  bpm with QRS duration 152 ms. No pathologic Q waves, ventricular pre-excitation, or QT prolongation. Studies:   1. Event monitor:   None on file    2. Echo: 5/16/19  Summary  LV is dilated. LV Ejection fraction is visually estimated to be 20-25%. Severe global hypokinesis with dysynchrony lateral and septal walls  Mild MR  The left atrium is dilated. Normal right ventricular size. Echo: 11/26/18  Suboptimal image quality. Definity contrast administered. Left ventricular cavity size is mildly dilated. Normal left ventricular wall thickness. Overall left ventricular systolic function appears severely  reduced. Ejection fraction is visually estimated to be 30%. There is moderate diffuse hypokinesis with severe hypokinesis of the septal wall and apex of the left ventricle. Indeterminate diastolic function due to fusion of the mitral E and A waves. Normal right ventricular size and function. TAPSE measures 21mm. Tricuspid valve is structurally normal.  Moderate-to-severe tricuspid regurgitation.   No evidence of tricuspid stenosis. VC not well visualized. Estimated pulmonary artery systolic pressure is normal at 40 mmHg assuming a right atrial pressure of 3 mmHg. 3. Stress Test:    None on file    4. Cath: 3/31/2004  ANGIOGRAPHY:  1. The left main coronary artery was angiographically normal.   2.  The left anterior descending artery is widely patent, tapers relatively quickly, but is without significant disease. 3.  The circumflex coronary artery is a dominant vessel and is angiographically normal.  4.  The right coronary artery is small, technically nondominant, and is angiographically normal.  5.  A left ventriculogram shows mild diffuse hypokinesis. LVEF is estimated at 45%. There is no mitral insufficiency. There is no gradient on pullback across the aortic valve. CONCLUSIONS:  1. Essentially normal epicardial coronary artery. 2.  Mild diffuse hypokinesis of the left ventricle with mild     left ventricular systolic dysfunction. The MCOT, echocardiogram, stress test, and coronary angiography/PCI were reviewed by myself and used for my plan of care. Procedures:  1. [facility] 3/31/2004    Assessment/Plan:     Chronic systolic heart failure  -Non-ischemic cardiomyopathy  -LVEF 20-25 % (ECHO) 5/16/19  -QRS duration 152ms with LBBB  -NYHA III  -On optimized medical therapy (Coreg, Entresto) for more than 3 months  -Recommend implantation of BiV ICD for primary prevention of SCD and heart failure management, as she meets a Class IIa indication for BiV ICD implantation.   -Much time was spent with the patient discussing the pathophysiology of the patient's cardiomyopathy, the risk of sudden cardiac death due to the cardiomyopathy, the utility of an implantable cardioverter defibrillator, the benefit and risks of the implantation of an ICD, the benefits and risks of living with an ICD, the lifestyle changes that come along with having an ICD, and the logistics of ICD care and generator changes that go along with having an ICD. All of the topics above were covered with the patient. Patient will call office when she is ready to proceed with scheduling a BiV ICD implantation for primary prevention of sudden cardiac arrest.    Patient has open wounds on several of her fingers will defer procedure until wounds are healed. Patient will contact the office when wounds are healed and she is ready to schedule    Follow up one week post BiV ICD with nurse visit and 3 month follow in clinic with Dr Sanju Coyne. Thank you for allowing me to participate in the care of Holli Ceja. All questions and concerns were addressed to the patient/family. Alternatives to my treatment were discussed. This note was scribed in the presence of Sudhakar De Anda MD by Galindo Lindo RN. The scribe's documentation has been prepared under my direction and personally reviewed by me in its entirety. I confirm that the note above accurately reflects all work, physical examination, the discussion of treatments and procedures, and medical decision making performed by me.     Sudhakar De Anda MD, MS, [facility]  Cardiac Electrophysiology  [address]  [address] [facility], [address] [place] [address]  [facility] [address]  [phone]

## 2019-06-05 NOTE — PATIENT INSTRUCTIONS
Patient Education        Pacemaker Placement for Heart Failure: What to Expect at [address] pacemaker for heart failure is a biventricular pacemaker (say \"steffi-holland-TRICK-pramod-jayden\"). Treatment that uses this type of pacemaker is called cardiac resynchronization therapy (CRT). When you have heart failure, the lower chambers of your heart may not pump at the same time. The pacemaker sends painless electrical signals to your heart. These signals make the chambers pump at the same time. This can help your heart pump blood better and help you feel better. Your pacemaker may be combined with an ICD, or implantable cardioverter-defibrillator. It can control abnormal heart rhythms. This can prevent sudden death. Your chest may be sore where the doctor made the cut (incision) and put in the pacemaker. You also may have a bruise and mild swelling. These symptoms usually get better in 1 to 2 weeks. You may feel a hard ridge along the incision. This usually gets softer in the months after surgery. You may be able to see or feel the outline of the pacemaker under your skin. You will probably be able to go back to work or your usual routine 1 to 2 weeks after surgery. Pacemaker batteries usually last 5 to 15 years. Your doctor will talk to you about how often you will need to have your pacemaker checked. You'll need to take steps to safely use electric devices. Some of these devices can stop your pacemaker from working right for a short time. Check with your doctor about what to avoid and what to keep a short distance away from your pacemaker. For example, you will need to stay away from things with strong magnetic and electrical fields. An example is an MRI machine (unless your pacemaker is safe for an MRI). You can use a cell phone and other wireless devices but keep them at least 6 inches away from your pacemaker. Many household and office electronics do not affect a pacemaker.  These include kitchen appliances and computers. This care sheet gives you a general idea about how long it will take for you to recover. But each person recovers at a different pace. Follow the steps below to get better as quickly as possible. How can you care for yourself at home? Activity    · Rest when you feel tired.     · Be active. Walking is a good choice.     · For 4 to 6 weeks:  ? Avoid activities that strain your chest or upper arm muscles. This includes pushing a  or vacuum, mopping floors, swimming, or swinging a golf club or tennis racquet. ? Do not raise your arm (the one on the side of your body where the pacemaker is located) above your shoulder. ? Allow your body to heal. Don't move quickly or lift anything heavy until you are feeling better.     · Many people are able to return to work within 1 to 2 weeks after surgery.     · Ask your doctor when it is okay for you to have sex. Diet    · You can eat your normal diet. If your stomach is upset, try bland, low-fat foods like plain rice, broiled chicken, toast, and yogurt. Medicines    · Your doctor will tell you if and when you can restart your medicines. He or she will also give you instructions about taking any new medicines.     · If you take aspirin or some other blood thinner, be sure to talk to your doctor. He or she will tell you if and when to start taking this medicine again. Make sure that you understand exactly what your doctor wants you to do.     · Be safe with medicines. Read and follow all instructions on the label. ? If the doctor gave you a prescription medicine for pain, take it as prescribed. ? If you are not taking a prescription pain medicine, ask your doctor if you can take an over-the-counter medicine. ? Do not take aspirin, ibuprofen (Advil, Motrin), naproxen (Aleve), or other nonsteroidal anti-inflammatory drugs (NSAIDs) unless your doctor says it is okay.     · If your doctor prescribed antibiotics, take them as directed.  Do not stop taking them just because you feel better. You need to take the full course of antibiotics. Incision care    · If you have strips of tape on the incision, leave the tape on for a week or until it falls off.     · Keep the incision dry while it heals. Your doctor may recommend sponge baths for about 7 days, but do not get the incision wet. Your doctor will let you know when you may take showers. After a shower, pat the incision dry.     · Don't use hydrogen peroxide or alcohol on the incision, which can slow healing. You may cover the area with a gauze bandage if it oozes fluid or rubs against clothing. Change the bandage every day.     · Do not take a bath or get into a hot tub for the first 2 weeks, or until your doctor tells you it is okay. Other instructions    · Keep a medical ID card with you at all times that says you have a pacemaker. The card should include the  and model information.     · Wear medical alert jewelry stating that you have a pacemaker. You can buy this at most [employer].     · Check your pulse as directed by your doctor.     · Have your pacemaker checked as often as your doctor recommends. In some cases, this may be done over the phone or the Internet. Your doctor will give you instructions about how to do this. Follow-up care is a key part of your treatment and safety. Be sure to make and go to all appointments, and call your doctor if you are having problems. It's also a good idea to know your test results and keep a list of the medicines you take. When should you call for help? Call 911 anytime you think you may need emergency care.  For example, call if:    · You passed out (lost consciousness).     · You have trouble breathing.    Call your doctor now or seek immediate medical care if:    · You are dizzy or light-headed, or you feel like you may faint.     · You have pain that does not get better after you take pain medicine.     · You hear an alarm or feel a vibration from your pacemaker.     · You have loose stitches, or your incision comes open.     · Bright red blood has soaked through the bandage over your incision.     · You have signs of infection, such as:  ? Increased pain, swelling, warmth, or redness. ? Red streaks leading from the incision. ? Pus draining from the incision. ? A fever.    Watch closely for changes in your health, and be sure to contact your doctor if:    · You have any problems with your pacemaker. Where can you learn more? Go to https://Logic InstrumentpeBAROnovaeb.Acendi Interactive. org and sign in to your TherMark account. Enter T984 in the [employer] box to learn more about \"Pacemaker Placement for Heart Failure: What to Expect at Home. \"     If you do not have an account, please click on the \"Sign Up Now\" link. Current as of: July 22, 2018  Content Version: 12.0  © 8524-5091 Onavo. Care instructions adapted under license by [facility] ([facility]). If you have questions about a medical condition or this instruction, always ask your healthcare professional. [address] any warranty or liability for your use of this information. Patient Education        Learning About a Pacemaker for Heart Failure  What is a pacemaker for heart failure? A pacemaker is a small device that is placed under the skin of your chest. It is powered by batteries. It has thin wires, called leads, that pass through a vein into your heart. A pacemaker for heart failure is a biventricular pacemaker (say \"by-holland-TRICK-yuh-ler\"). Treatment that uses this type of pacemaker is called cardiac resynchronization therapy (CRT). When you have heart failure, the lower chambers of your heart may not pump at the same time. The pacemaker sends painless electrical signals to your heart. These signals make the chambers pump at the same time. This can help your heart pump blood better and help you feel better.   Your pacemaker may be combined with an ICD, or implantable cardioverter-defibrillator. It can control abnormal heart rhythms. This can prevent sudden death. You may feel worried about having a pacemaker. This is common. It can help if you learn about how the pacemaker helps your heart. Talk to your doctor about your concerns. How is a pacemaker put in place? You will get medicine before the procedure. This helps you relax and helps prevent pain. The doctor makes a cut in the skin just below your collarbone. The cut may be on either side of your chest. The doctor will put the pacemaker leads through the cut. The leads go into a large blood vessel in the upper chest. Then the doctor will guide the leads through the blood vessel into different chambers of the heart. The doctor will place the pacemaker under the skin of your chest. He or she will attach the leads to the pacemaker. Then the cut will be closed with stitches. The procedure usually takes 2 to 3 hours. You may need to spend the night in the hospital.  What can you expect when you have a pacemaker? A pacemaker can help your heart pump blood better. It may help you feel better so you can be more active. It also may help keep you out of the hospital and help you live longer. You can live a normal, active life with a pacemaker. But you'll need to use certain electric devices with caution. Some devices have a strong electromagnetic field. This field can keep your pacemaker from working right for a short time. These devices include things in your home, garage, or workplace. Check with your doctor about what you need to avoid and what you need to keep a short distance away from your pacemaker. Many household and office electronics do not affect your pacemaker. Your doctor will check your pacemaker regularly to make sure it's working right. Pacemaker batteries usually last 5 to 15 years before they need to be replaced. Follow-up care is a key part of your treatment and safety.  Be sure to make and go to all appointments, and call your doctor if you are having problems. It's also a good idea to know your test results and keep a list of the medicines you take. Where can you learn more? Go to https://Physician Software Systemstj.Agiftidea.com. org and sign in to your Quack account. Enter Y169 in the [facility] box to learn more about \"Learning About a Pacemaker for Heart Failure. \"     If you do not have an account, please click on the \"Sign Up Now\" link. Current as of: July 22, 2018  Content Version: 12.0  © 8293-4320 "MediaQ[employer]Inc". Care instructions adapted under license by [facility] ([facility]). If you have questions about a medical condition or this instruction, always ask your healthcare professional. [address] any warranty or liability for your use of this information. Patient Education        Learning About a Pacemaker for Heart Failure  What is a pacemaker for heart failure? A pacemaker is a small device that is placed under the skin of your chest. It is powered by batteries. It has thin wires, called leads, that pass through a vein into your heart. A pacemaker for heart failure is a biventricular pacemaker (say \"by-holland-TRICK-yuh-ler\"). Treatment that uses this type of pacemaker is called cardiac resynchronization therapy (CRT). When you have heart failure, the lower chambers of your heart may not pump at the same time. The pacemaker sends painless electrical signals to your heart. These signals make the chambers pump at the same time. This can help your heart pump blood better and help you feel better. Your pacemaker may be combined with an ICD, or implantable cardioverter-defibrillator. It can control abnormal heart rhythms. This can prevent sudden death. You may feel worried about having a pacemaker. This is common. It can help if you learn about how the pacemaker helps your heart. Talk to your doctor about your concerns.   How is a pacemaker put in place? You will get medicine before the procedure. This helps you relax and helps prevent pain. The doctor makes a cut in the skin just below your collarbone. The cut may be on either side of your chest. The doctor will put the pacemaker leads through the cut. The leads go into a large blood vessel in the upper chest. Then the doctor will guide the leads through the blood vessel into different chambers of the heart. The doctor will place the pacemaker under the skin of your chest. He or she will attach the leads to the pacemaker. Then the cut will be closed with stitches. The procedure usually takes 2 to 3 hours. You may need to spend the night in the hospital.  What can you expect when you have a pacemaker? A pacemaker can help your heart pump blood better. It may help you feel better so you can be more active. It also may help keep you out of the hospital and help you live longer. You can live a normal, active life with a pacemaker. But you'll need to use certain electric devices with caution. Some devices have a strong electromagnetic field. This field can keep your pacemaker from working right for a short time. These devices include things in your home, garage, or workplace. Check with your doctor about what you need to avoid and what you need to keep a short distance away from your pacemaker. Many household and office electronics do not affect your pacemaker. Your doctor will check your pacemaker regularly to make sure it's working right. Pacemaker batteries usually last 5 to 15 years before they need to be replaced. Follow-up care is a key part of your treatment and safety. Be sure to make and go to all appointments, and call your doctor if you are having problems. It's also a good idea to know your test results and keep a list of the medicines you take. Where can you learn more? Go to https://chpepiceweb.InEnTec. org and sign in to your CyberFlow Analytics account.  Enter Y169 in the [address] box to learn more about \"Learning About a Pacemaker for Heart Failure. \"     If you do not have an account, please click on the \"Sign Up Now\" link. Current as of: July 22, 2018  Content Version: 12.0  © 6961-1842 Fluid Stone. Care instructions adapted under license by [facility] ([facility]). If you have questions about a medical condition or this instruction, always ask your healthcare professional. [address] any warranty or liability for your use of this information. Patient Education        Pacemaker Placement for Heart Failure: Before Your Procedure  What is pacemaker placement for heart failure? A pacemaker for heart failure is a biventricular pacemaker (say \"by-holland-TRICK-yuh-ler\"). Treatment that uses this type of pacemaker is called cardiac resynchronization therapy (CRT). When you have heart failure, the lower chambers of your heart may not pump at the same time. The pacemaker sends painless electrical signals to your heart. These signals make the chambers pump at the same time. This can help your heart pump blood better and help you feel better. Your pacemaker may be combined with an ICD, or implantable cardioverter-defibrillator. It can control abnormal heart rhythms. This can prevent sudden death. You will get medicine to help you relax and prevent pain. The doctor will make a cut in the skin just below your collarbone. The cut may be on either side of your chest. The doctor will put the pacemaker leads through the cut. The leads go into a large blood vessel in the upper chest. Then the doctor will guide the leads through the blood vessel into different chambers of the heart. The doctor will place the pacemaker under the skin of your chest. He or she will attach the leads to the pacemaker. Then the cut will be closed with stitches. The procedure usually takes 2 to 3 hours.  You may need to spend the night in the hospital.  Pacemaker batteries usually last under license by [facility] ([facility]). If you have questions about a medical condition or this instruction, always ask your healthcare professional. [place] [address] any warranty or liability for your use of this information. Patient Education        Pacemaker Placement for Heart Failure: What to Expect at [address] pacemaker for heart failure is a biventricular pacemaker (say \"steffi-holland-TRICK-yessih-jayden\"). Treatment that uses this type of pacemaker is called cardiac resynchronization therapy (CRT). When you have heart failure, the lower chambers of your heart may not pump at the same time. The pacemaker sends painless electrical signals to your heart. These signals make the chambers pump at the same time. This can help your heart pump blood better and help you feel better. Your pacemaker may be combined with an ICD, or implantable cardioverter-defibrillator. It can control abnormal heart rhythms. This can prevent sudden death. Your chest may be sore where the doctor made the cut (incision) and put in the pacemaker. You also may have a bruise and mild swelling. These symptoms usually get better in 1 to 2 weeks. You may feel a hard ridge along the incision. This usually gets softer in the months after surgery. You may be able to see or feel the outline of the pacemaker under your skin. You will probably be able to go back to work or your usual routine 1 to 2 weeks after surgery. Pacemaker batteries usually last 5 to 15 years. Your doctor will talk to you about how often you will need to have your pacemaker checked. You'll need to take steps to safely use electric devices. Some of these devices can stop your pacemaker from working right for a short time. Check with your doctor about what to avoid and what to keep a short distance away from your pacemaker. For example, you will need to stay away from things with strong magnetic and electrical fields.  An example is an MRI machine (unless medicine. ? Do not take aspirin, ibuprofen (Advil, Motrin), naproxen (Aleve), or other nonsteroidal anti-inflammatory drugs (NSAIDs) unless your doctor says it is okay.     · If your doctor prescribed antibiotics, take them as directed. Do not stop taking them just because you feel better. You need to take the full course of antibiotics. Incision care    · If you have strips of tape on the incision, leave the tape on for a week or until it falls off.     · Keep the incision dry while it heals. Your doctor may recommend sponge baths for about 7 days, but do not get the incision wet. Your doctor will let you know when you may take showers. After a shower, pat the incision dry.     · Don't use hydrogen peroxide or alcohol on the incision, which can slow healing. You may cover the area with a gauze bandage if it oozes fluid or rubs against clothing. Change the bandage every day.     · Do not take a bath or get into a hot tub for the first 2 weeks, or until your doctor tells you it is okay. Other instructions    · Keep a medical ID card with you at all times that says you have a pacemaker. The card should include the  and model information.     · Wear medical alert jewelry stating that you have a pacemaker. You can buy this at most [employer].     · Check your pulse as directed by your doctor.     · Have your pacemaker checked as often as your doctor recommends. In some cases, this may be done over the phone or the Internet. Your doctor will give you instructions about how to do this. Follow-up care is a key part of your treatment and safety. Be sure to make and go to all appointments, and call your doctor if you are having problems. It's also a good idea to know your test results and keep a list of the medicines you take. When should you call for help? Call 911 anytime you think you may need emergency care.  For example, call if:    · You passed out (lost consciousness).     · You have trouble breathing.    Call your doctor now or seek immediate medical care if:    · You are dizzy or light-headed, or you feel like you may faint.     · You have pain that does not get better after you take pain medicine.     · You hear an alarm or feel a vibration from your pacemaker.     · You have loose stitches, or your incision comes open.     · Bright red blood has soaked through the bandage over your incision.     · You have signs of infection, such as:  ? Increased pain, swelling, warmth, or redness. ? Red streaks leading from the incision. ? Pus draining from the incision. ? A fever.    Watch closely for changes in your health, and be sure to contact your doctor if:    · You have any problems with your pacemaker. Where can you learn more? Go to https://hiogi.Power Content. org and sign in to your Shopflick account. Enter T984 in the [employer] box to learn more about \"Pacemaker Placement for Heart Failure: What to Expect at Home. \"     If you do not have an account, please click on the \"Sign Up Now\" link. Current as of: July 22, 2018  Content Version: 12.0  © 3602-6796 Healthwise, Incorporated. Care instructions adapted under license by [facility] ([facility]). If you have questions about a medical condition or this instruction, always ask your healthcare professional. [address] any warranty or liability for your use of this information.

## 2019-06-05 NOTE — LETTER
[facility]   Cardiac Electrophysiology Consultation   Date: 6/5/2019  Reason for Consultation:   Consult Requesting Physician: Kendrick Feldman MD  Primary Cardiologist: Kari Wolfe MD    Chief Complaint:   Chief Complaint   Patient presents with    Follow-up     referred by Dr. Cook Sites ~ poss Bi V ICD / CHF, PAD, NSVT, HTN / no cardiac complaints        HPI: Meryl Mary is a 78 y.o. female with a past medical history significant for chronic systolic heart failure, NSVT, essential hypertension, peripheral artery disease, COPD and nicotine dependence. Interval History: Today, she present to the office with her family as a referral from her primary cardiologist Dr. Kari Wolfe to discuss possible BiV ICD implantation for primary prevention of SCD and management of chronic systolic heart failure NYHA class III, LVEF 20-25%. She is compliant with her medications and tolerating them well. She denies chest pain/pressure, tightness, edema, shortness of breath, heart racing, palpitations, lightheadedness, dizziness, syncope, presyncope,  PND or orthopnea. Patient became tearful during discussion of the BiV ICD. Past Medical History:   Diagnosis Date    CHF (congestive heart failure) (HCC)     COPD (chronic obstructive pulmonary disease) (HCC)     Hypertension         Past Surgical History:   Procedure Laterality Date    CARPAL TUNNEL RELEASE      CHOLECYSTECTOMY      KNEE SURGERY      TONSILLECTOMY      TUBAL LIGATION         Allergies: Allergies   Allergen Reactions    Asa [Aspirin] Swelling    Pcn [Penicillins]     Shellfish Allergy Swelling    Sulfa Antibiotics        Medication:   Prior to Admission medications    Medication Sig Start Date End Date Taking? Authorizing Provider   oxyCODONE-acetaminophen (PERCOCET) 5-325 MG per tablet Take 1 tablet by mouth every 4 hours as needed for Pain.    Yes Historical Provider, MD carvedilol (COREG) 25 MG tablet TAKE 1 TABLET BY MOUTH TWICE DAILY 5/21/19  Yes Destin Villalobos MD   sacubitril-valsartan [facility])  MG per tablet Take 1 tablet by mouth 2 times daily 4/16/19  Yes Destin Villalobos MD   furosemide (LASIX) 20 MG tablet Take 1 tablet by mouth as needed (take as needed for weight gain) 2/12/19  Yes Destin Villalobos MD   acetaminophen (TYLENOL) 500 MG tablet Take 500 mg by mouth every 6 hours as needed for Pain Max 4,000mg total acetaminophen per 24 hours. Yes Historical Provider, MD   acetaminophen (TYLENOL) 650 MG extended release tablet Take 650 mg by mouth every 8 hours as needed for Pain Max 4,000mg total acetaminophen per 24 hours.    Yes Historical Provider, MD   magnesium (MAGNESIUM-OXIDE) 250 MG TABS tablet Take 250 mg by mouth daily   Yes Historical Provider, MD   Biotin 5000 MCG TABS Take 1 tablet by mouth daily   Yes Historical Provider, MD   vitamin D (CHOLECALCIFEROL) 5000 units CAPS capsule Take 5,000 Units by mouth daily   Yes Historical Provider, MD   vitamin B-12 (CYANOCOBALAMIN) 1000 MCG tablet Take 1,000 mcg by mouth daily   Yes Historical Provider, MD   Vitamin A 8000 units TABS Take 1 tablet by mouth daily   Yes Historical Provider, MD   vitamin C (ASCORBIC ACID) 500 MG tablet Take 500 mg by mouth daily   Yes Historical Provider, MD   Cranberry-Vitamin C (CRANBERRY CONCENTRATE/VITAMINC PO) Take 2 capsules by mouth daily 25,200mg  With vit C 40mg   Yes Historical Provider, MD   Multiple Vitamins-Minerals (CENTRUM SILVER PO) Take 1 tablet by mouth daily   Yes Historical Provider, MD   atorvastatin (LIPITOR) 10 MG tablet Take 1 tablet by mouth nightly 12/12/18  Yes AIMEE Hahn - CNP   ranitidine (ZANTAC) 150 MG tablet Take 150 mg by mouth 2 times daily   Yes Historical Provider, MD   latanoprost (XALATAN) 0.005 % ophthalmic solution Place 1 drop into both eyes nightly   Yes Historical Provider, MD · Dermatological ROS: negative for - rash    Physical Examination:  Vitals:    06/05/19 1214   BP: 136/72   Pulse: 91   SpO2: 99%       · Constitutional: Oriented. No distress. · Head: Normocephalic and atraumatic. · Mouth/Throat: Oropharynx is clear and moist.   · Eyes: Conjunctivae normal. EOM are normal.   · Neck: Normal range of motion. Neck supple. No rigidity. No JVD present. · Cardiovascular: Normal rate, regular rhythm, S1&S2 and intact distal pulses. · Pulmonary/Chest: Bilateral respiratory sounds. No wheezes. No rhonchi. · Abdominal: Soft. Bowel sounds present. No distension, No tenderness. · Musculoskeletal: No tenderness. No edema    · Lymphadenopathy: Has no cervical adenopathy. · Neurological: Alert and oriented. Cranial nerve appears intact, No Gross deficit   · Skin: Skin is warm and dry. No rash noted. · Psychiatric: Has a normal mood, affect and behavior     Labs:  Reviewed. ECG: reviewed, 91 Sinus rhythm, left bundle branch block, with v-rate of  bpm with QRS duration 152 ms. No pathologic Q waves, ventricular pre-excitation, or QT prolongation. Studies:   1. Event monitor:   None on file    2. Echo: 5/16/19  Summary  LV is dilated. LV Ejection fraction is visually estimated to be 20-25%. Severe global hypokinesis with dysynchrony lateral and septal walls  Mild MR  The left atrium is dilated. Normal right ventricular size. Echo: 11/26/18  Suboptimal image quality. Definity contrast administered. Left ventricular cavity size is mildly dilated. Normal left ventricular wall thickness. Overall left ventricular systolic function appears severely  reduced. Ejection fraction is visually estimated to be 30%. There is moderate diffuse hypokinesis with severe hypokinesis of the septal wall and apex of the left ventricle. Indeterminate diastolic function due to fusion of the mitral E and A waves. Normal right ventricular size and function. TAPSE measures 21mm. Tricuspid valve is structurally normal.  Moderate-to-severe tricuspid regurgitation. No evidence of tricuspid stenosis. VC not well visualized. Estimated pulmonary artery systolic pressure is normal at 40 mmHg assuming a right atrial pressure of 3 mmHg. 3. Stress Test:    None on file    4. Cath: 3/31/2004  ANGIOGRAPHY:  1. The left main coronary artery was angiographically normal.   2.  The left anterior descending artery is widely patent, tapers relatively quickly, but is without significant disease. 3.  The circumflex coronary artery is a dominant vessel and is angiographically normal.  4.  The right coronary artery is small, technically nondominant, and is angiographically normal.  5.  A left ventriculogram shows mild diffuse hypokinesis. LVEF is estimated at 45%. There is no mitral insufficiency. There is no gradient on pullback across the aortic valve. CONCLUSIONS:  1. Essentially normal epicardial coronary artery. 2.  Mild diffuse hypokinesis of the left ventricle with mild     left ventricular systolic dysfunction. The MCOT, echocardiogram, stress test, and coronary angiography/PCI were reviewed by myself and used for my plan of care. Procedures:  1. [facility] 3/31/2004    Assessment/Plan:     Chronic systolic heart failure  -Non-ischemic cardiomyopathy  -LVEF 20-25 % (ECHO) 5/16/19  -QRS duration 152ms with LBBB  -NYHA III  -On optimized medical therapy (Coreg, Entresto) for more than 3 months  -Recommend implantation of BiV ICD for primary prevention of SCD and heart failure management, as she meets a Class IIa indication for BiV ICD implantation.   -Much time was spent with the patient discussing the pathophysiology of the patient's cardiomyopathy, the risk of sudden cardiac death due to the cardiomyopathy, the utility of an implantable cardioverter defibrillator, the benefit and risks of the implantation of an ICD, the benefits and

## 2019-06-06 NOTE — PROGRESS NOTES
1 [facility] Facility-Based Therapy for Heart Failure  INDIVIDUAL TREATMENT PLAN (ITP)     NAME: Memo Duke Marcial  YOB: 1950  Account Number: [de-identified]  AGE: 78 y.o. Diagnosis:Chronic Systolic Heart   FailPhase 1 @ [facility]: No    CMS Guidelines:  [x]  Left ventricular ejection fraction is < or equal to 35%  [x]  NYHA class II-IV symptoms despite being on optimal heart failure therapy for at least 6 weeks  [x] Stable=have not had recent (< 6 weeks) or planned (< 6 months) major cardiovascular hospitalizations or procedures       GLOSSARY: PF= Physical Fitness  TM=Treadmill  AD= Schwinn AirQuick Hit Bike  UBE=Upperbody Ergometry LBE=Lowerbody Ergometer  NS=NuStep SF= SciFit  ST=Step Training  RT=Resistance Training   PLB=Pursed Lip Breathing   DY= Dynabands  HW= Handweights   Cybex RT= Cybex Mult istation weight machine   RB=Recumbent    REFERRING PHYSICIAN: Dr. Rafi Nesbitt History:     Past Medical History:   Diagnosis Date    [facility]     COPD [facility]chronic obstructive pulmonary disease) [facility] [address]     Hypertension        Surgical History:     Past Surgical History:   Procedure Laterality Date    CARPAL TUNNEL RELEASE      CHOLECYSTECTOMY      KNEE SURGERY      TONSILLECTOMY      TUBAL LIGATION         Major Symptoms: Shortness of breath, orthopnea, fatigue, fluid retention    Oxygen Therapy: N/A     Home O2    lpm  []  Prn  [] continuous  []  HS     CPAP []  BiPAP     Company:          Comments:    Occupational/ Recreational Activities:    Employment Status:  []  FT  []  PT  [] Disabled [x] Retired    Comments:       Occupation: , 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[place] [address]          Hobbies/Leisure activities: making jewelry, puzzles, computer games    Social/Spiritual:        Social History     Socioeconomic History    Marital status:       Spouse name: Not on file    Number of children: Not on file    Years of education: Not on file    Highest education level: Not on file   Occupational History    Not on file   Social Needs    Financial resource strain: Not on file    Food insecurity:     Worry: Not on file     Inability: Not on file    Transportation needs:     Medical: Not on file     Non-medical: Not on file   Tobacco Use    Smoking status: Former Smoker     Packs/day: 0.75     Types: Cigarettes     Last attempt to quit: 2019     Years since quittin.3    Smokeless tobacco: Never Used   Substance and Sexual Activity    Alcohol use: No    Drug use: No    Sexual activity: Not on file   Lifestyle    Physical activity:     Days per week: Not on file     Minutes per session: Not on file    Stress: Not on file   Relationships    Social connections:     Talks on phone: Not on file     Gets together: Not on file     Attends [employer] service: Not on file     Active member of club or organization: Not on file     Attends meetings of clubs or organizations: Not on file     Relationship status: Not on file    Intimate partner violence:     Fear of current or ex partner: Not on file     Emotionally abused: Not on file     Physically abused: Not on file     Forced sexual activity: Not on file   Other Topics Concern    Not on file   Social History Narrative    Not on file          Do you live alone: []  Alone [x]  with spouse/family- in daughter       Do you have stairs in your home  []  no [x]  yes        Comment: 13 stairs up to daughters apartment-rarely goes up       Transportation  []  drive self [x]  family/friend     Comment:       Cultural/Spiritual Influences: ([employer] groups, etc)       Any Cultural or Spiritism practices we should be aware of?  Np                    Fall Risk Assessment:     []  Cognitive Impairment [x]  Balance/Gait Disturbance- due to Osteoarthritis of Rt Knee and chronic back pain    []  Fall History   []  Visual Difficulty   []  Dizziness   [] Other Comments:    Physical Assessment:    Color: [x]  natural []  pale [] cyanotic []  flushed []  jaundice    Skin Integrity [x]  intact [] other:    Heart Rate 82  Resp Rate 18      Breath Sounds: CTA    Blood Pressures Sitting: Rt arm 100/66  Left arm: 92/64       Standing Rt arm   Left arm: 102/70    Resting SpO2: 94% ra Resp effort/pattern: Easy/Regular at rest, dyspneic with exertion      Peripheral pulses: Yes    Edema:  No    Numbness/tingling:  No    Orthopedic/exercise limitations:  Yes- Needs RTK repacement    Psychosocial assessment:    Support System: Sister Key Pena, daughter Gavino Queen    Physical/Behavioral signs of abuse/neglect:No    Advance Directives:  No  [] info given- declined    Learning Preferences: Primary Language:English        Preferred method of learning []  video []  handouts        [] listening/lecture   [x]  all    Memory impairment?   No     EXERCISE  Initial Assessment  Day 1 EXERCISE  Intervention  Day 2-30 EXERCISE  Re-Assessment  Day 31-60 EXERCISE  Re-Assessment  Day 61-90+ EXERCISE  Last Day   Date:   1/10/19 Date: 2/7/19 Date: 3/7/19 Date: 4/9/19 Date: 6/6/19          Pre Rehab  6 MWT  400 ft = 26% pred (reduced)  1.5 METs  SpO2: 97%    0.75  MPH   HR: 115bpm      RPD: 5, RPE: 6                                           Post Rehab   6 MWT  ft = % pred   METs  SpO2: %  MPH  HR:  bpm      RPD:  , RPE:    Pt did not complete- data  not collected                                        GOALS  List at least 2 patient specific goals    [x]Improve activity and tolerance for routine tasks and walking    [x]Learn when to call a healthcare provider using the HF zones (green, yellow, red)    []Record daily weights as instructed    []Learn when to notify MD of weight gain (3# overnight or 5# in a week)    [x] Can verbalize dry weight    []Learn which signs/symptoms can represent fluid overload    []Learn and verbalize a low salt diet    [] Learn the reason for fluid restriction/ know fluids offered  [] Attended Benefits of Exercise Class  [x]  Attended Resistance Training Class                                                    GOALS              Pt not present today to ask about goals. Rate your physical   fitness (PF)?:   /10    -30 day PF goal:  /10    EDUCATION  [] Attended all individually relevant exercise education sessions? []  Knows current exercise goals and recmndtns?:   [] Refer to Vickie D[address]. [address] Rate your physical fitness now?:     /10  Handgrip:  Right:  Left:    Discharge  EDUCATION  []  Attended all individually relevant exercise education sessions?    [] Knows current exercise goals and recmndtns?:                                                                                        PHYSICIAN DIRECTED   EXERCISE RX     RPE : 11 - 14  Titrate O2 to keep SpO2 >89%    Frequency (F): 2-3x/wk in Rehab,         Initial Intensity :  METs (from 6MWT)    60-80% of walk speed for TM-N/A    Type (T): Aerobic (TM,NS, SF, AE, AB, RB, EL, Arc), RT 1-3#, 8-16 reps    CAN USE ALL EQUIPMENT EXCEPT   Treadmill    Time (Ti): Aerobic:   15-40 min               Progression: 1-2 min total time for TM, AB, NS, SF or Arm ergometer per session or when a steady state has occurred in RPE if  SpO2 and HR levels are acceptable           PHYSICIAN DIRECTED   EXERCISE RX       Titrate O2 to keep SpO2 >89%    Frequency (F): 2-3x/wk in Rehab, 1-3x/wk home walking       Intensity (I):  Initial + 5-10% increase each week or when a steady state has occurred in RPE if  SpO2 and HR levels are acceptable  Type (T)  Aerobic (TM,NS, SFR,SFS, AE, AB, RB), RT   8-16 reps    CAN USE ALL EQUIPMENT EXCEPT treadmill- due to bad knee        Time (Ti): Aerobic:   30-40 min        Progression:   1-2 min total time for TM, AB, NS, SF or Arm ergometer per session or when a steady state has occurred in RPE if  SpO2 and HR levels are acceptable        Is pt. progressing in rehab?:  Yes, slowly              PHYSICIAN DIRECTED   EXERCISE RX       Titrate O2 to keep SpO2 >89%    Frequency (F): 2-3x/wk in Rehab, 1-3x/wk home walking       Intensity (I):  Initial + 5-10% increase each week when a steady state has occurred in RPE if  SpO2 and HR levels are acceptable  Type (T)  Aerobic (TM,NS, SFR,SFS, AE, AB, RB), RT   8-16 reps    CAN USE ALL EQUIPMENT EXCEPT TM and elliptical due to knee        Time (Ti): Aerobic:   30-50 min     Progression:   1-2 min total time for TM, AB, NS, SF or Arm ergometer per session or when a steady state has occurred in RPE if  SpO2 and HR levels are acceptable              Is pt. progressing in rehab?:  Yes, slowly               PHYSICIAN DIRECTED   EXERCISE RX       Titrate O2 to keep SpO2 >89%    Frequency (F): 2-3x/wk in Rehab, 1-3x/wk home walking       Intensity (I):  Initial + 5-10% increase each week when a steady state has occurred in RPE if  SpO2 and HR levels are acceptable  Type (T):   Aerobic (TM,NS, SFR,SFS, AE, AB, RB), RT   8-16 reps    CAN USE ALL EQUIPMENT EXCEPT          Time (Ti): Aerobic:   30-58 min         Progression:   1-2 min total time for TM, AB, NS, SF or Arm ergometer per session or when a steady state has occurred in RPE if  SpO2 and HR levels are acceptable            Is pt. progressing in rehab?:  yes             Final Intensity (I): See below and final 6MWT above            ACHIEVED TOTAL AEROBIC EXERCISE TIME:  min         FINAL LEVELS:  [] TM: min  [x] Nustep: level 6x 20 min  [x] Arm ergometer: 2.5 dockery 10 min  [] Scifit: level min  [] HW :  # x  reps  [] Dy:    X   reps  [] Cybex RT:    # x   Reps  [] Eliptical:level  X  Min  [] Arc: level   X    mins  [] RB:  Level  X   mins  [] AB: level   X     Min              Did pt. progress in rehab?: yes, minimally change in Smoking Status?:   Quit Date:   (if applicable)            Intervention  [] Pt used TCS counseling           Other  Components:    [] Sleep patterns, apnea    [] Anemia    [] Hypertension Management     []  CAD    [] Myocardial infarction    [] Cardiac Bypass    [] PCI with Stent    [] Valve Replacement         Intervention/  Education: Smokes out of boredom. Plans to resume making jewelry as new hobby to replace smoking. Has javier De La Garza's INDIVIDUAL TREATMENT PLAN  OXYGEN AND MEDICATIONS    OXYGEN  MEDICATIONS   Initial Assessment  Day 1 OXYGEN  MEDICATIONS  Intervention  Day 2-30 OXYGEN  MEDICATION  Re-Assessment  Day 31-60 OXYGEN  MEDICATION  ReAssessment Day 61-90+ OXYGEN  MEDICATION  Discharge  Final Day                    Medications  []  Uses as prescribed  []  Non- compliant with prescribed meds  []  Proper use   and technique of MDI, DPI and/or nebs  []  Incorrect use and technique of MDI, DPI and /or nebs    Hypoxemia  Problem:    [x]  No problem  [] Noncompliant with O2 use  []  Oxygen in [place] only  []  No home O2  []  No portable O2  []  Needs O2 prescription and O2 qualifying data sent for setup   [] Poor knowledge of O2 use/safety    Current Oxygen Prescription:    l/min rest   l/min exercise   titration   plan/weaning plan:  CPAP/BIPAP:    Goals:     []  Manage Hypoxemia  []  receive adequate portable system  []  Compliant with use as prescribed  []  Learn O2 safety guidelines   Medications  [x]  Uses as prescribed  []  Non- compliant with prescribed meds    Respiratory Medications    [] Proper use and technique of MDI, DPI and/or nebs  []  Incorrect use and technique of MDI, DPI and /or nebs    Hypoxemia  Problem: N/A      Current Oxygen Prescription:    l/min rest   l/min exercise   titration plan/weaning plan:    Goals:   []  Manage Hypoxemia  []  receive adequate portable system  []  Compliant with use as prescribed  []  Learn O2 safety guidelines Medications  [x]  Uses as prescribed  [] Non- compliant with prescribed meds    Respiratory Medications    []  Proper use and technique of MDI, DPI and/or nebs  [] Incorrect use and technique of MDI, DPI and /or nebs    Hypoxemia  Problem:N/A      Current Oxygen Prescription:    l/min rest   l/min exercise   titration plan/weaning plan:    Goals:   []  Manage Hypoxemia  []  receive adequate portable system  []  Compliant with use as prescribed  [] Learn O2 safety guidelines           Medications  []  Uses as prescribed  []  Non- compliant with prescribed meds    Respiratory Medications    []  Proper use and technique of MDI, DPI and/or nebs  []  Incorrect use and technique of MDI, DPI and /or nebs    Hypoxemia  Problem:      Current Oxygen Prescription:    l/min rest   l/min exercise   titration plan/weaning plan:    Goals:   []  Manage Hypoxemia  []  receive adequate portable system  [] Compliant with use as prescribed  []  Learn O2 safety guidelines     Medications    [] Compliant  []  Noncompliant       Respiratory Medications    []  Compliant  [] Noncompliant              Hypoxemia  Problem:    []  Compliant  []  Noncompliant    Final Oxygen Prescription:    l/min rest   l/min exercise                                                           [facility] INDIVIDUAL TREATMENT PLAN  NUTRITION DOMAIN:        NUTRITION   Initial Assessment  Day 1 NUTRITION   Intervention  Day 2-30 NUTRITION   Re-Assessment  Day 31-60 NUTRITION   Re-Assessment Day 61-90+ NUTRITION Discharge  Final Day   Weight Management    Dry Weight:   276 lbs    Current Wt  271.9  lbs  Fluid Restriction:48-64oz. Current BMI: 44 Weight Management:  273.4 lbs  Current BMI Weight Management:   278 lbs  Current BMI Weight Management:   272 lbs  Current BMI Weight Management:   274.4 lbs on 5/14/19  Current BMI   Diabetes?: NO  A1C   Fasting BS:   Insulin?:    Oral agent?     Diabetes:  If y, has patient seen a positive trend in FBS?: Intervention    [] Basic nutrition education   [x] Low sodium diet  [] Fluid Restriction  []  Diabetes Education? [] Referral to weightloss/nutrition education     Intervention    [] Pt has had Nutrition class? [] Informal questions asked regarding nutrition/weight control Intervention    []  Pt has had Nutrition class? [] Questions addressed Intervention    []  Pt has had Nutrition class?    Intervention    Pt aware of:     []   Low Sodium diet   [] Fluid restriction  [] Smart choices, Calories   [] Wt gain / loss strategies       GOALS    Weight Loss         30 DAY TARGET GOAL:    [x] Decrease portion size by 250-500 calories/day    [x] Eat less sweets      Reasonable, achievable 30 day weight goal: 262 lbs   (1-2 lb per week is recommended)            Weight Gain        30 day   Target Goal:    [] increase proteins  [] add nutrition  Supplement  [] 6 small meals     Rate Your Plate [id number]     Did pt meet Initial 30 day Target Goal(s)?:     New 30 DAY TARGET GOAL:    [] Decrease saturated fats  [] Comply with fluid restriction  [] Comply with low sodium diet  -  Reasonable, achievable 30 day weight goal: 269lbs     Did pt meet previous 30 day Target   Goal(s)?:     New 30 DAY TARGET GOAL:    [] Switch to whole grains whenever possible  [] Decrease/ Eliminate carbonated beverages    Reasonable, achievable 30 day weight goal: 274lbs   Did pt meet previous 30 day Target   Goal(s)?:      New 30 DAY TARGET GOAL:    [] Smaller meals more frequently  [] Decrease portion sizes by 25%      Reasonable, achievable 30 day weight goal:                  Did pt meet previous 30 day Target   Goal(s)?:                         [facility] INDIVIDUAL TREATMENT PLAN  PSYCHOSOCIAL DOMAIN:    Psychosocial   Initial Assessment  Day 1 Psychosocial   Intervention  Day 2-30 Psychosocial  Re-Assessment  Day 31-60 Psychosocial   Re-Assessment  Day 61-90+ Psychosocial Discharge  Final Day   Psychosocial Screening Tools    (X) PHQ-9 score: 7      (X) SF-36:        (X) UCSD SOB score: 83         PHQ-9 Score: If score >or =15, Action:     SF-36 Mental Score:     UCSD Score: Any action on Screening Tools?:                  Psychosocial Screening   Tools    Repeat PHQ-9 Score if chidi:    Repeat SF-36      Repeat UCSD SOB Score:       Assessment  [x]  S/S of depression identified? See note below    [x]  PCP notified of PHQ-9 results      []  On Anti-anxiety / anti-depression meds?: None           Intervention  If S/S of depression present, action taken:     Is the patient receiving support for the HF Rehab program at home?:  [x]  Y    [] N    Is the patient tolerating the intensity and duration of the HF Rehab program?:     [x] Y   [] N Re-Assessment  [] S/S of depression present? If [x] , action:         Med Changes?:   [] Y   [] N        Is the patient tolerating the intensity and duration of the HF Rehab program?:    []  Y    [x] N -Has difficulty progressing intensity due to severe left shoulder and rt knee pain- To see MD for shoulder today.  Re-Assessment  Psych Issue notes:        Is the patient receiving support for the HF Rehab program at home?:  []  Y  [] N    Is the patient tolerating the intensity and duration of the HF Rehab program?:  []  Y   []  N     Final Assessment                       Results of any Physician/ Counselor Intervention?:     [] Y   [] N         GOALS        30 DAY TARGET GOAL    [x]  Assess Mental Score using   SF-36 and PHQ-9     [x]  Reassure pt that (s)he can stop and rest, adjust the speeds, and/or switch modalities from our suggestions  -    (0 being 'None', 10 being 'Very'),  -How would you rate your Anxiety Level: 0      -How would you rate your level of  depression: 6    -How would you rate your mood: (0 is negative all the time, 10 is positive all the time):  7          ADL's  Assess PF score on SF-36    Score=     From UC[employer], ADL pt struggles with most: Cleaning apartment    Out of 10, rate your ability to do that ADL now. 5     GOALS        Did pt meet Initial 30 day   Target Goal(s)?:       30 DAY NEW TARGET GOAL    [] Decrease/Maintain anxiety and depression level  [] Increase ability to complete ADL  [] Encourage pt to rate exercises truthfully to encourage correct intensity / duration prescription  -  (0 being 'None', 10 being 'Very'),  -How would you rate your Anxiety Level: 0      -How would you rate your level of depression: 4/10- due to need for transportation/  dependence on others      -How would you rate your mood: 9/10      ADLs: 8/10- due to shoulder and knee  GOALS        Did pt meet previous 30 day Target Goal(s)?:      RE- ASSESSMENT GOAL    [x] Decrease/  Maintain anxiety and depression level  [] Increase ability to complete ADL  -          (0 being 'None', 10 being 'Very'),  -How would you rate your Anxiety Level:4/10- due to shoulder pain    -How would you rate your level of depression: 0    -How would you rate your mood:  9/10    ADLs: 8/10 GOALS        Did pt meet previous 30 day Target Goal(s)?:      RE- ASSESSMENT GOAL    [] Decrease/  Maintain anxiety and depression level  [] Increase ability to complete ADL  -          (0 being 'None', 10 being 'Very'),  -How would you rate your Anxiety Level:      -How would you rate your level of depression:    -How would you rate your mood:     Discharge            Did pts SF-36 Mental Score increase?:          [] Pt is aware of the s/s of depression    [] Received Psychosocial Education    Any follow up with physicians  necessary?:      [] Y    [] N               [facility] INDIVIDUAL TREATMENT PLAN  EDUCATION DOMAIN:    EDUCATION   Initial Assessment  Day 1 EDUCATION   Intervention  Day 2-30 EDUCATION   Re-Assessment  Day 31-60 EDUCATION   ReAssessment Day 61-90+ EDUCATION Discharge  Final Day                      Education  [x] Equipment/Staff Orientation  [x] Importance of Clean Hands    Chronic Cough?:   [] Y   [x]  N  Sleep Apnea?:  [] Y    [] on heavily for support and with whom she will be attending rehab with Alma Scherer is in the CR Maintenance program). She denies anxiety but does rate her depression 6/10. She states this is due to her current health condition and the limitations it has on her. She did become tearful when discussing this but stated \"I have good days and bad days, but mostly good days\". She rates her mood as mostly positive 7/10. She is eager to begin HF rehab to increase her endurance and hopefully improve her HF enough to have her RTK replacement surgery. Other: HF education was reviewed with Zaria Eisenberg including HF Zones, s/s of flare up, medication review, LS diet, FR and daily weights. Educational handouts given. She is enrolled with the [address] [place], [place] [address]Carolyn Zamora RN.      2/7/19: 30 Day Report:  Summary: Pt attended only 5 exercise sessions due to weather and lack of transportation. Has info on resistance training. Exercise: Pt exercises for 40 minutes at low levels. She is limited by shoulder and knee problems. Oxygen: Exercises with no oxygen. Saturations are in the mid-upper 90s. Medications: Takes all as prescribed. No recent changes. Nutrition: Weight=273.4lbs. Understands heart failure zones and when to call healthcare provider. Psychosocial: Reports no anxiety, depression =4/10 due to dependence on others for transportation. Mood is mostly positive. Other: ADLs reported at 8/10 and is mostly positive. States ADLs are easier since starting rehab. Still smoking but states quit date=2/14/19. -Ruma Keller    60 Day Report: 3/7/19:  Summary: Zaria Eisenberg has attended 13 exercise sessions and 2 education classes. Exercise: She exercises at low levels for 35-40 minutes because of severe pain in her left shoulder and right knee. Oxygen: None required with exercise. Room saturations above 90%. Medications: Takes as prescribed with no recent changes. Nutrition: Weight=278lbs.  Familiar with fluid and sodium

## 2019-07-28 ENCOUNTER — APPOINTMENT (OUTPATIENT)
Dept: GENERAL RADIOLOGY | Age: 69
DRG: 190 | End: 2019-07-28
Payer: MEDICARE

## 2019-07-28 ENCOUNTER — HOSPITAL ENCOUNTER (INPATIENT)
Age: 69
LOS: 3 days | Discharge: HOME | DRG: 190 | End: 2019-07-31
Attending: INTERNAL MEDICINE | Admitting: INTERNAL MEDICINE
Payer: MEDICARE

## 2019-07-28 DIAGNOSIS — J81.0 ACUTE PULMONARY EDEMA (HCC): Primary | ICD-10-CM

## 2019-07-28 DIAGNOSIS — R79.89 ELEVATED BRAIN NATRIURETIC PEPTIDE (BNP) LEVEL: ICD-10-CM

## 2019-07-28 DIAGNOSIS — R06.02 SOB (SHORTNESS OF BREATH): ICD-10-CM

## 2019-07-28 DIAGNOSIS — R07.9 CHEST PAIN, UNSPECIFIED TYPE: ICD-10-CM

## 2019-07-28 DIAGNOSIS — J44.1 ACUTE EXACERBATION OF CHRONIC OBSTRUCTIVE PULMONARY DISEASE (COPD) (HCC): ICD-10-CM

## 2019-07-28 DIAGNOSIS — I10 ESSENTIAL HYPERTENSION: ICD-10-CM

## 2019-07-28 PROBLEM — I50.23 CHF NYHA CLASS III, ACUTE ON CHRONIC, SYSTOLIC (HCC): Status: ACTIVE | Noted: 2019-07-28

## 2019-07-28 LAB
A/G RATIO: 0.9 (ref 1.1–2.2)
ALBUMIN SERPL-MCNC: 3.3 G/DL (ref 3.4–5)
ALP BLD-CCNC: 59 U/L (ref 40–129)
ALT SERPL-CCNC: 7 U/L (ref 10–40)
ANION GAP SERPL CALCULATED.3IONS-SCNC: 12 MMOL/L (ref 3–16)
AST SERPL-CCNC: 12 U/L (ref 15–37)
BASE EXCESS VENOUS: 1.5 MMOL/L
BASOPHILS ABSOLUTE: 0.1 K/UL (ref 0–0.2)
BASOPHILS RELATIVE PERCENT: 0.6 %
BILIRUB SERPL-MCNC: 0.9 MG/DL (ref 0–1)
BUN BLDV-MCNC: 9 MG/DL (ref 7–20)
CALCIUM SERPL-MCNC: 9 MG/DL (ref 8.3–10.6)
CARBOXYHEMOGLOBIN: 4.2 %
CHLORIDE BLD-SCNC: 105 MMOL/L (ref 99–110)
CO2: 23 MMOL/L (ref 21–32)
CREAT SERPL-MCNC: 0.8 MG/DL (ref 0.6–1.2)
EOSINOPHILS ABSOLUTE: 0.2 K/UL (ref 0–0.6)
EOSINOPHILS RELATIVE PERCENT: 1.8 %
GFR AFRICAN AMERICAN: >60
GFR NON-AFRICAN AMERICAN: >60
GLOBULIN: 3.6 G/DL
GLUCOSE BLD-MCNC: 95 MG/DL (ref 70–99)
HCO3 VENOUS: 26 MMOL/L (ref 23–29)
HCT VFR BLD CALC: 41 % (ref 36–48)
HEMOGLOBIN: 13.5 G/DL (ref 12–16)
LYMPHOCYTES ABSOLUTE: 1.8 K/UL (ref 1–5.1)
LYMPHOCYTES RELATIVE PERCENT: 20.4 %
MCH RBC QN AUTO: 32.7 PG (ref 26–34)
MCHC RBC AUTO-ENTMCNC: 33 G/DL (ref 31–36)
MCV RBC AUTO: 99.2 FL (ref 80–100)
METHEMOGLOBIN VENOUS: 0.9 %
MONOCYTES ABSOLUTE: 0.6 K/UL (ref 0–1.3)
MONOCYTES RELATIVE PERCENT: 6.7 %
NEUTROPHILS ABSOLUTE: 6.2 K/UL (ref 1.7–7.7)
NEUTROPHILS RELATIVE PERCENT: 70.5 %
O2 CONTENT, VEN: 16 ML/DL
O2 SAT, VEN: 89 %
O2 THERAPY: NORMAL
PCO2, VEN: 43.9 MMHG (ref 40–50)
PDW BLD-RTO: 14 % (ref 12.4–15.4)
PH VENOUS: 7.39 (ref 7.35–7.45)
PLATELET # BLD: 210 K/UL (ref 135–450)
PMV BLD AUTO: 7.9 FL (ref 5–10.5)
PO2, VEN: 55 MMHG
POTASSIUM REFLEX MAGNESIUM: 4.6 MMOL/L (ref 3.5–5.1)
PRO-BNP: 1665 PG/ML (ref 0–124)
RBC # BLD: 4.13 M/UL (ref 4–5.2)
SODIUM BLD-SCNC: 140 MMOL/L (ref 136–145)
TCO2 CALC VENOUS: 28 MMOL/L
TOTAL PROTEIN: 6.9 G/DL (ref 6.4–8.2)
TROPONIN: <0.01 NG/ML
WBC # BLD: 8.7 K/UL (ref 4–11)

## 2019-07-28 PROCEDURE — G0378 HOSPITAL OBSERVATION PER HR: HCPCS

## 2019-07-28 PROCEDURE — 84484 ASSAY OF TROPONIN QUANT: CPT

## 2019-07-28 PROCEDURE — 6370000000 HC RX 637 (ALT 250 FOR IP): Performed by: INTERNAL MEDICINE

## 2019-07-28 PROCEDURE — 36415 COLL VENOUS BLD VENIPUNCTURE: CPT

## 2019-07-28 PROCEDURE — 83880 ASSAY OF NATRIURETIC PEPTIDE: CPT

## 2019-07-28 PROCEDURE — 80053 COMPREHEN METABOLIC PANEL: CPT

## 2019-07-28 PROCEDURE — 96374 THER/PROPH/DIAG INJ IV PUSH: CPT

## 2019-07-28 PROCEDURE — 6360000002 HC RX W HCPCS: Performed by: PHYSICIAN ASSISTANT

## 2019-07-28 PROCEDURE — 1200000000 HC SEMI PRIVATE

## 2019-07-28 PROCEDURE — 96372 THER/PROPH/DIAG INJ SC/IM: CPT

## 2019-07-28 PROCEDURE — 94761 N-INVAS EAR/PLS OXIMETRY MLT: CPT

## 2019-07-28 PROCEDURE — 96375 TX/PRO/DX INJ NEW DRUG ADDON: CPT

## 2019-07-28 PROCEDURE — 6370000000 HC RX 637 (ALT 250 FOR IP): Performed by: PHYSICIAN ASSISTANT

## 2019-07-28 PROCEDURE — 99285 EMERGENCY DEPT VISIT HI MDM: CPT

## 2019-07-28 PROCEDURE — 82803 BLOOD GASES ANY COMBINATION: CPT

## 2019-07-28 PROCEDURE — 93005 ELECTROCARDIOGRAM TRACING: CPT | Performed by: NURSE PRACTITIONER

## 2019-07-28 PROCEDURE — 71045 X-RAY EXAM CHEST 1 VIEW: CPT

## 2019-07-28 PROCEDURE — 6360000002 HC RX W HCPCS: Performed by: INTERNAL MEDICINE

## 2019-07-28 PROCEDURE — 2580000003 HC RX 258: Performed by: INTERNAL MEDICINE

## 2019-07-28 PROCEDURE — 85025 COMPLETE CBC W/AUTO DIFF WBC: CPT

## 2019-07-28 PROCEDURE — 94640 AIRWAY INHALATION TREATMENT: CPT

## 2019-07-28 RX ORDER — FUROSEMIDE 10 MG/ML
40 INJECTION INTRAMUSCULAR; INTRAVENOUS ONCE
Status: COMPLETED | OUTPATIENT
Start: 2019-07-28 | End: 2019-07-28

## 2019-07-28 RX ORDER — ALBUTEROL SULFATE 90 UG/1
2 AEROSOL, METERED RESPIRATORY (INHALATION)
Status: DISCONTINUED | OUTPATIENT
Start: 2019-07-28 | End: 2019-07-31 | Stop reason: HOSPADM

## 2019-07-28 RX ORDER — CARVEDILOL 25 MG/1
25 TABLET ORAL 2 TIMES DAILY
Status: DISCONTINUED | OUTPATIENT
Start: 2019-07-28 | End: 2019-07-31 | Stop reason: HOSPADM

## 2019-07-28 RX ORDER — ATORVASTATIN CALCIUM 10 MG/1
10 TABLET, FILM COATED ORAL NIGHTLY
Status: DISCONTINUED | OUTPATIENT
Start: 2019-07-28 | End: 2019-07-31 | Stop reason: HOSPADM

## 2019-07-28 RX ORDER — FUROSEMIDE 10 MG/ML
40 INJECTION INTRAMUSCULAR; INTRAVENOUS DAILY
Status: DISCONTINUED | OUTPATIENT
Start: 2019-07-28 | End: 2019-07-29

## 2019-07-28 RX ORDER — ACETAMINOPHEN 500 MG
500 TABLET ORAL EVERY 6 HOURS PRN
Status: DISCONTINUED | OUTPATIENT
Start: 2019-07-28 | End: 2019-07-31 | Stop reason: HOSPADM

## 2019-07-28 RX ORDER — OXYCODONE HYDROCHLORIDE AND ACETAMINOPHEN 5; 325 MG/1; MG/1
1 TABLET ORAL EVERY 4 HOURS PRN
Status: DISCONTINUED | OUTPATIENT
Start: 2019-07-28 | End: 2019-07-31 | Stop reason: HOSPADM

## 2019-07-28 RX ORDER — POTASSIUM CHLORIDE 20 MEQ/1
40 TABLET, EXTENDED RELEASE ORAL PRN
Status: DISCONTINUED | OUTPATIENT
Start: 2019-07-28 | End: 2019-07-31 | Stop reason: HOSPADM

## 2019-07-28 RX ORDER — LANOLIN ALCOHOL/MO/W.PET/CERES
400 CREAM (GRAM) TOPICAL DAILY
Status: DISCONTINUED | OUTPATIENT
Start: 2019-07-28 | End: 2019-07-31 | Stop reason: HOSPADM

## 2019-07-28 RX ORDER — PREDNISONE 20 MG/1
40 TABLET ORAL DAILY
Status: DISCONTINUED | OUTPATIENT
Start: 2019-07-28 | End: 2019-07-31 | Stop reason: HOSPADM

## 2019-07-28 RX ORDER — SODIUM CHLORIDE 0.9 % (FLUSH) 0.9 %
10 SYRINGE (ML) INJECTION EVERY 12 HOURS SCHEDULED
Status: DISCONTINUED | OUTPATIENT
Start: 2019-07-28 | End: 2019-07-31 | Stop reason: HOSPADM

## 2019-07-28 RX ORDER — MULTIVITAMIN WITH IRON
250 TABLET ORAL DAILY
Status: DISCONTINUED | OUTPATIENT
Start: 2019-07-28 | End: 2019-07-28

## 2019-07-28 RX ORDER — ALBUTEROL SULFATE 2.5 MG/3ML
5 SOLUTION RESPIRATORY (INHALATION) ONCE
Status: COMPLETED | OUTPATIENT
Start: 2019-07-28 | End: 2019-07-28

## 2019-07-28 RX ORDER — ONDANSETRON 2 MG/ML
4 INJECTION INTRAMUSCULAR; INTRAVENOUS EVERY 6 HOURS PRN
Status: DISCONTINUED | OUTPATIENT
Start: 2019-07-28 | End: 2019-07-31 | Stop reason: HOSPADM

## 2019-07-28 RX ORDER — GABAPENTIN 400 MG/1
800 CAPSULE ORAL 3 TIMES DAILY
Status: DISCONTINUED | OUTPATIENT
Start: 2019-07-28 | End: 2019-07-31 | Stop reason: HOSPADM

## 2019-07-28 RX ORDER — SODIUM CHLORIDE 0.9 % (FLUSH) 0.9 %
10 SYRINGE (ML) INJECTION PRN
Status: DISCONTINUED | OUTPATIENT
Start: 2019-07-28 | End: 2019-07-31 | Stop reason: HOSPADM

## 2019-07-28 RX ORDER — METHYLPREDNISOLONE SODIUM SUCCINATE 125 MG/2ML
125 INJECTION, POWDER, LYOPHILIZED, FOR SOLUTION INTRAMUSCULAR; INTRAVENOUS ONCE
Status: COMPLETED | OUTPATIENT
Start: 2019-07-28 | End: 2019-07-28

## 2019-07-28 RX ORDER — FAMOTIDINE 20 MG/1
20 TABLET, FILM COATED ORAL 2 TIMES DAILY
Status: DISCONTINUED | OUTPATIENT
Start: 2019-07-28 | End: 2019-07-31 | Stop reason: HOSPADM

## 2019-07-28 RX ORDER — POTASSIUM CHLORIDE 7.45 MG/ML
10 INJECTION INTRAVENOUS PRN
Status: DISCONTINUED | OUTPATIENT
Start: 2019-07-28 | End: 2019-07-31 | Stop reason: HOSPADM

## 2019-07-28 RX ORDER — MAGNESIUM SULFATE 1 G/100ML
1 INJECTION INTRAVENOUS PRN
Status: DISCONTINUED | OUTPATIENT
Start: 2019-07-28 | End: 2019-07-31 | Stop reason: HOSPADM

## 2019-07-28 RX ORDER — CYCLOBENZAPRINE HCL 10 MG
5 TABLET ORAL 3 TIMES DAILY PRN
Status: DISCONTINUED | OUTPATIENT
Start: 2019-07-28 | End: 2019-07-31 | Stop reason: HOSPADM

## 2019-07-28 RX ORDER — IPRATROPIUM BROMIDE AND ALBUTEROL SULFATE 2.5; .5 MG/3ML; MG/3ML
1 SOLUTION RESPIRATORY (INHALATION) ONCE
Status: COMPLETED | OUTPATIENT
Start: 2019-07-28 | End: 2019-07-28

## 2019-07-28 RX ADMIN — FUROSEMIDE 40 MG: 10 INJECTION, SOLUTION INTRAMUSCULAR; INTRAVENOUS at 12:04

## 2019-07-28 RX ADMIN — SODIUM CHLORIDE, PRESERVATIVE FREE 10 ML: 5 INJECTION INTRAVENOUS at 20:10

## 2019-07-28 RX ADMIN — SACUBITRIL AND VALSARTAN 1 TABLET: 97; 103 TABLET, FILM COATED ORAL at 20:10

## 2019-07-28 RX ADMIN — ENOXAPARIN SODIUM 40 MG: 40 INJECTION SUBCUTANEOUS at 14:20

## 2019-07-28 RX ADMIN — ALBUTEROL SULFATE 5 MG: 2.5 SOLUTION RESPIRATORY (INHALATION) at 11:16

## 2019-07-28 RX ADMIN — GABAPENTIN 800 MG: 400 CAPSULE ORAL at 14:20

## 2019-07-28 RX ADMIN — IPRATROPIUM BROMIDE 2 PUFF: 17 AEROSOL, METERED RESPIRATORY (INHALATION) at 16:13

## 2019-07-28 RX ADMIN — METHYLPREDNISOLONE SODIUM SUCCINATE 125 MG: 125 INJECTION, POWDER, FOR SOLUTION INTRAMUSCULAR; INTRAVENOUS at 11:08

## 2019-07-28 RX ADMIN — GABAPENTIN 800 MG: 400 CAPSULE ORAL at 20:10

## 2019-07-28 RX ADMIN — ATORVASTATIN CALCIUM 10 MG: 10 TABLET, FILM COATED ORAL at 20:10

## 2019-07-28 RX ADMIN — ALBUTEROL SULFATE 2 PUFF: 90 AEROSOL, METERED RESPIRATORY (INHALATION) at 16:09

## 2019-07-28 RX ADMIN — FAMOTIDINE 20 MG: 20 TABLET ORAL at 20:10

## 2019-07-28 RX ADMIN — ALBUTEROL SULFATE 2 PUFF: 90 AEROSOL, METERED RESPIRATORY (INHALATION) at 20:12

## 2019-07-28 RX ADMIN — IPRATROPIUM BROMIDE 2 PUFF: 17 AEROSOL, METERED RESPIRATORY (INHALATION) at 20:12

## 2019-07-28 RX ADMIN — CYCLOBENZAPRINE HYDROCHLORIDE 5 MG: 10 TABLET, FILM COATED ORAL at 21:20

## 2019-07-28 RX ADMIN — MAGNESIUM OXIDE TAB 400 MG (240 MG ELEMENTAL MG) 400 MG: 400 (240 MG) TAB at 14:20

## 2019-07-28 RX ADMIN — IPRATROPIUM BROMIDE AND ALBUTEROL SULFATE 1 AMPULE: .5; 3 SOLUTION RESPIRATORY (INHALATION) at 11:17

## 2019-07-28 RX ADMIN — PREDNISONE 40 MG: 20 TABLET ORAL at 14:20

## 2019-07-28 RX ADMIN — SACUBITRIL AND VALSARTAN 1 TABLET: 97; 103 TABLET, FILM COATED ORAL at 14:51

## 2019-07-28 ASSESSMENT — PAIN SCALES - GENERAL
PAINLEVEL_OUTOF10: 1
PAINLEVEL_OUTOF10: 3

## 2019-07-28 ASSESSMENT — PAIN - FUNCTIONAL ASSESSMENT: PAIN_FUNCTIONAL_ASSESSMENT: ACTIVITIES ARE NOT PREVENTED

## 2019-07-28 ASSESSMENT — PAIN DESCRIPTION - ORIENTATION: ORIENTATION: RIGHT

## 2019-07-28 ASSESSMENT — PAIN DESCRIPTION - LOCATION: LOCATION: KNEE

## 2019-07-28 ASSESSMENT — PAIN DESCRIPTION - DESCRIPTORS: DESCRIPTORS: ACHING;DISCOMFORT

## 2019-07-28 ASSESSMENT — PAIN DESCRIPTION - PROGRESSION: CLINICAL_PROGRESSION: NOT CHANGED

## 2019-07-28 ASSESSMENT — PAIN DESCRIPTION - PAIN TYPE: TYPE: CHRONIC PAIN

## 2019-07-28 ASSESSMENT — PAIN DESCRIPTION - ONSET: ONSET: ON-GOING

## 2019-07-28 ASSESSMENT — PAIN DESCRIPTION - FREQUENCY: FREQUENCY: CONTINUOUS

## 2019-07-28 NOTE — ED PROVIDER NOTES
[address]  [address] [address]  Dept: [phone]  Loc: [address] ENCOUNTER        This patient was not seen or evaluated by the attending physician. I evaluated this patient, the attending physician was available for consultation. CHIEF COMPLAINT    Chief Complaint   Patient presents with    Shortness of Breath     started yesterday  hx copd,chf  denies oxygen use       HPI    Rajani Esparza is a 78 y.o. female who presents with shortness of breath. Onset was yesterday. The duration has been constant since the onset. The context was that the symptoms started at rest. The patient has had associated chest pain that started 2 days ago, intermittent since the onset, worse with coughing. No alleviating factors. Patient does report orthopnea and paroxysmal nocturnal dyspnea that has been worsening. REVIEW OF SYSTEMS    Cardiac: see HPI, no palpitations, no syncope  Respiratory: see HPI, +nonproductive cough  Neurologic: no syncope or LOC  GI: no vomiting, no diarrhea  General: no fever  All other systems reviewed and are negative. PAST MEDICAL & SURGICAL HISTORY    Past Medical History:   Diagnosis Date    [facility]     COPD [facility]chronic obstructive pulmonary disease) ([facility]     Hypertension      Past Surgical History:   Procedure Laterality Date    CARPAL TUNNEL RELEASE      CHOLECYSTECTOMY      KNEE SURGERY      TONSILLECTOMY      TUBAL LIGATION         CURRENT MEDICATIONS  (may include discharge medications prescribed in the ED)  Current Outpatient Rx   Medication Sig Dispense Refill    oxyCODONE-acetaminophen (PERCOCET) 5-325 MG per tablet Take 1 tablet by mouth every 4 hours as needed for Pain.       carvedilol (COREG) 25 MG tablet TAKE 1 TABLET BY MOUTH TWICE DAILY 180 tablet 5    sacubitril-valsartan (ENTRESTO)  MG per tablet Take 1 tablet      Spouse name: None    Number of children: None    Years of education: None    Highest education level: None   Occupational History    None   Social Needs    Financial resource strain: None    Food insecurity:     Worry: None     Inability: None    Transportation needs:     Medical: None     Non-medical: None   Tobacco Use    Smoking status: Current Every Day Smoker     Packs/day: 0.75     Types: Cigarettes    Smokeless tobacco: Never Used   Substance and Sexual Activity    Alcohol use: No    Drug use: No    Sexual activity: None   Lifestyle    Physical activity:     Days per week: None     Minutes per session: None    Stress: None   Relationships    Social connections:     Talks on phone: None     Gets together: None     Attends Sikh service: None     Active member of club or organization: None     Attends meetings of clubs or organizations: None     Relationship status: None    Intimate partner violence:     Fear of current or ex partner: None     Emotionally abused: None     Physically abused: None     Forced sexual activity: None   Other Topics Concern    None   Social History Narrative    None     Family History   Problem Relation Age of Onset    Hypertension Sister        PHYSICAL EXAM    VITAL SIGNS: /83   Pulse 92   Temp 99 °F (37.2 °C) (Oral)   Resp 24   Wt 273 lb 5.9 oz (124 kg)   SpO2 96%   BMI 44.12 kg/m²    Constitutional:  Well developed, well nourished, mild distress   HENT:  Atraumatic, moist mucus membranes  Neck: supple, no JVD   Respiratory: +faint expiratory wheezes, no retractions   Cardiovascular:  regular rate, no murmurs  Vascular: Radial and DP pulses 2+ and equal bilaterally  GI:  Soft, nontender, normal bowel sounds  Musculoskeletal:  no lower extremity edema, no lower extremity asymmetry, no calf tenderness, no thigh tenderness, no acute deformities  Integument:  Skin is warm and dry, no petechiae   Neurologic:  Alert & oriented, no slurred

## 2019-07-28 NOTE — H&P
Labs     07/28/19  1113   WBC 8.7   HGB 13.5   HCT 41.0         RENAL  Recent Labs     07/28/19  1113      K 4.6      CO2 23   BUN 9   CREATININE 0.8     LFT'S  Recent Labs     07/28/19  1113   AST 12*   ALT 7*   BILITOT 0.9   ALKPHOS 59     COAG  No results for input(s): INR in the last 72 hours. CARDIAC ENZYMES  Recent Labs     07/28/19  1113   TROPONINI <0.01       U/A:    Lab Results   Component Value Date    COLORU YELLOW 10/08/2015    WBCUA 1 10/08/2015    RBCUA 3 10/08/2015    BACTERIA RARE 10/08/2015    CLARITYU Clear 10/08/2015    SPECGRAV 1.028 10/08/2015    LEUKOCYTESUR Negative 10/08/2015    BLOODU TRACE 10/08/2015    GLUCOSEU Negative 10/08/2015       ABG  No results found for: FHA3MBL, BEART, U2QQKAGG, PHART, THGBART, STE5LOX, PO2ART, SGT5KTX    UA:No results for input(s): NITRITE, COLORU, PHUR, LABCAST, WBCUA, RBCUA, MUCUS, TRICHOMONAS, YEAST, BACTERIA, CLARITYU, SPECGRAV, LEUKOCYTESUR, UROBILINOGEN, BILIRUBINUR, BLOODU, GLUCOSEU, KETUA, AMORPHOUS in the last 72 hours. Microbiology:  No results for input(s): LABGRAM, LABANAE, ORG, CXSURG in the last 72 hours. Nasal Culture: No results for input(s): ORG, MRSAPCR in the last 72 hours. Blood Culture: No results for input(s): BC, BLOODCULT2, ORG in the last 72 hours. Fungal Culture:   No results for input(s): FUNGSM in the last 72 hours. No results for input(s): FUNCXBLD in the last 72 hours. CSF Culture:  No results for input(s): COLORCSF, APPEARCSF, CFTUBE, CLOTCSF, WBCCSF, RBCCSF, NEUTCSF, NUMCELLSCSF, LYMPHSCSF, MONOCSF, GLUCCSF, VOLCSF in the last 72 hours. Respiratory Culture:  No results for input(s): Davina Stockton in the last 72 hours. AFB:No results for input(s): AFBSMEAR in the last 72 hours. Urine Culture  No results for input(s): LABURIN in the last 72 hours. RADIOLOGY:    XR CHEST PORTABLE   Final Result   Interstitial prominence predominantly centrally and in the lung bases likely   related to edema.

## 2019-07-29 PROBLEM — I50.22 CHRONIC LEFT SYSTOLIC HEART FAILURE (HCC): Status: ACTIVE | Noted: 2019-07-28

## 2019-07-29 LAB
ALBUMIN SERPL-MCNC: 3.4 G/DL (ref 3.4–5)
ANION GAP SERPL CALCULATED.3IONS-SCNC: 15 MMOL/L (ref 3–16)
ANION GAP SERPL CALCULATED.3IONS-SCNC: 16 MMOL/L (ref 3–16)
BUN BLDV-MCNC: 17 MG/DL (ref 7–20)
BUN BLDV-MCNC: 24 MG/DL (ref 7–20)
CALCIUM SERPL-MCNC: 9.3 MG/DL (ref 8.3–10.6)
CALCIUM SERPL-MCNC: 9.4 MG/DL (ref 8.3–10.6)
CHLORIDE BLD-SCNC: 103 MMOL/L (ref 99–110)
CHLORIDE BLD-SCNC: 104 MMOL/L (ref 99–110)
CO2: 22 MMOL/L (ref 21–32)
CO2: 24 MMOL/L (ref 21–32)
CREAT SERPL-MCNC: 1.1 MG/DL (ref 0.6–1.2)
CREAT SERPL-MCNC: 1.3 MG/DL (ref 0.6–1.2)
EKG ATRIAL RATE: 99 BPM
EKG DIAGNOSIS: NORMAL
EKG P AXIS: -1 DEGREES
EKG P-R INTERVAL: 180 MS
EKG Q-T INTERVAL: 390 MS
EKG QRS DURATION: 152 MS
EKG QTC CALCULATION (BAZETT): 500 MS
EKG R AXIS: -24 DEGREES
EKG T AXIS: 28 DEGREES
EKG VENTRICULAR RATE: 99 BPM
GFR AFRICAN AMERICAN: 49
GFR AFRICAN AMERICAN: 59
GFR NON-AFRICAN AMERICAN: 40
GFR NON-AFRICAN AMERICAN: 49
GLUCOSE BLD-MCNC: 147 MG/DL (ref 70–99)
GLUCOSE BLD-MCNC: 151 MG/DL (ref 70–99)
HCT VFR BLD CALC: 41.3 % (ref 36–48)
HEMOGLOBIN: 13.5 G/DL (ref 12–16)
MAGNESIUM: 2.1 MG/DL (ref 1.8–2.4)
MCH RBC QN AUTO: 32.6 PG (ref 26–34)
MCHC RBC AUTO-ENTMCNC: 32.8 G/DL (ref 31–36)
MCV RBC AUTO: 99.5 FL (ref 80–100)
PDW BLD-RTO: 14.2 % (ref 12.4–15.4)
PHOSPHORUS: 2.5 MG/DL (ref 2.5–4.9)
PLATELET # BLD: 228 K/UL (ref 135–450)
PMV BLD AUTO: 8.3 FL (ref 5–10.5)
POTASSIUM REFLEX MAGNESIUM: 4.1 MMOL/L (ref 3.5–5.1)
POTASSIUM SERPL-SCNC: 4.1 MMOL/L (ref 3.5–5.1)
RBC # BLD: 4.15 M/UL (ref 4–5.2)
SODIUM BLD-SCNC: 142 MMOL/L (ref 136–145)
SODIUM BLD-SCNC: 142 MMOL/L (ref 136–145)
WBC # BLD: 13.9 K/UL (ref 4–11)

## 2019-07-29 PROCEDURE — 2580000003 HC RX 258: Performed by: INTERNAL MEDICINE

## 2019-07-29 PROCEDURE — 83735 ASSAY OF MAGNESIUM: CPT

## 2019-07-29 PROCEDURE — 99222 1ST HOSP IP/OBS MODERATE 55: CPT | Performed by: INTERNAL MEDICINE

## 2019-07-29 PROCEDURE — G0378 HOSPITAL OBSERVATION PER HR: HCPCS

## 2019-07-29 PROCEDURE — 96376 TX/PRO/DX INJ SAME DRUG ADON: CPT

## 2019-07-29 PROCEDURE — 85027 COMPLETE CBC AUTOMATED: CPT

## 2019-07-29 PROCEDURE — 94640 AIRWAY INHALATION TREATMENT: CPT

## 2019-07-29 PROCEDURE — 6360000002 HC RX W HCPCS: Performed by: INTERNAL MEDICINE

## 2019-07-29 PROCEDURE — 96361 HYDRATE IV INFUSION ADD-ON: CPT

## 2019-07-29 PROCEDURE — 96372 THER/PROPH/DIAG INJ SC/IM: CPT

## 2019-07-29 PROCEDURE — 6370000000 HC RX 637 (ALT 250 FOR IP): Performed by: INTERNAL MEDICINE

## 2019-07-29 PROCEDURE — 2060000000 HC ICU INTERMEDIATE R&B

## 2019-07-29 PROCEDURE — 94761 N-INVAS EAR/PLS OXIMETRY MLT: CPT

## 2019-07-29 PROCEDURE — 93010 ELECTROCARDIOGRAM REPORT: CPT | Performed by: INTERNAL MEDICINE

## 2019-07-29 PROCEDURE — 80069 RENAL FUNCTION PANEL: CPT

## 2019-07-29 PROCEDURE — 36415 COLL VENOUS BLD VENIPUNCTURE: CPT

## 2019-07-29 RX ORDER — 0.9 % SODIUM CHLORIDE 0.9 %
500 INTRAVENOUS SOLUTION INTRAVENOUS ONCE
Status: COMPLETED | OUTPATIENT
Start: 2019-07-29 | End: 2019-07-29

## 2019-07-29 RX ORDER — 0.9 % SODIUM CHLORIDE 0.9 %
250 INTRAVENOUS SOLUTION INTRAVENOUS ONCE
Status: COMPLETED | OUTPATIENT
Start: 2019-07-29 | End: 2019-07-29

## 2019-07-29 RX ADMIN — ALBUTEROL SULFATE 2 PUFF: 90 AEROSOL, METERED RESPIRATORY (INHALATION) at 11:47

## 2019-07-29 RX ADMIN — IPRATROPIUM BROMIDE 2 PUFF: 17 AEROSOL, METERED RESPIRATORY (INHALATION) at 11:47

## 2019-07-29 RX ADMIN — SODIUM CHLORIDE 500 ML: 9 INJECTION, SOLUTION INTRAVENOUS at 21:02

## 2019-07-29 RX ADMIN — IPRATROPIUM BROMIDE 2 PUFF: 17 AEROSOL, METERED RESPIRATORY (INHALATION) at 20:24

## 2019-07-29 RX ADMIN — SODIUM CHLORIDE, PRESERVATIVE FREE 10 ML: 5 INJECTION INTRAVENOUS at 21:00

## 2019-07-29 RX ADMIN — PREDNISONE 40 MG: 20 TABLET ORAL at 08:59

## 2019-07-29 RX ADMIN — GABAPENTIN 800 MG: 400 CAPSULE ORAL at 08:58

## 2019-07-29 RX ADMIN — SACUBITRIL AND VALSARTAN 1 TABLET: 97; 103 TABLET, FILM COATED ORAL at 12:29

## 2019-07-29 RX ADMIN — IPRATROPIUM BROMIDE 2 PUFF: 17 AEROSOL, METERED RESPIRATORY (INHALATION) at 16:59

## 2019-07-29 RX ADMIN — ENOXAPARIN SODIUM 40 MG: 40 INJECTION SUBCUTANEOUS at 08:58

## 2019-07-29 RX ADMIN — CARVEDILOL 25 MG: 25 TABLET, FILM COATED ORAL at 08:58

## 2019-07-29 RX ADMIN — ATORVASTATIN CALCIUM 10 MG: 10 TABLET, FILM COATED ORAL at 21:00

## 2019-07-29 RX ADMIN — SODIUM CHLORIDE 250 ML: 9 INJECTION, SOLUTION INTRAVENOUS at 18:46

## 2019-07-29 RX ADMIN — GABAPENTIN 800 MG: 400 CAPSULE ORAL at 21:00

## 2019-07-29 RX ADMIN — FAMOTIDINE 20 MG: 20 TABLET ORAL at 08:58

## 2019-07-29 RX ADMIN — IPRATROPIUM BROMIDE 2 PUFF: 17 AEROSOL, METERED RESPIRATORY (INHALATION) at 08:15

## 2019-07-29 RX ADMIN — ALBUTEROL SULFATE 2 PUFF: 90 AEROSOL, METERED RESPIRATORY (INHALATION) at 08:15

## 2019-07-29 RX ADMIN — FAMOTIDINE 20 MG: 20 TABLET ORAL at 21:00

## 2019-07-29 RX ADMIN — SODIUM CHLORIDE, PRESERVATIVE FREE 10 ML: 5 INJECTION INTRAVENOUS at 09:07

## 2019-07-29 RX ADMIN — GABAPENTIN 800 MG: 400 CAPSULE ORAL at 12:29

## 2019-07-29 RX ADMIN — ALBUTEROL SULFATE 2 PUFF: 90 AEROSOL, METERED RESPIRATORY (INHALATION) at 16:59

## 2019-07-29 RX ADMIN — MAGNESIUM OXIDE TAB 400 MG (240 MG ELEMENTAL MG) 400 MG: 400 (240 MG) TAB at 08:58

## 2019-07-29 RX ADMIN — FUROSEMIDE 40 MG: 10 INJECTION, SOLUTION INTRAMUSCULAR; INTRAVENOUS at 08:58

## 2019-07-29 RX ADMIN — ALBUTEROL SULFATE 2 PUFF: 90 AEROSOL, METERED RESPIRATORY (INHALATION) at 20:24

## 2019-07-29 RX ADMIN — SACUBITRIL AND VALSARTAN 1 TABLET: 97; 103 TABLET, FILM COATED ORAL at 21:00

## 2019-07-29 ASSESSMENT — PAIN SCALES - GENERAL
PAINLEVEL_OUTOF10: 0

## 2019-07-29 NOTE — PLAN OF CARE
Problem: OXYGENATION/RESPIRATORY FUNCTION  Goal: Patient will maintain patent airway  7/28/2019 2252 by Eliana Byers RN  Outcome: Ongoing  7/28/2019 1346 by Sebastian Keys RN  Outcome: Ongoing  Goal: Patient will achieve/maintain normal respiratory rate/effort  Description  Respiratory rate and effort will be within normal limits for the patient  7/28/2019 2252 by Eliana Byers RN  Outcome: Ongoing  7/28/2019 1346 by Sebastian Keys RN  Outcome: Ongoing     Problem: HEMODYNAMIC STATUS  Goal: Patient has stable vital signs and fluid balance  7/28/2019 2252 by Eliana Byers RN  Outcome: Ongoing  7/28/2019 1346 by Sebastian Keys RN  Outcome: Ongoing     Problem: FLUID AND ELECTROLYTE IMBALANCE  Goal: Fluid and electrolyte balance are achieved/maintained  7/28/2019 2252 by Eliana Byers RN  Outcome: Ongoing  7/28/2019 1346 by Sebastian Keys RN  Outcome: Ongoing     Problem: ACTIVITY INTOLERANCE/IMPAIRED MOBILITY  Goal: Mobility/activity is maintained at optimum level for patient  7/28/2019 2252 by Eliana Byers RN  Outcome: Ongoing  7/28/2019 1346 by Sebastian Keys RN  Outcome: Ongoing     Problem: Infection:  Goal: Will remain free from infection  Description  Will remain free from infection  7/28/2019 2252 by Eliana Byres RN  Outcome: Ongoing  7/28/2019 1346 by Sebastian Keys RN  Outcome: Ongoing     Problem: Safety:  Goal: Free from accidental physical injury  Description  Free from accidental physical injury  7/28/2019 2252 by Eliana Byers RN  Outcome: Ongoing  7/28/2019 1346 by Sebastian Keys RN  Outcome: Ongoing  Goal: Free from intentional harm  Description  Free from intentional harm  7/28/2019 2252 by Eliana Byers RN  Outcome: Ongoing  7/28/2019 1346 by Sebastian Keys RN  Outcome: Ongoing     Problem: Daily Care:  Goal: Daily care needs are met  Description  Daily care needs are met  7/28/2019 2252 by Eliana Byers

## 2019-07-30 LAB
ANION GAP SERPL CALCULATED.3IONS-SCNC: 12 MMOL/L (ref 3–16)
BUN BLDV-MCNC: 25 MG/DL (ref 7–20)
CALCIUM SERPL-MCNC: 8.9 MG/DL (ref 8.3–10.6)
CHLORIDE BLD-SCNC: 106 MMOL/L (ref 99–110)
CO2: 25 MMOL/L (ref 21–32)
CREAT SERPL-MCNC: 1 MG/DL (ref 0.6–1.2)
GFR AFRICAN AMERICAN: >60
GFR NON-AFRICAN AMERICAN: 55
GLUCOSE BLD-MCNC: 96 MG/DL (ref 70–99)
POTASSIUM SERPL-SCNC: 4.1 MMOL/L (ref 3.5–5.1)
PRO-BNP: 949 PG/ML (ref 0–124)
SODIUM BLD-SCNC: 143 MMOL/L (ref 136–145)

## 2019-07-30 PROCEDURE — G0378 HOSPITAL OBSERVATION PER HR: HCPCS

## 2019-07-30 PROCEDURE — 36415 COLL VENOUS BLD VENIPUNCTURE: CPT

## 2019-07-30 PROCEDURE — 83880 ASSAY OF NATRIURETIC PEPTIDE: CPT

## 2019-07-30 PROCEDURE — 2580000003 HC RX 258: Performed by: INTERNAL MEDICINE

## 2019-07-30 PROCEDURE — 94640 AIRWAY INHALATION TREATMENT: CPT

## 2019-07-30 PROCEDURE — 96372 THER/PROPH/DIAG INJ SC/IM: CPT

## 2019-07-30 PROCEDURE — 6360000002 HC RX W HCPCS: Performed by: INTERNAL MEDICINE

## 2019-07-30 PROCEDURE — 6370000000 HC RX 637 (ALT 250 FOR IP): Performed by: INTERNAL MEDICINE

## 2019-07-30 PROCEDURE — 2060000000 HC ICU INTERMEDIATE R&B

## 2019-07-30 PROCEDURE — 80048 BASIC METABOLIC PNL TOTAL CA: CPT

## 2019-07-30 RX ADMIN — ALBUTEROL SULFATE 2 PUFF: 90 AEROSOL, METERED RESPIRATORY (INHALATION) at 15:57

## 2019-07-30 RX ADMIN — GABAPENTIN 800 MG: 400 CAPSULE ORAL at 21:34

## 2019-07-30 RX ADMIN — IPRATROPIUM BROMIDE 2 PUFF: 17 AEROSOL, METERED RESPIRATORY (INHALATION) at 15:57

## 2019-07-30 RX ADMIN — SODIUM CHLORIDE, PRESERVATIVE FREE 10 ML: 5 INJECTION INTRAVENOUS at 09:00

## 2019-07-30 RX ADMIN — ENOXAPARIN SODIUM 40 MG: 40 INJECTION SUBCUTANEOUS at 10:11

## 2019-07-30 RX ADMIN — SACUBITRIL AND VALSARTAN 1 TABLET: 97; 103 TABLET, FILM COATED ORAL at 10:12

## 2019-07-30 RX ADMIN — GABAPENTIN 800 MG: 400 CAPSULE ORAL at 10:11

## 2019-07-30 RX ADMIN — IPRATROPIUM BROMIDE 2 PUFF: 17 AEROSOL, METERED RESPIRATORY (INHALATION) at 20:13

## 2019-07-30 RX ADMIN — SODIUM CHLORIDE, PRESERVATIVE FREE 10 ML: 5 INJECTION INTRAVENOUS at 21:34

## 2019-07-30 RX ADMIN — ATORVASTATIN CALCIUM 10 MG: 10 TABLET, FILM COATED ORAL at 21:34

## 2019-07-30 RX ADMIN — FAMOTIDINE 20 MG: 20 TABLET ORAL at 21:34

## 2019-07-30 RX ADMIN — ALBUTEROL SULFATE 2 PUFF: 90 AEROSOL, METERED RESPIRATORY (INHALATION) at 12:26

## 2019-07-30 RX ADMIN — MAGNESIUM OXIDE TAB 400 MG (240 MG ELEMENTAL MG) 400 MG: 400 (240 MG) TAB at 10:13

## 2019-07-30 RX ADMIN — IPRATROPIUM BROMIDE 2 PUFF: 17 AEROSOL, METERED RESPIRATORY (INHALATION) at 12:26

## 2019-07-30 RX ADMIN — PREDNISONE 40 MG: 20 TABLET ORAL at 10:12

## 2019-07-30 RX ADMIN — FAMOTIDINE 20 MG: 20 TABLET ORAL at 10:12

## 2019-07-30 RX ADMIN — ALBUTEROL SULFATE 2 PUFF: 90 AEROSOL, METERED RESPIRATORY (INHALATION) at 20:13

## 2019-07-30 RX ADMIN — GABAPENTIN 800 MG: 400 CAPSULE ORAL at 14:33

## 2019-07-30 ASSESSMENT — PAIN SCALES - GENERAL
PAINLEVEL_OUTOF10: 0
PAINLEVEL_OUTOF10: 0

## 2019-07-31 VITALS
TEMPERATURE: 97.8 F | BODY MASS INDEX: 43.56 KG/M2 | DIASTOLIC BLOOD PRESSURE: 65 MMHG | SYSTOLIC BLOOD PRESSURE: 90 MMHG | HEART RATE: 72 BPM | HEIGHT: 67 IN | RESPIRATION RATE: 20 BRPM | WEIGHT: 277.56 LBS | OXYGEN SATURATION: 95 %

## 2019-07-31 LAB
ANION GAP SERPL CALCULATED.3IONS-SCNC: 13 MMOL/L (ref 3–16)
BUN BLDV-MCNC: 20 MG/DL (ref 7–20)
CALCIUM SERPL-MCNC: 8.8 MG/DL (ref 8.3–10.6)
CHLORIDE BLD-SCNC: 105 MMOL/L (ref 99–110)
CO2: 25 MMOL/L (ref 21–32)
CREAT SERPL-MCNC: 0.9 MG/DL (ref 0.6–1.2)
GFR AFRICAN AMERICAN: >60
GFR NON-AFRICAN AMERICAN: >60
GLUCOSE BLD-MCNC: 122 MG/DL (ref 70–99)
POTASSIUM SERPL-SCNC: 4.3 MMOL/L (ref 3.5–5.1)
SODIUM BLD-SCNC: 143 MMOL/L (ref 136–145)

## 2019-07-31 PROCEDURE — 94640 AIRWAY INHALATION TREATMENT: CPT

## 2019-07-31 PROCEDURE — 94760 N-INVAS EAR/PLS OXIMETRY 1: CPT

## 2019-07-31 PROCEDURE — 80048 BASIC METABOLIC PNL TOTAL CA: CPT

## 2019-07-31 PROCEDURE — 96372 THER/PROPH/DIAG INJ SC/IM: CPT

## 2019-07-31 PROCEDURE — 36415 COLL VENOUS BLD VENIPUNCTURE: CPT

## 2019-07-31 PROCEDURE — G0378 HOSPITAL OBSERVATION PER HR: HCPCS

## 2019-07-31 PROCEDURE — 2580000003 HC RX 258: Performed by: INTERNAL MEDICINE

## 2019-07-31 PROCEDURE — 6360000002 HC RX W HCPCS: Performed by: INTERNAL MEDICINE

## 2019-07-31 PROCEDURE — 6370000000 HC RX 637 (ALT 250 FOR IP): Performed by: INTERNAL MEDICINE

## 2019-07-31 RX ORDER — PREDNISONE 20 MG/1
40 TABLET ORAL DAILY
Qty: 4 TABLET | Refills: 0 | Status: SHIPPED | OUTPATIENT
Start: 2019-07-31 | End: 2019-08-02

## 2019-07-31 RX ORDER — CARVEDILOL 25 MG/1
12.5 TABLET ORAL 2 TIMES DAILY WITH MEALS
Qty: 180 TABLET | Refills: 0 | Status: SHIPPED | OUTPATIENT
Start: 2019-07-31 | End: 2020-05-28 | Stop reason: SDUPTHER

## 2019-07-31 RX ADMIN — ENOXAPARIN SODIUM 40 MG: 40 INJECTION SUBCUTANEOUS at 10:01

## 2019-07-31 RX ADMIN — PREDNISONE 40 MG: 20 TABLET ORAL at 10:02

## 2019-07-31 RX ADMIN — SODIUM CHLORIDE, PRESERVATIVE FREE 10 ML: 5 INJECTION INTRAVENOUS at 10:04

## 2019-07-31 RX ADMIN — IPRATROPIUM BROMIDE 2 PUFF: 17 AEROSOL, METERED RESPIRATORY (INHALATION) at 07:56

## 2019-07-31 RX ADMIN — ALBUTEROL SULFATE 2 PUFF: 90 AEROSOL, METERED RESPIRATORY (INHALATION) at 07:56

## 2019-07-31 RX ADMIN — GABAPENTIN 800 MG: 400 CAPSULE ORAL at 10:02

## 2019-07-31 RX ADMIN — MAGNESIUM OXIDE TAB 400 MG (240 MG ELEMENTAL MG) 400 MG: 400 (240 MG) TAB at 10:03

## 2019-07-31 RX ADMIN — FAMOTIDINE 20 MG: 20 TABLET ORAL at 10:02

## 2019-07-31 ASSESSMENT — PAIN SCALES - GENERAL: PAINLEVEL_OUTOF10: 0

## 2019-07-31 NOTE — PROGRESS NOTES
Nutrition Education    Type and Reason for Visit: Patient Education(CHF diet education)    Nutrition Assessment:  Pt seen per hospital protocol for \"CHF diet education\". Pt was seen in [facility] back in [facility] where she had been making some good changes. Pt reports she has been carrying out those changes and continuing to follow the diet. Pt reports appetite does vary but is good and weight has been stable. Pt declined any additional education. Told pt to contact RD if any questions arise. · Verbally reviewed information with Patient  · Written educational materials provided. · Contact name and number provided. · Refer to Patient Education activity for more details.     Electronically signed by Gee Candelaria RD, LD on 7/29/19 at 12:56 PM    Contact Number: [phone]
Vitamin A 8000 units Tabs     vitamin B-12 1000 MCG tablet  Commonly known as:  CYANOCOBALAMIN     vitamin C 500 MG tablet  Commonly known as:  ASCORBIC ACID     vitamin D 5000 units Caps capsule  Commonly known as:  CHOLECALCIFEROL               Where to Get Your Medications        These medications were sent to [address], [address] [place] [id number]      Hours:  24-hours Phone:  [phone]   carvedilol 25 MG tablet  predniSONE 20 MG tablet         Laura Kat, PharmD  Heart Failure Discharge Medication Reconciliation Program  [phone]

## 2019-07-31 NOTE — PLAN OF CARE
Pt medically stable. BMP WNL this am.  Vitals stable with SAO2 >90% on room air. Pt ambulates ad-saturnino in room without difficulty. Remains alert and oriented. Denies pain.

## 2019-08-02 ENCOUNTER — TELEPHONE (OUTPATIENT)
Dept: PHARMACY | Age: 69
End: 2019-08-02

## 2019-08-02 NOTE — TELEPHONE ENCOUNTER
[address]  Heart Failure Service  [phone]     NAME: [facility]  MEDICAL RECORD NUMBER:  [id number]. Follow up phone call:    Are you having any issues that need immediate attention? No     Do you have any signs or symptoms of fluid retention? No              []  Shortness of breath              []  Swelling of hands, feet, ankles, or lower legs              []  Abdominal fullness              []  Cough, especially at night or when you lie down              []  Fatigue that limits activity     Do you have any other signs or symptoms to report? No              []  Dizziness              []  Light headedness, like you are going to pass out              []  Headache                                                                                []  Other    Wt Readings from Last 6 Encounters:   07/31/19 277 lb 9 oz (125.9 kg)   06/05/19 270 lb (122.5 kg)   05/21/19 275 lb (124.7 kg)   05/14/19 274 lb (124.3 kg)   05/09/19 276 lb (125.2 kg)   05/02/19 276 lb (125.2 kg)         Do you have a working scale? She reports her scale was off today. Sounds like she has telehealth and vitals checked daily. She reports they re calibrated her scale and will reweigh tomorrow. Have you been checking your weight daily? Yes              If not, the patient was encouraged to do so. Dry weight = Dry weight: 271 pounds on her scale (276 on our scale) as of 4/2/19   What is your weight today? See above     With regards to your weight, when would you call the doctor? [x] increased by 3 pounds or more in one day               [x] 5 pounds or more in a week              [] weight above my dry weight              [] other                 [] unknown     Has your weight increased by 3 pounds or more in one day or 5 pounds or more in a week?   No          Please call the [facility] at [phone] or your Cardiologist ([facility] @ [phone]) if you have

## 2019-08-07 ENCOUNTER — HOSPITAL ENCOUNTER (INPATIENT)
Age: 69
LOS: 1 days | Discharge: HOME | DRG: 176 | End: 2019-08-08
Attending: EMERGENCY MEDICINE | Admitting: HOSPITALIST
Payer: MEDICARE

## 2019-08-07 ENCOUNTER — APPOINTMENT (OUTPATIENT)
Dept: CT IMAGING | Age: 69
DRG: 176 | End: 2019-08-07
Payer: MEDICARE

## 2019-08-07 ENCOUNTER — APPOINTMENT (OUTPATIENT)
Dept: GENERAL RADIOLOGY | Age: 69
DRG: 176 | End: 2019-08-07
Payer: MEDICARE

## 2019-08-07 DIAGNOSIS — I26.99 BILATERAL PULMONARY EMBOLISM (HCC): Primary | ICD-10-CM

## 2019-08-07 LAB
A/G RATIO: 0.9 (ref 1.1–2.2)
ALBUMIN SERPL-MCNC: 3.5 G/DL (ref 3.4–5)
ALP BLD-CCNC: 69 U/L (ref 40–129)
ALT SERPL-CCNC: 12 U/L (ref 10–40)
ANION GAP SERPL CALCULATED.3IONS-SCNC: 16 MMOL/L (ref 3–16)
ANION GAP SERPL CALCULATED.3IONS-SCNC: 17 MMOL/L (ref 3–16)
APTT: 197.2 SEC (ref 26–36)
APTT: 31.7 SEC (ref 26–36)
AST SERPL-CCNC: 11 U/L (ref 15–37)
BASOPHILS ABSOLUTE: 0.1 K/UL (ref 0–0.2)
BASOPHILS RELATIVE PERCENT: 1.3 %
BILIRUB SERPL-MCNC: 0.5 MG/DL (ref 0–1)
BUN BLDV-MCNC: 15 MG/DL (ref 7–20)
BUN BLDV-MCNC: 17 MG/DL (ref 7–20)
CALCIUM SERPL-MCNC: 9.2 MG/DL (ref 8.3–10.6)
CALCIUM SERPL-MCNC: 9.3 MG/DL (ref 8.3–10.6)
CHLORIDE BLD-SCNC: 104 MMOL/L (ref 99–110)
CHLORIDE BLD-SCNC: 105 MMOL/L (ref 99–110)
CO2: 19 MMOL/L (ref 21–32)
CO2: 22 MMOL/L (ref 21–32)
CREAT SERPL-MCNC: 1.1 MG/DL (ref 0.6–1.2)
CREAT SERPL-MCNC: 1.1 MG/DL (ref 0.6–1.2)
D DIMER: 932 NG/ML DDU (ref 0–229)
EKG ATRIAL RATE: 100 BPM
EKG DIAGNOSIS: NORMAL
EKG P AXIS: 33 DEGREES
EKG P-R INTERVAL: 168 MS
EKG Q-T INTERVAL: 400 MS
EKG QRS DURATION: 148 MS
EKG QTC CALCULATION (BAZETT): 516 MS
EKG R AXIS: -24 DEGREES
EKG T AXIS: 91 DEGREES
EKG VENTRICULAR RATE: 100 BPM
EOSINOPHILS ABSOLUTE: 0.3 K/UL (ref 0–0.6)
EOSINOPHILS RELATIVE PERCENT: 2.3 %
GFR AFRICAN AMERICAN: 59
GFR AFRICAN AMERICAN: 59
GFR NON-AFRICAN AMERICAN: 49
GFR NON-AFRICAN AMERICAN: 49
GLOBULIN: 3.7 G/DL
GLUCOSE BLD-MCNC: 108 MG/DL (ref 70–99)
GLUCOSE BLD-MCNC: 143 MG/DL (ref 70–99)
HCT VFR BLD CALC: 40.2 % (ref 36–48)
HCT VFR BLD CALC: 41.3 % (ref 36–48)
HEMOGLOBIN: 13.3 G/DL (ref 12–16)
HEMOGLOBIN: 13.8 G/DL (ref 12–16)
LACTIC ACID: 1.7 MMOL/L (ref 0.4–2)
LACTIC ACID: 2.6 MMOL/L (ref 0.4–2)
LYMPHOCYTES ABSOLUTE: 4.2 K/UL (ref 1–5.1)
LYMPHOCYTES RELATIVE PERCENT: 38.5 %
MCH RBC QN AUTO: 32.7 PG (ref 26–34)
MCH RBC QN AUTO: 32.8 PG (ref 26–34)
MCHC RBC AUTO-ENTMCNC: 33.1 G/DL (ref 31–36)
MCHC RBC AUTO-ENTMCNC: 33.5 G/DL (ref 31–36)
MCV RBC AUTO: 98.1 FL (ref 80–100)
MCV RBC AUTO: 98.5 FL (ref 80–100)
MONOCYTES ABSOLUTE: 0.6 K/UL (ref 0–1.3)
MONOCYTES RELATIVE PERCENT: 5.5 %
NEUTROPHILS ABSOLUTE: 5.7 K/UL (ref 1.7–7.7)
NEUTROPHILS RELATIVE PERCENT: 52.4 %
PDW BLD-RTO: 14.2 % (ref 12.4–15.4)
PDW BLD-RTO: 14.5 % (ref 12.4–15.4)
PLATELET # BLD: 304 K/UL (ref 135–450)
PLATELET # BLD: 329 K/UL (ref 135–450)
PMV BLD AUTO: 7.3 FL (ref 5–10.5)
PMV BLD AUTO: 7.4 FL (ref 5–10.5)
POTASSIUM REFLEX MAGNESIUM: 4.5 MMOL/L (ref 3.5–5.1)
POTASSIUM SERPL-SCNC: 4.7 MMOL/L (ref 3.5–5.1)
PRO-BNP: 1703 PG/ML (ref 0–124)
RBC # BLD: 4.08 M/UL (ref 4–5.2)
RBC # BLD: 4.21 M/UL (ref 4–5.2)
REASON FOR REJECTION: NORMAL
REJECTED TEST: NORMAL
SODIUM BLD-SCNC: 140 MMOL/L (ref 136–145)
SODIUM BLD-SCNC: 143 MMOL/L (ref 136–145)
TOTAL PROTEIN: 7.2 G/DL (ref 6.4–8.2)
TROPONIN: <0.01 NG/ML
WBC # BLD: 10.9 K/UL (ref 4–11)
WBC # BLD: 9.3 K/UL (ref 4–11)

## 2019-08-07 PROCEDURE — 71045 X-RAY EXAM CHEST 1 VIEW: CPT

## 2019-08-07 PROCEDURE — 6360000002 HC RX W HCPCS: Performed by: EMERGENCY MEDICINE

## 2019-08-07 PROCEDURE — 93005 ELECTROCARDIOGRAM TRACING: CPT | Performed by: NURSE PRACTITIONER

## 2019-08-07 PROCEDURE — 6360000004 HC RX CONTRAST MEDICATION: Performed by: EMERGENCY MEDICINE

## 2019-08-07 PROCEDURE — 6370000000 HC RX 637 (ALT 250 FOR IP): Performed by: EMERGENCY MEDICINE

## 2019-08-07 PROCEDURE — 85027 COMPLETE CBC AUTOMATED: CPT

## 2019-08-07 PROCEDURE — 99223 1ST HOSP IP/OBS HIGH 75: CPT | Performed by: INTERNAL MEDICINE

## 2019-08-07 PROCEDURE — 83605 ASSAY OF LACTIC ACID: CPT

## 2019-08-07 PROCEDURE — 80053 COMPREHEN METABOLIC PANEL: CPT

## 2019-08-07 PROCEDURE — 96375 TX/PRO/DX INJ NEW DRUG ADDON: CPT

## 2019-08-07 PROCEDURE — 2060000000 HC ICU INTERMEDIATE R&B

## 2019-08-07 PROCEDURE — 85025 COMPLETE CBC W/AUTO DIFF WBC: CPT

## 2019-08-07 PROCEDURE — 85730 THROMBOPLASTIN TIME PARTIAL: CPT

## 2019-08-07 PROCEDURE — 2580000003 HC RX 258: Performed by: EMERGENCY MEDICINE

## 2019-08-07 PROCEDURE — 6370000000 HC RX 637 (ALT 250 FOR IP): Performed by: INTERNAL MEDICINE

## 2019-08-07 PROCEDURE — 96365 THER/PROPH/DIAG IV INF INIT: CPT

## 2019-08-07 PROCEDURE — 36415 COLL VENOUS BLD VENIPUNCTURE: CPT

## 2019-08-07 PROCEDURE — 93010 ELECTROCARDIOGRAM REPORT: CPT | Performed by: INTERNAL MEDICINE

## 2019-08-07 PROCEDURE — 99285 EMERGENCY DEPT VISIT HI MDM: CPT

## 2019-08-07 PROCEDURE — 96361 HYDRATE IV INFUSION ADD-ON: CPT

## 2019-08-07 PROCEDURE — 83880 ASSAY OF NATRIURETIC PEPTIDE: CPT

## 2019-08-07 PROCEDURE — 84484 ASSAY OF TROPONIN QUANT: CPT

## 2019-08-07 PROCEDURE — 2580000003 HC RX 258: Performed by: INTERNAL MEDICINE

## 2019-08-07 PROCEDURE — 94760 N-INVAS EAR/PLS OXIMETRY 1: CPT

## 2019-08-07 PROCEDURE — 71260 CT THORAX DX C+: CPT

## 2019-08-07 PROCEDURE — 85379 FIBRIN DEGRADATION QUANT: CPT

## 2019-08-07 RX ORDER — ACETAMINOPHEN 325 MG/1
650 TABLET ORAL ONCE
Status: COMPLETED | OUTPATIENT
Start: 2019-08-07 | End: 2019-08-07

## 2019-08-07 RX ORDER — SODIUM CHLORIDE 0.9 % (FLUSH) 0.9 %
10 SYRINGE (ML) INJECTION PRN
Status: DISCONTINUED | OUTPATIENT
Start: 2019-08-07 | End: 2019-08-08 | Stop reason: HOSPADM

## 2019-08-07 RX ORDER — HEPARIN SODIUM 1000 [USP'U]/ML
6900 INJECTION, SOLUTION INTRAVENOUS; SUBCUTANEOUS ONCE
Status: COMPLETED | OUTPATIENT
Start: 2019-08-07 | End: 2019-08-07

## 2019-08-07 RX ORDER — PREDNISONE 20 MG/1
60 TABLET ORAL ONCE
Status: COMPLETED | OUTPATIENT
Start: 2019-08-07 | End: 2019-08-07

## 2019-08-07 RX ORDER — HEPARIN SODIUM 1000 [USP'U]/ML
80 INJECTION, SOLUTION INTRAVENOUS; SUBCUTANEOUS PRN
Status: DISCONTINUED | OUTPATIENT
Start: 2019-08-07 | End: 2019-08-07

## 2019-08-07 RX ORDER — ALBUTEROL SULFATE 90 UG/1
2 AEROSOL, METERED RESPIRATORY (INHALATION) EVERY 6 HOURS PRN
Status: DISCONTINUED | OUTPATIENT
Start: 2019-08-07 | End: 2019-08-08 | Stop reason: HOSPADM

## 2019-08-07 RX ORDER — HEPARIN SODIUM 10000 [USP'U]/100ML
18 INJECTION, SOLUTION INTRAVENOUS CONTINUOUS
Status: DISCONTINUED | OUTPATIENT
Start: 2019-08-07 | End: 2019-08-07 | Stop reason: SDUPTHER

## 2019-08-07 RX ORDER — ATORVASTATIN CALCIUM 10 MG/1
10 TABLET, FILM COATED ORAL NIGHTLY
Status: DISCONTINUED | OUTPATIENT
Start: 2019-08-07 | End: 2019-08-08 | Stop reason: HOSPADM

## 2019-08-07 RX ORDER — ONDANSETRON 2 MG/ML
4 INJECTION INTRAMUSCULAR; INTRAVENOUS EVERY 6 HOURS PRN
Status: DISCONTINUED | OUTPATIENT
Start: 2019-08-07 | End: 2019-08-08 | Stop reason: HOSPADM

## 2019-08-07 RX ORDER — HEPARIN SODIUM 1000 [USP'U]/ML
80 INJECTION, SOLUTION INTRAVENOUS; SUBCUTANEOUS ONCE
Status: DISCONTINUED | OUTPATIENT
Start: 2019-08-07 | End: 2019-08-07 | Stop reason: SDUPTHER

## 2019-08-07 RX ORDER — HEPARIN SODIUM 10000 [USP'U]/100ML
15.5 INJECTION, SOLUTION INTRAVENOUS CONTINUOUS
Status: DISCONTINUED | OUTPATIENT
Start: 2019-08-07 | End: 2019-08-07

## 2019-08-07 RX ORDER — SODIUM CHLORIDE 0.9 % (FLUSH) 0.9 %
10 SYRINGE (ML) INJECTION EVERY 12 HOURS SCHEDULED
Status: DISCONTINUED | OUTPATIENT
Start: 2019-08-07 | End: 2019-08-08 | Stop reason: HOSPADM

## 2019-08-07 RX ORDER — HEPARIN SODIUM 1000 [USP'U]/ML
40 INJECTION, SOLUTION INTRAVENOUS; SUBCUTANEOUS PRN
Status: DISCONTINUED | OUTPATIENT
Start: 2019-08-07 | End: 2019-08-07 | Stop reason: SDUPTHER

## 2019-08-07 RX ORDER — GABAPENTIN 400 MG/1
800 CAPSULE ORAL 3 TIMES DAILY
Status: DISCONTINUED | OUTPATIENT
Start: 2019-08-07 | End: 2019-08-08 | Stop reason: HOSPADM

## 2019-08-07 RX ORDER — HEPARIN SODIUM 1000 [USP'U]/ML
40 INJECTION, SOLUTION INTRAVENOUS; SUBCUTANEOUS PRN
Status: DISCONTINUED | OUTPATIENT
Start: 2019-08-07 | End: 2019-08-07

## 2019-08-07 RX ORDER — ACETAMINOPHEN 325 MG/1
650 TABLET ORAL EVERY 4 HOURS PRN
Status: DISCONTINUED | OUTPATIENT
Start: 2019-08-07 | End: 2019-08-08 | Stop reason: HOSPADM

## 2019-08-07 RX ORDER — 0.9 % SODIUM CHLORIDE 0.9 %
500 INTRAVENOUS SOLUTION INTRAVENOUS ONCE
Status: COMPLETED | OUTPATIENT
Start: 2019-08-07 | End: 2019-08-07

## 2019-08-07 RX ORDER — CARVEDILOL 12.5 MG/1
12.5 TABLET ORAL 2 TIMES DAILY WITH MEALS
Status: DISCONTINUED | OUTPATIENT
Start: 2019-08-07 | End: 2019-08-08 | Stop reason: HOSPADM

## 2019-08-07 RX ORDER — SODIUM CHLORIDE 9 MG/ML
INJECTION, SOLUTION INTRAVENOUS CONTINUOUS
Status: DISCONTINUED | OUTPATIENT
Start: 2019-08-07 | End: 2019-08-07

## 2019-08-07 RX ORDER — DIPHENHYDRAMINE HYDROCHLORIDE 50 MG/ML
12.5 INJECTION INTRAMUSCULAR; INTRAVENOUS ONCE
Status: COMPLETED | OUTPATIENT
Start: 2019-08-07 | End: 2019-08-07

## 2019-08-07 RX ORDER — LATANOPROST 50 UG/ML
1 SOLUTION/ DROPS OPHTHALMIC NIGHTLY
Status: DISCONTINUED | OUTPATIENT
Start: 2019-08-07 | End: 2019-08-08 | Stop reason: HOSPADM

## 2019-08-07 RX ORDER — HEPARIN SODIUM 1000 [USP'U]/ML
80 INJECTION, SOLUTION INTRAVENOUS; SUBCUTANEOUS PRN
Status: DISCONTINUED | OUTPATIENT
Start: 2019-08-07 | End: 2019-08-07 | Stop reason: SDUPTHER

## 2019-08-07 RX ADMIN — GABAPENTIN 800 MG: 400 CAPSULE ORAL at 14:45

## 2019-08-07 RX ADMIN — Medication 10 ML: at 21:38

## 2019-08-07 RX ADMIN — SODIUM CHLORIDE 500 ML: 9 INJECTION, SOLUTION INTRAVENOUS at 03:13

## 2019-08-07 RX ADMIN — GABAPENTIN 800 MG: 400 CAPSULE ORAL at 21:33

## 2019-08-07 RX ADMIN — HEPARIN SODIUM 15.5 ML/HR: 10000 INJECTION, SOLUTION INTRAVENOUS at 06:10

## 2019-08-07 RX ADMIN — ATORVASTATIN CALCIUM 10 MG: 10 TABLET, FILM COATED ORAL at 21:34

## 2019-08-07 RX ADMIN — LATANOPROST 1 DROP: 50 SOLUTION OPHTHALMIC at 21:34

## 2019-08-07 RX ADMIN — IOPAMIDOL 75 ML: 755 INJECTION, SOLUTION INTRAVENOUS at 05:09

## 2019-08-07 RX ADMIN — HEPARIN SODIUM 6900 UNITS: 1000 INJECTION INTRAVENOUS; SUBCUTANEOUS at 06:10

## 2019-08-07 RX ADMIN — SODIUM CHLORIDE: 9 INJECTION, SOLUTION INTRAVENOUS at 06:12

## 2019-08-07 RX ADMIN — PREDNISONE 60 MG: 20 TABLET ORAL at 04:27

## 2019-08-07 RX ADMIN — SACUBITRIL AND VALSARTAN 1 TABLET: 97; 103 TABLET, FILM COATED ORAL at 09:33

## 2019-08-07 RX ADMIN — DIPHENHYDRAMINE HYDROCHLORIDE 12.5 MG: 50 INJECTION, SOLUTION INTRAMUSCULAR; INTRAVENOUS at 04:27

## 2019-08-07 RX ADMIN — Medication 10 ML: at 09:34

## 2019-08-07 RX ADMIN — APIXABAN 10 MG: 5 TABLET, FILM COATED ORAL at 12:49

## 2019-08-07 RX ADMIN — APIXABAN 10 MG: 5 TABLET, FILM COATED ORAL at 21:33

## 2019-08-07 RX ADMIN — GABAPENTIN 800 MG: 400 CAPSULE ORAL at 09:33

## 2019-08-07 RX ADMIN — ACETAMINOPHEN 650 MG: 325 TABLET ORAL at 04:27

## 2019-08-07 RX ADMIN — SACUBITRIL AND VALSARTAN 1 TABLET: 97; 103 TABLET, FILM COATED ORAL at 21:34

## 2019-08-07 ASSESSMENT — PAIN - FUNCTIONAL ASSESSMENT: PAIN_FUNCTIONAL_ASSESSMENT: ACTIVITIES ARE NOT PREVENTED

## 2019-08-07 ASSESSMENT — PAIN DESCRIPTION - LOCATION: LOCATION: CHEST

## 2019-08-07 ASSESSMENT — ENCOUNTER SYMPTOMS
COUGH: 1
SHORTNESS OF BREATH: 1
BACK PAIN: 0

## 2019-08-07 ASSESSMENT — PAIN SCALES - GENERAL
PAINLEVEL_OUTOF10: 0
PAINLEVEL_OUTOF10: 2
PAINLEVEL_OUTOF10: 0
PAINLEVEL_OUTOF10: 3

## 2019-08-07 ASSESSMENT — PAIN DESCRIPTION - ORIENTATION: ORIENTATION: RIGHT

## 2019-08-07 ASSESSMENT — PAIN DESCRIPTION - DESCRIPTORS: DESCRIPTORS: STABBING

## 2019-08-07 ASSESSMENT — PAIN DESCRIPTION - PAIN TYPE: TYPE: ACUTE PAIN

## 2019-08-07 ASSESSMENT — PAIN DESCRIPTION - FREQUENCY: FREQUENCY: CONTINUOUS

## 2019-08-07 ASSESSMENT — PAIN DESCRIPTION - PROGRESSION: CLINICAL_PROGRESSION: NOT CHANGED

## 2019-08-07 NOTE — CONSULTS
Take 1 tablet by mouth 2 times daily 4/16/19  Yes Sarah Maharaj MD   furosemide (LASIX) 20 MG tablet Take 1 tablet by mouth as needed (take as needed for weight gain)  Patient taking differently: Take 40 mg by mouth as needed (take as needed for weight gain)  2/12/19  Yes Sarah Maharaj MD   acetaminophen (TYLENOL) 500 MG tablet Take 500 mg by mouth every 6 hours as needed for Pain Max 4,000mg total acetaminophen per 24 hours. Yes Historical Provider, MD   Biotin 5000 MCG TABS Take 1 tablet by mouth daily   Yes Historical Provider, MD   vitamin D (CHOLECALCIFEROL) 5000 units CAPS capsule Take 5,000 Units by mouth daily   Yes Historical Provider, MD   Vitamin A 8000 units TABS Take 1 tablet by mouth daily   Yes Historical Provider, MD   vitamin C (ASCORBIC ACID) 500 MG tablet Take 500 mg by mouth daily   Yes Historical Provider, MD   atorvastatin (LIPITOR) 10 MG tablet Take 1 tablet by mouth nightly 12/12/18  Yes AIMEE Wilkerson - CNP   ranitidine (ZANTAC) 150 MG tablet Take 150 mg by mouth 2 times daily   Yes Historical Provider, MD   latanoprost (XALATAN) 0.005 % ophthalmic solution Place 1 drop into both eyes nightly   Yes Historical Provider, MD   gabapentin (NEURONTIN) 800 MG tablet Take 800 mg by mouth 3 times daily. .   Yes Historical Provider, MD   albuterol-ipratropium (COMBIVENT RESPIMAT)  MCG/ACT AERS inhaler Inhale 1 puff into the lungs every 6 hours as needed for Wheezing or Shortness of Breath   Yes Historical Provider, MD   magnesium (MAGNESIUM-OXIDE) 250 MG TABS tablet Take 250 mg by mouth daily    Historical Provider, MD   vitamin B-12 (CYANOCOBALAMIN) 1000 MCG tablet Take 1,000 mcg by mouth daily    Historical Provider, MD   Cranberry-Vitamin C (CRANBERRY CONCENTRATE/VITAMINC PO) Take 2 capsules by mouth daily 25,200mg  With vit C 40mg    Historical Provider, MD   Multiple Vitamins-Minerals (CENTRUM SILVER PO) Take 1 tablet by mouth daily    Historical Provider, MD   hydrocortisone disturbance, no joint complaints. · Integumentary: No rash or pruritis. · Neurological: No headache, diplopia, change in muscle strength, numbness or tingling. · Psychiatric: No anxiety or depression. · Endocrine: No temperature intolerance. No excessive thirst, fluid intake, or urination. No tremor. · Hematologic/Lymphatic: No abnormal bruising or bleeding, blood clots or swollen lymph nodes. · Allergic/Immunologic: No nasal congestion or hives. Objective:     PHYSICAL EXAM:      Vitals:    08/07/19 1123   BP: (!) 90/58   Pulse: 81   Resp: 18   Temp: 97.7 °F (36.5 °C)   SpO2: 93%    Weight: 272 lb 11.3 oz (123.7 kg)       General Appearance:  Alert, cooperative, no distress, appears stated age. Head:  Normocephalic, without obvious abnormality, atraumatic. Eyes:  Pupils equal and round. No scleral icterus. Mouth: Moist mucosa, no pharyngeal erythema. Nose: Nares normal. No drainage or sinus tenderness. Neck: Supple, symmetrical, trachea midline. No adenopathy. No tenderness/mass/nodules. No carotid bruit or elevated JVD. Lungs:   Respiratory Effort: Normal   Auscultation: Clear to auscultation bilaterally, respirations unlabored. No wheeze, rales   Chest Wall:  No tenderness or deformity. Cardiovascular:    Pulses  Palpation: normal   Ascultation: Regular rate, S1/ S2 normal. No murmur, rub, or gallop. 2+ radial and pedal pulses, symmetric  Carotid  Femoral   Abdomen and Gastrointestinal:   Soft, non-tender, bowel sounds active. Liver and Spleen  Masses   Musculoskeletal: No muscle wasting  Back  Gait   Extremities: Extremities normal, atraumatic. No cyanosis or edema. No cyanosis clubbing       Skin: Inspection and palpation performed, no rashes or lesions. Pysch: Normal mood and affect.  Alert and oriented to time place person   Neurologic: Normal gross motor and sensory exam.       Labs     All labs have been reviewed    Lab Results   Component Value Date    WBC 9.3 08/07/2019    RBC

## 2019-08-07 NOTE — DISCHARGE INSTR - COC
 Essential hypertension I10    Chronic left systolic heart failure (HCC) I50.22    Acute exacerbation of chronic obstructive pulmonary disease (COPD) ([facility]) J44.1    Nonischemic cardiomyopathy (HCC) I42.8    Acute pulmonary embolism without acute cor pulmonale (HCC) I26.99    Myocardiopathy ([facility]) I42.9       Isolation/Infection:   Isolation          No Isolation            Nurse Assessment:  Last Vital Signs: BP (!) 89/56   Pulse 80   Temp 97.5 °F (36.4 °C) (Oral)   Resp 16   Ht 5' 6\" (1.676 m)   Wt 272 lb 11.3 oz (123.7 kg)   SpO2 99%   BMI 44.02 kg/m²     Last documented pain score (0-10 scale): Pain Level: 0  Last Weight:   Wt Readings from Last 1 Encounters:   08/07/19 272 lb 11.3 oz (123.7 kg)     Mental Status:  {IP PT MENTAL STATUS:20030}    IV Access:  { [facility] IV ACCESS:849672968}    Nursing Mobility/ADLs:  Walking   {CHP DME ZIRY:099173058}  Transfer  {CHP DME EKPH:424144295}  Bathing  {CHP DME WRWW:376162485}  Dressing  {CHP DME AISY:273655226}  Toileting  {CHP DME YKBF:904462430}  Feeding  {CHP DME UYOH:329680205}  Med Admin  {P DME PXSU:226029478}  Med Delivery   { [facility] MED Delivery:720595763}    Wound Care Documentation and Therapy:        Elimination:  Continence:   · Bowel: {YES / FL:31450}  · Bladder: {YES / AY:88402}  Urinary Catheter: {Urinary Catheter:925486516}   Colostomy/Ileostomy/Ileal Conduit: {YES / AF:95754}       Date of Last BM: ***    Intake/Output Summary (Last 24 hours) at 8/7/2019 1613  Last data filed at 8/7/2019 1400  Gross per 24 hour   Intake 481.42 ml   Output 300 ml   Net 181.42 ml     I/O last 3 completed shifts:   In: 481.4 [P.O.:360; I.V.:121.4]  Out: 300 [Urine:300]    Safety Concerns:     { [facility] Safety Concerns:741350293}    Impairments/Disabilities:      {[facility] Impairments/Disabilities:481442554}    Nutrition Therapy:  Low salt diet, keep total fluids less than 48 to 64 ounces a day   Current Nutrition Therapy:   Lobo8 Mimi Boyce [facility] Diet List:801944417}    Routes of

## 2019-08-07 NOTE — H&P
Date Taking? Authorizing Provider   carvedilol (COREG) 25 MG tablet Take 0.5 tablets by mouth 2 times daily (with meals) Hold for SBP<100 7/31/19  Yes Vladimir Johnson MD   sacubitril-valsartan (ENTRESTO)  MG per tablet Take 1 tablet by mouth 2 times daily 4/16/19  Yes Mayte Santos MD   furosemide (LASIX) 20 MG tablet Take 1 tablet by mouth as needed (take as needed for weight gain)  Patient taking differently: Take 40 mg by mouth as needed (take as needed for weight gain)  2/12/19  Yes Mayte Santos MD   acetaminophen (TYLENOL) 500 MG tablet Take 500 mg by mouth every 6 hours as needed for Pain Max 4,000mg total acetaminophen per 24 hours. Yes Historical Provider, MD   Biotin 5000 MCG TABS Take 1 tablet by mouth daily   Yes Historical Provider, MD   vitamin D (CHOLECALCIFEROL) 5000 units CAPS capsule Take 5,000 Units by mouth daily   Yes Historical Provider, MD   Vitamin A 8000 units TABS Take 1 tablet by mouth daily   Yes Historical Provider, MD   vitamin C (ASCORBIC ACID) 500 MG tablet Take 500 mg by mouth daily   Yes Historical Provider, MD   atorvastatin (LIPITOR) 10 MG tablet Take 1 tablet by mouth nightly 12/12/18  Yes AIMEE Escobedo - CNP   ranitidine (ZANTAC) 150 MG tablet Take 150 mg by mouth 2 times daily   Yes Historical Provider, MD   latanoprost (XALATAN) 0.005 % ophthalmic solution Place 1 drop into both eyes nightly   Yes Historical Provider, MD   gabapentin (NEURONTIN) 800 MG tablet Take 800 mg by mouth 3 times daily. .   Yes Historical Provider, MD   albuterol-ipratropium (COMBIVENT RESPIMAT)  MCG/ACT AERS inhaler Inhale 1 puff into the lungs every 6 hours as needed for Wheezing or Shortness of Breath   Yes Historical Provider, MD   magnesium (MAGNESIUM-OXIDE) 250 MG TABS tablet Take 250 mg by mouth daily    Historical Provider, MD   vitamin B-12 (CYANOCOBALAMIN) 1000 MCG tablet Take 1,000 mcg by mouth daily    Historical Provider, MD   Cranberry-Vitamin C (CRANBERRY
L2ELCABO, PHART, THGBART, SHD1GZW, PO2ART, XSD1IWH        Active Hospital Problems    Diagnosis Date Noted    Bilateral pulmonary embolism ([facility] [I26.99] 08/07/2019    Acute pulmonary embolism without acute cor pulmonale (HCC) [I26.99]     [facility] [I42.9]          PHYSICIANS CERTIFICATION:    I certify that Luis Ceja is expected to be hospitalized for more than 2 midnights based on the following assessment and plan:      ASSESSMENT/PLAN:    Acute pulmonary embolism:   Patient has an unprovoked PE. Was started on heparin drip. Discussed p.o. options and patient would like to be on Xarelto. Will check right side echocardiogram.  Troponin is negative. BNP is above baseline. Cardiology were contacted by the admitting team: Appreciate their input      Chest pain:   Secondary to above  Continue symptom management    Chronic systolic heart failure:   Weight is at baseline. Looks compensated. Continue with beta-blocker and Entresto. Lasix as needed for fluid retention  Low-salt diet and fluid restriction    Hypotension   Asymptomatic  Continue to monitor    High anion gap metabolic acidosis  Check lactic acid  Repeat chemistry in the a.m. DVT Prophylaxis: Heparin   Diet: DIET LOW SODIUM 2 GM; 1800 ml  Code Status: Full Code    Dispo -   Pending clinical improvement. Marti Godinez MD    Thank you [facility] for the opportunity to be involved in this patient's care.

## 2019-08-07 NOTE — PLAN OF CARE
Problem: Falls - Risk of:  Goal: Will remain free from falls  Description Fall risk assessment completed every shift. All precautions in place. Pt has call light within reach at all times. Room clear of clutter. Pt aware to call for assistance when getting up. Bed locked in lowest position, alarm engaged, call light within reach, will continue to monitor. Problem: Daily Care:  Goal: Daily care needs are met  Description Patient's ability assessed to perform self care and independent activity encouraged according to that ability. Assisted with ADL's as needed. Risk for skin breakdown assessed. Potential discharge needs assessed. Patient and family included in daily care decisions. Problem: Pain:  Goal: Patient's pain/discomfort is manageable  Description Pain/discomfort being managed with PRN analgesics per MD orders. Pt able to express presence and absence of pain and rate pain appropriately using numerical scale. Problem: HEMODYNAMIC STATUS  Goal: Patient has stable vital signs and fluid balance  Outcome: Ongoing     Patient's pain/discomfort is manageable Vitals completed q4h and assessed by RN. I&O charted and assessed per MD order. Pt tolerating adequate fluid intake. Problem: FLUID AND ELECTROLYTE IMBALANCE  Goal: Fluid and electrolyte balance are achieved/maintained  Outcome: Ongoing  Pt electrolytes drawn and assessed per MD order. Replaced as needed per orders. I&O charted and assessed.   Pt tolerating adequate fluid intake

## 2019-08-08 VITALS
SYSTOLIC BLOOD PRESSURE: 93 MMHG | RESPIRATION RATE: 20 BRPM | BODY MASS INDEX: 44.29 KG/M2 | DIASTOLIC BLOOD PRESSURE: 66 MMHG | HEIGHT: 66 IN | HEART RATE: 69 BPM | TEMPERATURE: 97.7 F | OXYGEN SATURATION: 93 % | WEIGHT: 275.57 LBS

## 2019-08-08 LAB
ANION GAP SERPL CALCULATED.3IONS-SCNC: 10 MMOL/L (ref 3–16)
BACTERIA: ABNORMAL /HPF
BASOPHILS ABSOLUTE: 0 K/UL (ref 0–0.2)
BASOPHILS RELATIVE PERCENT: 0.3 %
BILIRUBIN URINE: NEGATIVE
BLOOD, URINE: ABNORMAL
BUN BLDV-MCNC: 16 MG/DL (ref 7–20)
CALCIUM SERPL-MCNC: 8.9 MG/DL (ref 8.3–10.6)
CHLORIDE BLD-SCNC: 105 MMOL/L (ref 99–110)
CLARITY: ABNORMAL
CO2: 26 MMOL/L (ref 21–32)
COLOR: YELLOW
CREAT SERPL-MCNC: 1 MG/DL (ref 0.6–1.2)
EOSINOPHILS ABSOLUTE: 0.1 K/UL (ref 0–0.6)
EOSINOPHILS RELATIVE PERCENT: 0.5 %
EPITHELIAL CELLS, UA: 7 /HPF (ref 0–5)
GFR AFRICAN AMERICAN: >60
GFR NON-AFRICAN AMERICAN: 55
GLUCOSE BLD-MCNC: 108 MG/DL (ref 70–99)
GLUCOSE URINE: NEGATIVE MG/DL
HCT VFR BLD CALC: 37.1 % (ref 36–48)
HEMOGLOBIN: 12.3 G/DL (ref 12–16)
HYALINE CASTS: 2 /LPF (ref 0–8)
KETONES, URINE: NEGATIVE MG/DL
LACTIC ACID: 1.3 MMOL/L (ref 0.4–2)
LEUKOCYTE ESTERASE, URINE: NEGATIVE
LYMPHOCYTES ABSOLUTE: 3.3 K/UL (ref 1–5.1)
LYMPHOCYTES RELATIVE PERCENT: 25.4 %
MCH RBC QN AUTO: 32.7 PG (ref 26–34)
MCHC RBC AUTO-ENTMCNC: 33.1 G/DL (ref 31–36)
MCV RBC AUTO: 98.9 FL (ref 80–100)
MICROSCOPIC EXAMINATION: YES
MONOCYTES ABSOLUTE: 0.8 K/UL (ref 0–1.3)
MONOCYTES RELATIVE PERCENT: 5.9 %
NEUTROPHILS ABSOLUTE: 8.9 K/UL (ref 1.7–7.7)
NEUTROPHILS RELATIVE PERCENT: 67.9 %
NITRITE, URINE: NEGATIVE
PDW BLD-RTO: 14.3 % (ref 12.4–15.4)
PH UA: 6 (ref 5–8)
PLATELET # BLD: 280 K/UL (ref 135–450)
PMV BLD AUTO: 7.3 FL (ref 5–10.5)
POTASSIUM REFLEX MAGNESIUM: 4.6 MMOL/L (ref 3.5–5.1)
PRO-BNP: 1112 PG/ML (ref 0–124)
PROTEIN UA: NEGATIVE MG/DL
RBC # BLD: 3.75 M/UL (ref 4–5.2)
RBC UA: 5 /HPF (ref 0–4)
SODIUM BLD-SCNC: 141 MMOL/L (ref 136–145)
SPECIFIC GRAVITY UA: 1.02 (ref 1–1.03)
URINE REFLEX TO CULTURE: ABNORMAL
URINE TYPE: ABNORMAL
UROBILINOGEN, URINE: 1 E.U./DL
WBC # BLD: 13.1 K/UL (ref 4–11)
WBC UA: 3 /HPF (ref 0–5)

## 2019-08-08 PROCEDURE — 6370000000 HC RX 637 (ALT 250 FOR IP): Performed by: INTERNAL MEDICINE

## 2019-08-08 PROCEDURE — 83605 ASSAY OF LACTIC ACID: CPT

## 2019-08-08 PROCEDURE — 80048 BASIC METABOLIC PNL TOTAL CA: CPT

## 2019-08-08 PROCEDURE — 85025 COMPLETE CBC W/AUTO DIFF WBC: CPT

## 2019-08-08 PROCEDURE — 81001 URINALYSIS AUTO W/SCOPE: CPT

## 2019-08-08 PROCEDURE — 36415 COLL VENOUS BLD VENIPUNCTURE: CPT

## 2019-08-08 PROCEDURE — 99232 SBSQ HOSP IP/OBS MODERATE 35: CPT | Performed by: NURSE PRACTITIONER

## 2019-08-08 PROCEDURE — 2580000003 HC RX 258: Performed by: INTERNAL MEDICINE

## 2019-08-08 PROCEDURE — 83880 ASSAY OF NATRIURETIC PEPTIDE: CPT

## 2019-08-08 PROCEDURE — 94760 N-INVAS EAR/PLS OXIMETRY 1: CPT

## 2019-08-08 PROCEDURE — 93308 TTE F-UP OR LMTD: CPT

## 2019-08-08 RX ADMIN — GABAPENTIN 800 MG: 400 CAPSULE ORAL at 14:57

## 2019-08-08 RX ADMIN — GABAPENTIN 800 MG: 400 CAPSULE ORAL at 09:17

## 2019-08-08 RX ADMIN — APIXABAN 10 MG: 5 TABLET, FILM COATED ORAL at 09:17

## 2019-08-08 RX ADMIN — SACUBITRIL AND VALSARTAN 1 TABLET: 97; 103 TABLET, FILM COATED ORAL at 09:16

## 2019-08-08 RX ADMIN — Medication 10 ML: at 09:18

## 2019-08-08 ASSESSMENT — PAIN SCALES - GENERAL: PAINLEVEL_OUTOF10: 0

## 2019-08-08 NOTE — DISCHARGE SUMMARY
Improved     Discharge Medications:   Discharge Medication List as of 8/8/2019  2:36 PM      START taking these medications    Details   rivaroxaban (XARELTO STARTER PACK) 15 & 20 MG Starter Pack 15 mg BID for 21 days then 20 mg daily with food, Disp-1 Package, R-0Normal           Discharge Medication List as of 8/8/2019  2:36 PM        Discharge Medication List as of 8/8/2019  2:36 PM      CONTINUE these medications which have NOT CHANGED    Details   carvedilol (COREG) 25 MG tablet Take 0.5 tablets by mouth 2 times daily (with meals) Hold for SBP<100, Disp-180 tablet, R-0**Patient requests 90 days supply**Normal      sacubitril-valsartan (ENTRESTO)  MG per tablet Take 1 tablet by mouth 2 times daily, Disp-60 tablet, R-5Normal      furosemide (LASIX) 20 MG tablet Take 1 tablet by mouth as needed (take as needed for weight gain), Disp-90 tablet, R-3**Patient requests 90 days supply**NO PRINT      acetaminophen (TYLENOL) 500 MG tablet Take 500 mg by mouth every 6 hours as needed for Pain Max 4,000mg total acetaminophen per 24 hours. Historical Med      Biotin 5000 MCG TABS Take 1 tablet by mouth dailyHistorical Med      vitamin D (CHOLECALCIFEROL) 5000 units CAPS capsule Take 5,000 Units by mouth dailyHistorical Med      Vitamin A 8000 units TABS Take 1 tablet by mouth dailyHistorical Med      vitamin C (ASCORBIC ACID) 500 MG tablet Take 500 mg by mouth dailyHistorical Med      atorvastatin (LIPITOR) 10 MG tablet Take 1 tablet by mouth nightly, Disp-90 tablet, R-2Normal      ranitidine (ZANTAC) 150 MG tablet Take 150 mg by mouth 2 times dailyHistorical Med      latanoprost (XALATAN) 0.005 % ophthalmic solution Place 1 drop into both eyes nightlyHistorical Med      gabapentin (NEURONTIN) 800 MG tablet Take 800 mg by mouth 3 times daily. Marco A Workman Historical Med      albuterol-ipratropium (COMBIVENT RESPIMAT)  MCG/ACT AERS inhaler Inhale 1 puff into the lungs every 6 hours as needed for Wheezing or Shortness of Acid 1.3 0.4 - 2.0 mmol/L   Urinalysis Reflex to Culture    Collection Time: 08/08/19 10:33 AM   Result Value Ref Range    Color, UA YELLOW Straw/Yellow    Clarity, UA CLOUDY (A) Clear    Glucose, Ur Negative Negative mg/dL    Bilirubin Urine Negative Negative    Ketones, Urine Negative Negative mg/dL    Specific Gravity, UA 1.019 1.005 - 1.030    Blood, Urine SMALL (A) Negative    pH, UA 6.0 5.0 - 8.0    Protein, UA Negative Negative mg/dL    Urobilinogen, Urine 1.0 <2.0 E.U./dL    Nitrite, Urine Negative Negative    Leukocyte Esterase, Urine Negative Negative    Microscopic Examination YES     Urine Reflex to Culture Not Indicated     Urine Type Clean catch    Microscopic Urinalysis    Collection Time: 08/08/19 10:33 AM   Result Value Ref Range    Bacteria, UA 1+ (A) /HPF    Hyaline Casts, UA 2 0 - 8 /LPF    WBC, UA 3 0 - 5 /HPF    RBC, UA 5 (H) 0 - 4 /HPF    Epi Cells 7 (H) 0 - 5 /HPF         Follow up: with CAROL SANON    Note that over 30 minutes was spent in preparing discharge papers, discussing discharge with patient, medication review, etc.    Thank you Gisele Stearns for the opportunity to be involved in this patient's care. If you have any questions or concerns please feel free to contact me at [phone].     Electronically signed by Parviz Gonzalez MD on 8/8/2019 at 4:53 PM

## 2019-08-08 NOTE — PROGRESS NOTES
Clinical Pharmacy Note  Medication Counseling    Reviewed new medications started during hospital admission: Xarelto. Indications and side effects were emphasized during counseling. All medication-related questions addressed. Patient verbalized understanding of education. Should the patient express any additional questions or concerns regarding their medications, please do not hesitate to contact the pharmacy department. Patient/caregiver aware they may refuse medications during hospital stay. 10 minutes spent educating patient regarding medications.   Renuka Smith RPh 8/8/2019 1:20 PM
Clinical Pharmacy Note  Medication Counseling    Reviewed new medications started during hospital admission: eliquis, , . Indications and side effects were emphasized during counseling. All medication-related questions addressed. Patient verbalized understanding of education. Should the patient express any additional questions or concerns regarding their medications, please do not hesitate to contact the pharmacy department. Patient/caregiver aware they may refuse medications during hospital stay. 10 minutes spent educating patient regarding medications.
Discharge instructions explained to pt and copy given. Pt verbalized understanding and is in agreement with discharge plan to home. Peripheral IV removed without complication and dry dsg applied to insertion site. Pt to be transported home via private car with sister at bedside.   Transport notified of need for wheelchair for discharge/assist to car
PA Student Progress Note    CC:     Chest Pain (Right sided chest pain)      Admit date: 8/7/2019  Days in hospital:  1    Subjective: Patient is doing well. Just returned from getting her echo. Denies any pain or complaints at this time. Would like to switch to Xarelto instead of Eliquis due to her twin sister being on Xarelto. ROS:   Review of Systems   Constitutional: Negative for chills and fever. Respiratory: Negative for cough and shortness of breath. Cardiovascular: Negative for chest pain and palpitations. Gastrointestinal: Negative for abdominal pain, constipation and diarrhea. Genitourinary: Negative for dysuria and urgency. Neurological: Negative for headaches. Objective:    BP 88/68   Pulse 82   Temp 98.6 °F (37 °C) (Oral)   Resp 20   Ht 5' 6\" (1.676 m)   Wt 275 lb 9.2 oz (125 kg)   SpO2 97%   BMI 44.48 kg/m²     Gen: alert, NAD  HEENT: NC/AT, moist mucous membranes, no oropharyngeal erythema or exudate  Neck: supple, trachea midline, no anterior cervical or SC LAD  Heart: Normal s1/s2, RRR, no murmurs, gallops, or rubs. Lungs: Clear but diminshed bilaterally, no wheezing, no rales, no rhonchi, no use of accessory muscles  Abd: bowel sounds present, soft, nondistended, non tender  Extrem: No clubbing, cyanosis, no edema  Skin: Warm, dry  Psych: A & O x3, affect appropriate  Neuro: grossly intact, moves all four extremities spontaneously.       Medications:  Scheduled Meds:   sodium chloride flush  10 mL Intravenous 2 times per day    atorvastatin  10 mg Oral Nightly    carvedilol  12.5 mg Oral BID     gabapentin  800 mg Oral TID    latanoprost  1 drop Both Eyes Nightly    sacubitril-valsartan  1 tablet Oral BID    apixaban  10 mg Oral BID    [START ON 8/14/2019] apixaban  5 mg Oral BID       PRN Meds:  sodium chloride flush, magnesium hydroxide, ondansetron, albuterol sulfate HFA, ipratropium, acetaminophen, perflutren lipid
and   apex of the left ventricle. Indeterminate diastolic function due to fusion   of the mitral E and A waves.   Normal right ventricular size and function. TAPSE measures 21mm.   Tricuspid valve is structurally normal.   Moderate-to-severe tricuspid regurgitation.   No evidence of tricuspid stenosis. [address] not well visualized.   Estimated pulmonary artery systolic pressure is normal at 40 mmHg assuming a   right atrial pressure of 3 mmHg. Stress Test: None    Cardiac Angiography:   4. Cath: 3/31/2004  ANGIOGRAPHY:  1. The left main coronary artery was angiographically normal.   2.  The left anterior descending artery is widely patent, tapers relatively quickly, but is without significant disease. 3.  The circumflex coronary artery is a dominant vessel and is angiographically normal.  4.  The right coronary artery is small, technically nondominant, and is angiographically normal.  5.  A left ventriculogram shows mild diffuse hypokinesis. LVEF is estimated at 45%. There is no mitral insufficiency. There is no gradient on pullback across the aortic valve. CONCLUSIONS:  1. Essentially normal epicardial coronary artery. 2.  Mild diffuse hypokinesis of the left ventricle with mild     left ventricular systolic dysfunction. The MCOT, echocardiogram, stress test, and coronary angiography/PCI were reviewed by myself and used for my plan of care  Chest CTA: 8/7/2019  Impression   1. Right upper and right lower lobe pulmonary emboli without evidence for   right ventricular strain. 2. Small right pleural effusion. Assessment/Plan:  1.) Chronic SHFrEF 20-25%: Stable  NYHA Class II-III  Diuretic: lasix 40mg PRN  Beta Blocker: coreg 12.5mg BID, hold for SBP <100  ACEi/ARB/ARNI: entresto 97/100 BID  Aldosterone Antagonist: None  2gm Na diet, daily weight, 64 oz fluid restriction      2.) Acute right pulm embolism without pulm strain (ECHO today with normal RV size and function): Stable without CP ro COB.

## 2019-08-09 ENCOUNTER — SCHEDULED TELEPHONE ENCOUNTER (OUTPATIENT)
Dept: PHARMACY | Age: 69
End: 2019-08-09

## 2019-08-09 NOTE — ED PROVIDER NOTES
Attending Supervisory Note/Shared Visit   I have personally performed a face to face diagnostic evaluation on this patient. I have reviewed the mid-levels findings and agree. History and Exam by me shows a well-appearing female no acute distress. Patient presented to the ED for evaluation of left-sided chest pain. Patient had recently been admitted to the hospital for a COPD exacerbation and CHF exacerbation. Time my evaluation the patient is resting comfortably and states that she felt well. Patient's vital signs are within normal limits. Lungs had bilateral rales. Patient had primary edema to the lower extremities. work-up was remarkable for an elevated BNP as well as a d-dimer. CT PE study showed bilateral pulmonary emboli. He was started on Prandin and admitted to the hospital for further medical management. I agree with the MITCHELL plan of care. Please MITCHELL Note for full ED course and final disposition      Disposition:  1.  Bilateral pulmonary embolism (HCC)          Sky Sanon MD  Attending Emergency Physician        Sky Sanon MD  08/09/19 9238
mg (650 mg Oral Given 8/7/19 0427)   predniSONE (DELTASONE) tablet 60 mg (60 mg Oral Given 8/7/19 0427)   diphenhydrAMINE (BENADRYL) injection 12.5 mg (12.5 mg Intravenous Given 8/7/19 0427)   iopamidol (ISOVUE-370) 76 % injection 75 mL (75 mLs Intravenous Given 8/7/19 0509)   heparin (porcine) injection 6,900 Units (6,900 Units Intravenous Given 8/7/19 0610)       Differential Diagnosis: Acute Coronary Syndrome, Congestive Heart Failure, Myocardial Infarction, Pulmonary Embolus, Pneumonia, Pneumothorax, other    Patient presents with chest pain shortness of breath and cough. See HPI for full presentation. Obese 69-year-old female lying in bed in no acute distress. Diagnostic work-up was ordered. As this is the end of my shift the attending physician will continue care of this patient. Mario HOPE Siffel was signed out in stable condition. Please see Nino Fernandes MD note for further details, including diagnosis and disposition. FINAL IMPRESSION      1.  Bilateral pulmonary embolism [facility])          DISPOSITION/PLAN   DISPOSITION        PATIENT REFERREDTO:  Hensley Bayamon  [address] [place] [address]  [phone]            DISCHARGE MEDICATIONS:  Current Discharge Medication List          DISCONTINUED MEDICATIONS:  Current Discharge Medication List                 (Please note that portions ofthis note were completed with a voice recognition program.  Efforts were made to edit the dictations but occasionally words are mis-transcribed.)    AIMEE Aaron CNP (electronically signed)            AIMEE Aaron CNP  08/07/19 9984

## 2019-08-09 NOTE — TELEPHONE ENCOUNTER
[facility]  Heart Failure Service  [phone]     [facility]  MED[facility] RECORD NUMBER:  [id number]. Follow up phone call:    Are you having any issues that need immediate attention? No     Do you have any signs or symptoms of fluid retention? No              []  Shortness of breath              []  Swelling of hands, feet, ankles, or lower legs              []  Abdominal fullness              []  Cough, especially at night or when you lie down              []  Fatigue that limits activity     Do you have any other signs or symptoms to report? No              []  Dizziness              []  Light headedness, like you are going to pass out              []  Headache                                                                                []  Other    Wt Readings from Last 6 Encounters:   08/08/19 275 lb 9.2 oz (125 kg)   07/31/19 277 lb 9 oz (125.9 kg)   06/05/19 270 lb (122.5 kg)   05/21/19 275 lb (124.7 kg)   05/14/19 274 lb (124.3 kg)   05/09/19 276 lb (125.2 kg)         Do you have a working scale? Yes     Have you been checking your weight daily? Yes              If not, the patient was encouraged to do so. Dry weight = 275 at discharge   What is your weight today? 275     With regards to your weight, when would you call the doctor? [x] increased by 3 pounds or more in one day               [x] 5 pounds or more in a week              [] weight above my dry weight              [] other                 [] unknown     Has your weight increased by 3 pounds or more in one day or 5 pounds or more in a week? No          Please call the [facility] at [phone] or your Cardiologist [facility] @ [phone]) if you have a weight gain of 3 pounds or more in one day or 5 pounds or more in a week or above your dry weight. Have you been admitted to the hospital or visited an emergency room recently?  Yes If yes, be sure to

## 2019-08-10 DIAGNOSIS — I10 ESSENTIAL HYPERTENSION: ICD-10-CM

## 2019-08-10 DIAGNOSIS — I50.22 CHRONIC SYSTOLIC HEART FAILURE (HCC): ICD-10-CM

## 2019-08-10 DIAGNOSIS — I42.9 CARDIOMYOPATHY, UNSPECIFIED TYPE (HCC): ICD-10-CM

## 2019-08-10 DIAGNOSIS — I73.9 PAD (PERIPHERAL ARTERY DISEASE) (HCC): ICD-10-CM

## 2019-08-12 NOTE — PROGRESS NOTES
Hypertension Sister        HomeMedications:  Prior to Admission medications    Medication Sig Start Date End Date Taking? Authorizing Provider   rivaroxaban (XARELTO STARTER PACK) 15 & 20 MG Starter Pack 15 mg BID for 21 days then 20 mg daily with food 8/8/19  Yes Shayla Frazier MD   carvedilol (COREG) 25 MG tablet Take 0.5 tablets by mouth 2 times daily (with meals) Hold for SBP<100 7/31/19  Yes Vladimir Johnson MD   sacubitril-valsartan (ENTRESTO)  MG per tablet Take 1 tablet by mouth 2 times daily 4/16/19  Yes Jabari Combs MD   furosemide (LASIX) 20 MG tablet Take 1 tablet by mouth as needed (take as needed for weight gain)  Patient taking differently: Take 40 mg by mouth as needed (take as needed for weight gain)  2/12/19  Yes Jabari Combs MD   acetaminophen (TYLENOL) 500 MG tablet Take 500 mg by mouth every 6 hours as needed for Pain Max 4,000mg total acetaminophen per 24 hours. Yes Historical Provider, MD   atorvastatin (LIPITOR) 10 MG tablet Take 1 tablet by mouth nightly 12/12/18  Yes Diana Ye, APRN - CNP   ranitidine (ZANTAC) 150 MG tablet Take 150 mg by mouth 2 times daily   Yes Historical Provider, MD   latanoprost (XALATAN) 0.005 % ophthalmic solution Place 1 drop into both eyes nightly   Yes Historical Provider, MD   gabapentin (NEURONTIN) 800 MG tablet Take 800 mg by mouth 3 times daily. .   Yes Historical Provider, MD   albuterol-ipratropium (COMBIVENT RESPIMAT)  MCG/ACT AERS inhaler Inhale 1 puff into the lungs every 6 hours as needed for Wheezing or Shortness of Breath   Yes Historical Provider, MD   hydrocortisone 2.5 % cream Apply 1 each topically 3 times daily as needed (itching/rash)    Yes Historical Provider, MD   magnesium (MAGNESIUM-OXIDE) 250 MG TABS tablet Take 250 mg by mouth daily    Historical Provider, MD   Biotin 5000 MCG TABS Take 1 tablet by mouth daily    Historical Provider, MD   vitamin D (CHOLECALCIFEROL) 5000 units CAPS capsule Take 5,000 Units

## 2019-08-13 ENCOUNTER — OFFICE VISIT (OUTPATIENT)
Dept: CARDIOLOGY CLINIC | Age: 69
End: 2019-08-13
Payer: MEDICARE

## 2019-08-13 VITALS
BODY MASS INDEX: 44.03 KG/M2 | WEIGHT: 274 LBS | DIASTOLIC BLOOD PRESSURE: 70 MMHG | HEIGHT: 66 IN | OXYGEN SATURATION: 97 % | SYSTOLIC BLOOD PRESSURE: 110 MMHG | HEART RATE: 97 BPM

## 2019-08-13 DIAGNOSIS — I47.29 NSVT (NONSUSTAINED VENTRICULAR TACHYCARDIA): ICD-10-CM

## 2019-08-13 DIAGNOSIS — F17.200 SMOKING ADDICTION: ICD-10-CM

## 2019-08-13 DIAGNOSIS — I10 ESSENTIAL HYPERTENSION: Primary | ICD-10-CM

## 2019-08-13 DIAGNOSIS — I50.22 CHRONIC LEFT SYSTOLIC HEART FAILURE (HCC): ICD-10-CM

## 2019-08-13 DIAGNOSIS — I26.90 ACUTE SEPTIC PULMONARY EMBOLISM WITHOUT ACUTE COR PULMONALE (HCC): ICD-10-CM

## 2019-08-13 PROCEDURE — 93000 ELECTROCARDIOGRAM COMPLETE: CPT | Performed by: NURSE PRACTITIONER

## 2019-08-13 PROCEDURE — 99213 OFFICE O/P EST LOW 20 MIN: CPT | Performed by: NURSE PRACTITIONER

## 2019-08-13 RX ORDER — ATORVASTATIN CALCIUM 10 MG/1
10 TABLET, FILM COATED ORAL NIGHTLY
Qty: 90 TABLET | Refills: 2 | Status: SHIPPED | OUTPATIENT
Start: 2019-08-13 | End: 2020-01-10 | Stop reason: SDUPTHER

## 2019-08-13 RX ORDER — FUROSEMIDE 40 MG/1
40 TABLET ORAL DAILY
Qty: 90 TABLET | Refills: 2 | Status: SHIPPED | OUTPATIENT
Start: 2019-08-13 | End: 2019-08-21 | Stop reason: SDUPTHER

## 2019-08-14 ENCOUNTER — TELEPHONE (OUTPATIENT)
Dept: CARDIOLOGY CLINIC | Age: 69
End: 2019-08-14

## 2019-08-14 DIAGNOSIS — I50.22 CHRONIC LEFT SYSTOLIC HEART FAILURE (HCC): Primary | ICD-10-CM

## 2019-08-16 NOTE — TELEPHONE ENCOUNTER
Called patient scheduled Bi V ICD placement. Pre procedure instructions given, patient verbalized understanding of instructions. BiV ICD   Procedure Date : 9/9/19  Arrival Time: 6:45 am  Procedure Time: 8:00 am    The morning of your procedure you will park in the hospital parking lot and report directly to the cath lab to check in.      Pre-Procedure Instructions   1. You will need to fast for at least 8 hours prior to procedure. 2. Hold your diuretic, Lasix (furosemide) the morning of procedure. 3. Do not use any lotions, creams or perfume the morning of procedure. 4. Pre-procedure lab work will need to be completed 5-7 days prior to procedure. 5. Please have a responsible adult to drive you home after procedure, you will stay overnight. 6. Cath lab will provide you with all post procedure instructions     1 week follow up in clinic with nurse for site check. 3 month follow up in office with Dr Cosme Reyes.

## 2019-08-21 ENCOUNTER — OFFICE VISIT (OUTPATIENT)
Dept: PHARMACY | Age: 69
End: 2019-08-21
Payer: MEDICARE

## 2019-08-21 DIAGNOSIS — I50.22 CHRONIC SYSTOLIC HEART FAILURE (HCC): Primary | ICD-10-CM

## 2019-08-21 PROCEDURE — 99213 OFFICE O/P EST LOW 20 MIN: CPT

## 2019-08-29 ENCOUNTER — TELEPHONE (OUTPATIENT)
Dept: CARDIOLOGY CLINIC | Age: 69
End: 2019-08-29

## 2019-08-29 DIAGNOSIS — I50.22 CHRONIC LEFT SYSTOLIC HEART FAILURE (HCC): ICD-10-CM

## 2019-08-29 LAB
ANION GAP SERPL CALCULATED.3IONS-SCNC: 14 MMOL/L (ref 3–16)
BUN BLDV-MCNC: 12 MG/DL (ref 7–20)
CALCIUM SERPL-MCNC: 9.3 MG/DL (ref 8.3–10.6)
CHLORIDE BLD-SCNC: 107 MMOL/L (ref 99–110)
CO2: 22 MMOL/L (ref 21–32)
CREAT SERPL-MCNC: 1 MG/DL (ref 0.6–1.2)
GFR AFRICAN AMERICAN: >60
GFR NON-AFRICAN AMERICAN: 55
GLUCOSE BLD-MCNC: 82 MG/DL (ref 70–99)
HCT VFR BLD CALC: 40.7 % (ref 36–48)
HEMOGLOBIN: 13.4 G/DL (ref 12–16)
MCH RBC QN AUTO: 32.2 PG (ref 26–34)
MCHC RBC AUTO-ENTMCNC: 32.9 G/DL (ref 31–36)
MCV RBC AUTO: 98 FL (ref 80–100)
PDW BLD-RTO: 14.8 % (ref 12.4–15.4)
PLATELET # BLD: 255 K/UL (ref 135–450)
PMV BLD AUTO: 8.2 FL (ref 5–10.5)
POTASSIUM SERPL-SCNC: 4.5 MMOL/L (ref 3.5–5.1)
RBC # BLD: 4.15 M/UL (ref 4–5.2)
SODIUM BLD-SCNC: 143 MMOL/L (ref 136–145)
WBC # BLD: 7.2 K/UL (ref 4–11)

## 2019-08-29 NOTE — TELEPHONE ENCOUNTER
Medication Samples    Medication:  Xarelto    Doseage of the medication:  20 mg tablets    How are you taking this medication (QD, BID, TID, QID, PRN):  Take 1 tablet by mouth daily with food     in the office or Mail to your home?  in office    Izzy Sagastume from [facility] provider) is the one who called in. I told Izzy Sagastume to let the patient know that we will call her when the samples are ready to be picked up. You can reach Jaime Umana when the samples are ready at [phone]. You can reach Izzy Sagastume from [facility] with any questions or concerns at [phone].

## 2019-09-03 RX ORDER — SACUBITRIL AND VALSARTAN 97; 103 MG/1; MG/1
TABLET, FILM COATED ORAL
Qty: 180 TABLET | Refills: 5 | Status: SHIPPED | OUTPATIENT
Start: 2019-09-03 | End: 2020-09-18 | Stop reason: SDUPTHER

## 2019-09-06 RX ORDER — SODIUM CHLORIDE 0.9 % (FLUSH) 0.9 %
10 SYRINGE (ML) INJECTION PRN
Status: CANCELLED | OUTPATIENT
Start: 2019-09-09

## 2019-09-06 RX ORDER — SODIUM CHLORIDE 9 MG/ML
INJECTION, SOLUTION INTRAVENOUS CONTINUOUS
Status: CANCELLED | OUTPATIENT
Start: 2019-09-09

## 2019-09-06 RX ORDER — SODIUM CHLORIDE 0.9 % (FLUSH) 0.9 %
10 SYRINGE (ML) INJECTION EVERY 12 HOURS SCHEDULED
Status: CANCELLED | OUTPATIENT
Start: 2019-09-09

## 2019-09-09 ENCOUNTER — HOSPITAL ENCOUNTER (OUTPATIENT)
Dept: CARDIAC CATH/INVASIVE PROCEDURES | Age: 69
Discharge: HOME | End: 2019-09-11
Attending: INTERNAL MEDICINE | Admitting: INTERNAL MEDICINE
Payer: MEDICARE

## 2019-09-09 ENCOUNTER — APPOINTMENT (OUTPATIENT)
Dept: GENERAL RADIOLOGY | Age: 69
End: 2019-09-09
Attending: INTERNAL MEDICINE
Payer: MEDICARE

## 2019-09-09 DIAGNOSIS — Z95.810 IMPLANTABLE CARDIOVERTER-DEFIBRILLATOR (ICD) IN SITU: ICD-10-CM

## 2019-09-09 DIAGNOSIS — I50.23 ACUTE ON CHRONIC SYSTOLIC HEART FAILURE, NYHA CLASS 3 (HCC): ICD-10-CM

## 2019-09-09 DIAGNOSIS — I42.8 NONISCHEMIC CARDIOMYOPATHY (HCC): ICD-10-CM

## 2019-09-09 DIAGNOSIS — Z95.810 BIVENTRICULAR ICD (IMPLANTABLE CARDIOVERTER-DEFIBRILLATOR) IN PLACE: Primary | ICD-10-CM

## 2019-09-09 PROCEDURE — C1894 INTRO/SHEATH, NON-LASER: HCPCS

## 2019-09-09 PROCEDURE — C1730 CATH, EP, 19 OR FEW ELECT: HCPCS

## 2019-09-09 PROCEDURE — 6360000002 HC RX W HCPCS

## 2019-09-09 PROCEDURE — C1882 AICD, OTHER THAN SING/DUAL: HCPCS

## 2019-09-09 PROCEDURE — C1900 LEAD, CORONARY VENOUS: HCPCS

## 2019-09-09 PROCEDURE — C1777 LEAD, AICD, ENDO SINGLE COIL: HCPCS

## 2019-09-09 PROCEDURE — 99152 MOD SED SAME PHYS/QHP 5/>YRS: CPT | Performed by: INTERNAL MEDICINE

## 2019-09-09 PROCEDURE — 33225 L VENTRIC PACING LEAD ADD-ON: CPT | Performed by: FAMILY MEDICINE

## 2019-09-09 PROCEDURE — 6360000004 HC RX CONTRAST MEDICATION: Performed by: INTERNAL MEDICINE

## 2019-09-09 PROCEDURE — 99152 MOD SED SAME PHYS/QHP 5/>YRS: CPT | Performed by: FAMILY MEDICINE

## 2019-09-09 PROCEDURE — 6370000000 HC RX 637 (ALT 250 FOR IP): Performed by: INTERNAL MEDICINE

## 2019-09-09 PROCEDURE — C1769 GUIDE WIRE: HCPCS

## 2019-09-09 PROCEDURE — C1898 LEAD, PMKR, OTHER THAN TRANS: HCPCS

## 2019-09-09 PROCEDURE — 33225 L VENTRIC PACING LEAD ADD-ON: CPT | Performed by: INTERNAL MEDICINE

## 2019-09-09 PROCEDURE — 99153 MOD SED SAME PHYS/QHP EA: CPT | Performed by: FAMILY MEDICINE

## 2019-09-09 PROCEDURE — 33249 INSJ/RPLCMT DEFIB W/LEAD(S): CPT | Performed by: FAMILY MEDICINE

## 2019-09-09 PROCEDURE — 71045 X-RAY EXAM CHEST 1 VIEW: CPT

## 2019-09-09 PROCEDURE — 2580000003 HC RX 258

## 2019-09-09 PROCEDURE — 2500000003 HC RX 250 WO HCPCS

## 2019-09-09 PROCEDURE — 93005 ELECTROCARDIOGRAM TRACING: CPT | Performed by: INTERNAL MEDICINE

## 2019-09-09 PROCEDURE — 94760 N-INVAS EAR/PLS OXIMETRY 1: CPT

## 2019-09-09 PROCEDURE — C1887 CATHETER, GUIDING: HCPCS

## 2019-09-09 PROCEDURE — 33249 INSJ/RPLCMT DEFIB W/LEAD(S): CPT | Performed by: INTERNAL MEDICINE

## 2019-09-09 RX ORDER — OXYCODONE HYDROCHLORIDE AND ACETAMINOPHEN 5; 325 MG/1; MG/1
2 TABLET ORAL EVERY 6 HOURS PRN
Status: DISCONTINUED | OUTPATIENT
Start: 2019-09-09 | End: 2019-09-11 | Stop reason: HOSPADM

## 2019-09-09 RX ORDER — CARVEDILOL 12.5 MG/1
12.5 TABLET ORAL 2 TIMES DAILY WITH MEALS
Status: DISCONTINUED | OUTPATIENT
Start: 2019-09-09 | End: 2019-09-11 | Stop reason: HOSPADM

## 2019-09-09 RX ORDER — FUROSEMIDE 20 MG/1
20 TABLET ORAL PRN
Status: DISCONTINUED | OUTPATIENT
Start: 2019-09-09 | End: 2019-09-11 | Stop reason: HOSPADM

## 2019-09-09 RX ORDER — ACETAMINOPHEN 500 MG
500 TABLET ORAL EVERY 6 HOURS PRN
Status: DISCONTINUED | OUTPATIENT
Start: 2019-09-09 | End: 2019-09-09

## 2019-09-09 RX ORDER — DIPHENHYDRAMINE HYDROCHLORIDE 50 MG/ML
50 INJECTION INTRAMUSCULAR; INTRAVENOUS ONCE
Status: DISCONTINUED | OUTPATIENT
Start: 2019-09-09 | End: 2019-09-11 | Stop reason: HOSPADM

## 2019-09-09 RX ORDER — ATORVASTATIN CALCIUM 10 MG/1
10 TABLET, FILM COATED ORAL NIGHTLY
Status: DISCONTINUED | OUTPATIENT
Start: 2019-09-09 | End: 2019-09-11 | Stop reason: HOSPADM

## 2019-09-09 RX ORDER — ALBUTEROL SULFATE 90 UG/1
2 AEROSOL, METERED RESPIRATORY (INHALATION) EVERY 6 HOURS PRN
Status: DISCONTINUED | OUTPATIENT
Start: 2019-09-09 | End: 2019-09-11 | Stop reason: HOSPADM

## 2019-09-09 RX ORDER — METHYLPREDNISOLONE SODIUM SUCCINATE 125 MG/2ML
125 INJECTION, POWDER, LYOPHILIZED, FOR SOLUTION INTRAMUSCULAR; INTRAVENOUS ONCE
Status: DISCONTINUED | OUTPATIENT
Start: 2019-09-09 | End: 2019-09-11 | Stop reason: HOSPADM

## 2019-09-09 RX ORDER — OXYCODONE HYDROCHLORIDE AND ACETAMINOPHEN 5; 325 MG/1; MG/1
1 TABLET ORAL EVERY 6 HOURS PRN
Status: DISCONTINUED | OUTPATIENT
Start: 2019-09-09 | End: 2019-09-11 | Stop reason: HOSPADM

## 2019-09-09 RX ORDER — GABAPENTIN 400 MG/1
800 CAPSULE ORAL 3 TIMES DAILY
Status: DISCONTINUED | OUTPATIENT
Start: 2019-09-09 | End: 2019-09-11 | Stop reason: HOSPADM

## 2019-09-09 RX ORDER — FAMOTIDINE 20 MG/1
20 TABLET, FILM COATED ORAL DAILY
Status: DISCONTINUED | OUTPATIENT
Start: 2019-09-09 | End: 2019-09-11 | Stop reason: HOSPADM

## 2019-09-09 RX ORDER — ACETAMINOPHEN 325 MG/1
650 TABLET ORAL EVERY 4 HOURS PRN
Status: DISCONTINUED | OUTPATIENT
Start: 2019-09-09 | End: 2019-09-10

## 2019-09-09 RX ADMIN — IOPAMIDOL 50 ML: 755 INJECTION, SOLUTION INTRAVENOUS at 10:34

## 2019-09-09 RX ADMIN — SACUBITRIL AND VALSARTAN 1 TABLET: 97; 103 TABLET, FILM COATED ORAL at 21:04

## 2019-09-09 RX ADMIN — FAMOTIDINE 20 MG: 20 TABLET, FILM COATED ORAL at 13:07

## 2019-09-09 RX ADMIN — ATORVASTATIN CALCIUM 10 MG: 10 TABLET, FILM COATED ORAL at 21:04

## 2019-09-09 RX ADMIN — GABAPENTIN 800 MG: 400 CAPSULE ORAL at 13:07

## 2019-09-09 RX ADMIN — OXYCODONE HYDROCHLORIDE AND ACETAMINOPHEN 1 TABLET: 5; 325 TABLET ORAL at 13:07

## 2019-09-09 RX ADMIN — GABAPENTIN 800 MG: 400 CAPSULE ORAL at 21:04

## 2019-09-09 ASSESSMENT — PAIN DESCRIPTION - LOCATION
LOCATION: OTHER (COMMENT)
LOCATION: INCISION
LOCATION: OTHER (COMMENT)

## 2019-09-09 ASSESSMENT — PAIN SCALES - GENERAL
PAINLEVEL_OUTOF10: 5
PAINLEVEL_OUTOF10: 8
PAINLEVEL_OUTOF10: 0
PAINLEVEL_OUTOF10: 3
PAINLEVEL_OUTOF10: 6
PAINLEVEL_OUTOF10: 2

## 2019-09-09 ASSESSMENT — PAIN DESCRIPTION - DESCRIPTORS
DESCRIPTORS: BURNING;CONSTANT;DISCOMFORT
DESCRIPTORS: BURNING;CONSTANT;DISCOMFORT
DESCRIPTORS: DULL

## 2019-09-09 ASSESSMENT — PAIN DESCRIPTION - DIRECTION
RADIATING_TOWARDS: NON-RADIATING
RADIATING_TOWARDS: NON-RADIATING

## 2019-09-09 ASSESSMENT — PAIN DESCRIPTION - ORIENTATION
ORIENTATION: LEFT

## 2019-09-09 ASSESSMENT — PAIN DESCRIPTION - ONSET
ONSET: ON-GOING

## 2019-09-09 ASSESSMENT — PAIN DESCRIPTION - PROGRESSION
CLINICAL_PROGRESSION: GRADUALLY IMPROVING
CLINICAL_PROGRESSION: GRADUALLY IMPROVING
CLINICAL_PROGRESSION: NOT CHANGED

## 2019-09-09 ASSESSMENT — PAIN DESCRIPTION - PAIN TYPE
TYPE: SURGICAL PAIN

## 2019-09-09 ASSESSMENT — PAIN - FUNCTIONAL ASSESSMENT
PAIN_FUNCTIONAL_ASSESSMENT: ACTIVITIES ARE NOT PREVENTED
PAIN_FUNCTIONAL_ASSESSMENT: PREVENTS OR INTERFERES WITH ALL ACTIVE AND SOME PASSIVE ACTIVITIES
PAIN_FUNCTIONAL_ASSESSMENT: PREVENTS OR INTERFERES WITH ALL ACTIVE AND SOME PASSIVE ACTIVITIES

## 2019-09-09 ASSESSMENT — PAIN DESCRIPTION - FREQUENCY
FREQUENCY: CONTINUOUS

## 2019-09-09 NOTE — FLOWSHEET NOTE
Patient transferred to [facility]. RN to RN handoff done at bedside. Site checked and remains soft and unremarkable. Pressure dressing in place. Receiving RN verbalized understanding.      Receiving RN: Tremayne Elaine  Cath Lab RN: Nadya Batista and Sylvester Velarde

## 2019-09-09 NOTE — PRE SEDATION
Sedation Pre-Procedure Note    Patient Name: Sushant Ceja   YOB: 1950  Room/Bed: Room/bed info not found  Medical Record Number: [id number]  Date: 9/9/2019   Time: 8:19 AM       Indication:  NICM, LVEF 20-25%, NYHA III, QRS 152ms LBBB on optimized medical therapy for more than 3 months    Consent: I have discussed with the patient and/or the patient representative the indication, alternatives, and the possible risks and/or complications of the planned procedure and the anesthesia methods. The patient and/or patient representative appear to understand and agree to proceed. Vital Signs:   Vitals:    09/09/19 0752   BP: 106/73   Pulse: 71   SpO2: 98%       Past Medical History:   has a past medical history of CHF (congestive heart failure) ([facility] [address] COPD (chronic obstructive pulmonary disease) ([facility] [address] and Hypertension. Past Surgical History:   has a past surgical history that includes Carpal tunnel release; Cholecystectomy; knee surgery; Tonsillectomy; and Tubal ligation. Medications:   Scheduled Meds:    methylPREDNISolone  125 mg Intravenous Once    diphenhydrAMINE  50 mg Intravenous Once     Continuous Infusions:   PRN Meds:   Home Meds:   Prior to Admission medications    Medication Sig Start Date End Date Taking? Authorizing Provider   ENTRESTO  MG per tablet TAKE 1 TABLET BY MOUTH TWICE DAILY 9/3/19  Yes Ligia Rubin MD   atorvastatin (LIPITOR) 10 MG tablet TAKE 1 TABLET BY MOUTH  NIGHTLY 8/13/19  Yes Shelley Chow, APRN - CNP   carvedilol (COREG) 25 MG tablet Take 0.5 tablets by mouth 2 times daily (with meals) Hold for SBP<100 7/31/19  Yes Vladimir Johnson MD   acetaminophen (TYLENOL) 500 MG tablet Take 500 mg by mouth every 6 hours as needed for Pain Max 4,000mg total acetaminophen per 24 hours.    Yes Historical Provider, MD   magnesium (MAGNESIUM-OXIDE) 250 MG TABS tablet Take 250 mg by mouth daily   Yes Historical Provider, MD   Biotin 5000 MCG TABS Take 1 tablet by mouth

## 2019-09-09 NOTE — H&P
waves.   Normal right ventricular size and function. TAPSE measures 21mm. Tricuspid valve is structurally normal.  Moderate-to-severe tricuspid regurgitation. No evidence of tricuspid stenosis. VC not well visualized.     Estimated pulmonary artery systolic pressure is normal at 40 mmHg assuming a right atrial pressure of 3 mmHg.     3. Stress Test:    None on file     4. Cath: 3/31/2004  ANGIOGRAPHY:  1. The left main coronary artery was angiographically normal.   2.  The left anterior descending artery is widely patent, tapers relatively quickly, but is without significant disease. 3.  The circumflex coronary artery is a dominant vessel and is angiographically normal.  4.  The right coronary artery is small, technically nondominant, and is angiographically normal.  5.  A left ventriculogram shows mild diffuse hypokinesis. LVEF is estimated at 45%. There is no mitral insufficiency. There is no gradient on pullback across the aortic valve. CONCLUSIONS:  1. Essentially normal epicardial coronary artery. 2.  Mild diffuse hypokinesis of the left ventricle with mild     left ventricular systolic dysfunction. The MCOT, echocardiogram, stress test, and coronary angiography/PCI were reviewed by myself and used for my plan of care. Procedures:  1. [facility] 3/31/2004     Assessment/Plan:      Chronic systolic heart failure  -Non-ischemic cardiomyopathy  -LVEF 20-25 % (ECHO) 5/16/19  -QRS duration 152ms with LBBB  -NYHA III  -On optimized medical therapy (Coreg, Entresto) for more than 3 months  -Recommend implantation of BiV ICD for primary prevention of SCD and heart failure management, as she meets a Class IIa indication for BiV ICD implantation.   -Much time was spent with the patient discussing the pathophysiology of the patient's cardiomyopathy, the risk of sudden cardiac death due to the cardiomyopathy, the utility of an implantable cardioverter defibrillator, the benefit and risks of the implantation of an

## 2019-09-09 NOTE — PLAN OF CARE
Pt c/o surgical pain to left subclavian pacermaker insertion site. Pt given emotional support, enc to rest, repositioned for comfort, and percocet prn given. Pt states pain interventions effective as evidenced by pain scores. New pacemaker/ICD in place and pt V-pacing.

## 2019-09-10 ENCOUNTER — APPOINTMENT (OUTPATIENT)
Dept: GENERAL RADIOLOGY | Age: 69
End: 2019-09-10
Attending: INTERNAL MEDICINE
Payer: MEDICARE

## 2019-09-10 LAB
EKG ATRIAL RATE: 85 BPM
EKG DIAGNOSIS: NORMAL
EKG P AXIS: 52 DEGREES
EKG P-R INTERVAL: 154 MS
EKG Q-T INTERVAL: 472 MS
EKG QRS DURATION: 192 MS
EKG QTC CALCULATION (BAZETT): 561 MS
EKG R AXIS: -48 DEGREES
EKG T AXIS: 76 DEGREES
EKG VENTRICULAR RATE: 85 BPM

## 2019-09-10 PROCEDURE — 33234 REMOVAL OF PACEMAKER SYSTEM: CPT | Performed by: INTERNAL MEDICINE

## 2019-09-10 PROCEDURE — 6360000002 HC RX W HCPCS: Performed by: NURSE PRACTITIONER

## 2019-09-10 PROCEDURE — 2580000003 HC RX 258

## 2019-09-10 PROCEDURE — C1894 INTRO/SHEATH, NON-LASER: HCPCS

## 2019-09-10 PROCEDURE — 71045 X-RAY EXAM CHEST 1 VIEW: CPT

## 2019-09-10 PROCEDURE — 6370000000 HC RX 637 (ALT 250 FOR IP): Performed by: INTERNAL MEDICINE

## 2019-09-10 PROCEDURE — 2500000003 HC RX 250 WO HCPCS

## 2019-09-10 PROCEDURE — C1887 CATHETER, GUIDING: HCPCS

## 2019-09-10 PROCEDURE — 99152 MOD SED SAME PHYS/QHP 5/>YRS: CPT | Performed by: FAMILY MEDICINE

## 2019-09-10 PROCEDURE — 33244 REMOVE ELCTRD TRANSVENOUSLY: CPT | Performed by: FAMILY MEDICINE

## 2019-09-10 PROCEDURE — 2500000003 HC RX 250 WO HCPCS: Performed by: INTERNAL MEDICINE

## 2019-09-10 PROCEDURE — 71046 X-RAY EXAM CHEST 2 VIEWS: CPT

## 2019-09-10 PROCEDURE — 6360000002 HC RX W HCPCS: Performed by: INTERNAL MEDICINE

## 2019-09-10 PROCEDURE — 2580000003 HC RX 258: Performed by: INTERNAL MEDICINE

## 2019-09-10 PROCEDURE — C1769 GUIDE WIRE: HCPCS

## 2019-09-10 PROCEDURE — C1900 LEAD, CORONARY VENOUS: HCPCS

## 2019-09-10 PROCEDURE — 6360000002 HC RX W HCPCS

## 2019-09-10 PROCEDURE — 33224 INSERT PACING LEAD & CONNECT: CPT | Performed by: FAMILY MEDICINE

## 2019-09-10 PROCEDURE — 93010 ELECTROCARDIOGRAM REPORT: CPT | Performed by: INTERNAL MEDICINE

## 2019-09-10 PROCEDURE — 33225 L VENTRIC PACING LEAD ADD-ON: CPT | Performed by: INTERNAL MEDICINE

## 2019-09-10 PROCEDURE — 99024 POSTOP FOLLOW-UP VISIT: CPT | Performed by: NURSE PRACTITIONER

## 2019-09-10 PROCEDURE — 99153 MOD SED SAME PHYS/QHP EA: CPT | Performed by: FAMILY MEDICINE

## 2019-09-10 RX ORDER — ACETAMINOPHEN 325 MG/1
650 TABLET ORAL EVERY 4 HOURS PRN
Status: DISCONTINUED | OUTPATIENT
Start: 2019-09-10 | End: 2019-09-11 | Stop reason: HOSPADM

## 2019-09-10 RX ORDER — SODIUM CHLORIDE 0.9 % (FLUSH) 0.9 %
10 SYRINGE (ML) INJECTION EVERY 12 HOURS SCHEDULED
Status: DISCONTINUED | OUTPATIENT
Start: 2019-09-10 | End: 2019-09-10

## 2019-09-10 RX ORDER — FUROSEMIDE 20 MG/1
20 TABLET ORAL DAILY
Qty: 90 TABLET | Refills: 3 | Status: SHIPPED | OUTPATIENT
Start: 2019-09-10 | End: 2019-09-11 | Stop reason: SDUPTHER

## 2019-09-10 RX ORDER — FUROSEMIDE 10 MG/ML
40 INJECTION INTRAMUSCULAR; INTRAVENOUS ONCE
Status: COMPLETED | OUTPATIENT
Start: 2019-09-10 | End: 2019-09-10

## 2019-09-10 RX ORDER — SODIUM CHLORIDE 0.9 % (FLUSH) 0.9 %
10 SYRINGE (ML) INJECTION EVERY 12 HOURS SCHEDULED
Status: DISCONTINUED | OUTPATIENT
Start: 2019-09-10 | End: 2019-09-11 | Stop reason: HOSPADM

## 2019-09-10 RX ORDER — DIPHENHYDRAMINE HYDROCHLORIDE 50 MG/ML
50 INJECTION INTRAMUSCULAR; INTRAVENOUS EVERY 6 HOURS PRN
Status: DISCONTINUED | OUTPATIENT
Start: 2019-09-10 | End: 2019-09-11 | Stop reason: HOSPADM

## 2019-09-10 RX ORDER — SODIUM CHLORIDE 0.9 % (FLUSH) 0.9 %
10 SYRINGE (ML) INJECTION PRN
Status: DISCONTINUED | OUTPATIENT
Start: 2019-09-10 | End: 2019-09-10

## 2019-09-10 RX ORDER — SODIUM CHLORIDE 0.9 % (FLUSH) 0.9 %
10 SYRINGE (ML) INJECTION PRN
Status: DISCONTINUED | OUTPATIENT
Start: 2019-09-10 | End: 2019-09-11 | Stop reason: HOSPADM

## 2019-09-10 RX ORDER — METHYLPREDNISOLONE SODIUM SUCCINATE 125 MG/2ML
125 INJECTION, POWDER, LYOPHILIZED, FOR SOLUTION INTRAMUSCULAR; INTRAVENOUS ONCE
Status: COMPLETED | OUTPATIENT
Start: 2019-09-10 | End: 2019-09-10

## 2019-09-10 RX ADMIN — METHYLPREDNISOLONE SODIUM SUCCINATE 125 MG: 125 INJECTION, POWDER, FOR SOLUTION INTRAMUSCULAR; INTRAVENOUS at 22:58

## 2019-09-10 RX ADMIN — GABAPENTIN 800 MG: 400 CAPSULE ORAL at 09:57

## 2019-09-10 RX ADMIN — SACUBITRIL AND VALSARTAN 1 TABLET: 97; 103 TABLET, FILM COATED ORAL at 09:57

## 2019-09-10 RX ADMIN — Medication 10 ML: at 23:01

## 2019-09-10 RX ADMIN — FUROSEMIDE 40 MG: 10 INJECTION, SOLUTION INTRAMUSCULAR; INTRAVENOUS at 22:57

## 2019-09-10 RX ADMIN — FUROSEMIDE 40 MG: 10 INJECTION, SOLUTION INTRAMUSCULAR; INTRAVENOUS at 09:57

## 2019-09-10 RX ADMIN — SACUBITRIL AND VALSARTAN 1 TABLET: 97; 103 TABLET, FILM COATED ORAL at 22:58

## 2019-09-10 RX ADMIN — GABAPENTIN 800 MG: 400 CAPSULE ORAL at 22:58

## 2019-09-10 RX ADMIN — FAMOTIDINE 20 MG: 10 INJECTION, SOLUTION INTRAVENOUS at 22:58

## 2019-09-10 RX ADMIN — ATORVASTATIN CALCIUM 10 MG: 10 TABLET, FILM COATED ORAL at 22:58

## 2019-09-10 RX ADMIN — FAMOTIDINE 20 MG: 20 TABLET, FILM COATED ORAL at 09:57

## 2019-09-10 ASSESSMENT — PAIN SCALES - GENERAL
PAINLEVEL_OUTOF10: 0
PAINLEVEL_OUTOF10: 0

## 2019-09-10 NOTE — DISCHARGE SUMMARY
EP Discharge Summary  [address] [facility]  9[facility]10[facility]    Patient :Doug Polanco  Room/Bed: U1H-7459/0624-70  Medical Record: [id number]  YOB: 1950    Admit date: 9/9/2019  Discharge date and time: 09/10/19     Admitting Physician: Varinder Fxo MD   Discharge Physician: Varinder Fox MD    Admission Diagnoses: Implantable cardioverter-defibrillator (ICD) in situ [Z95.810]  Discharge Diagnoses: Non Ischemic CMP s/p Implantable Biventricular ICD     Discharged Condition: stable    Hospital Course: 78 y.o. Woman with PMH of PE, NICMP, LVEF 20-25%, NYHA class III, LBBB, and QRS 152ms presented to [facility] for Biventricular ICD implant with Dr. Amanda Devlin (9/9/2019). The following day on device interrogation her CS lead was dislodged and she was taken to the EP lab for CS lead revision (9/10/2019). Today she is in safe and stable condition to be discharged home. Her left chest wall incision has dressing in place that is CDI, no hematoma. She is up eating, drinking, voiding, and walking at her baseline. She did have increased weight gain on admission and increased fluid in which she was given a dose of IV lasix with good response. CXR shows proper lead placement and no pneumothorax. Device interrogation shows proper functioning of device.      Consults: none    Significant Diagnostic Studies: radiology: CXR 9/110/19: Normal lead placement and no pneumothorax      Device Information:   Device pulse generator is [employer] Model # [id number], serial # [id number]   Right atrial lead model # [id number], serial # [id number]   Right ventricular lead model # [id number], serial # [id number]  Left ventricular lead model # [id number], serial # [id number]  RA lead measurements: sensed P wave of 2.1 mV, impedance of 418 ohms, pacing capture threshold of 0.5 V @ 0.5 ms.  RV lead measurements: sensed R  wave of 6.9 mV, impedance of 399 ohms, pacing capture threshold of 0.5 V @ 0.5 ms.  LV lead measurements: impedance of 874 ohms, pacing capture threshold of 0.75 V @ 0.5 ms  Device was programmed to DDD with lower rate of 50 and upper tracking rate of 130. Discharge Medications:   Current Discharge Medication List      CONTINUE these medications which have CHANGED    Details   furosemide (LASIX) 20 MG tablet Take 1 tablet by mouth daily as needed for weight gain   Qty: 90 tablet, Refills: 3    Comments: **Patient requests 90 days supply**         CONTINUE these medications which have NOT CHANGED    Details   ENTRESTO  MG per tablet TAKE 1 TABLET BY MOUTH TWICE DAILY  Qty: 180 tablet, Refills: 5    Comments: **Patient requests 90 days supply**      atorvastatin (LIPITOR) 10 MG tablet TAKE 1 TABLET BY MOUTH  NIGHTLY  Qty: 90 tablet, Refills: 2    Associated Diagnoses: Essential hypertension; PAD (peripheral artery disease) (HCC)      carvedilol (COREG) 25 MG tablet Take 0.5 tablets by mouth 2 times daily (with meals) Hold for SBP<100  Qty: 180 tablet, Refills: 0    Comments: **Patient requests 90 days supply**  Associated Diagnoses: Essential hypertension      acetaminophen (TYLENOL) 500 MG tablet Take 500 mg by mouth every 6 hours as needed for Pain Max 4,000mg total acetaminophen per 24 hours.       magnesium (MAGNESIUM-OXIDE) 250 MG TABS tablet Take 250 mg by mouth daily      Biotin 5000 MCG TABS Take 1 tablet by mouth daily      vitamin D (CHOLECALCIFEROL) 5000 units CAPS capsule Take 5,000 Units by mouth daily      vitamin B-12 (CYANOCOBALAMIN) 1000 MCG tablet Take 1,000 mcg by mouth daily      Vitamin A 8000 units TABS Take 1 tablet by mouth daily      vitamin C (ASCORBIC ACID) 500 MG tablet Take 500 mg by mouth daily      Cranberry-Vitamin C (CRANBERRY CONCENTRATE/VITAMINC PO) Take 2 capsules by mouth daily 25,200mg  With vit C 40mg      Multiple Vitamins-Minerals (CENTRUM SILVER PO) Take 1 tablet by mouth daily      ranitidine (ZANTAC) 150 MG tablet Take 150 mg by mouth 2 times daily      latanoprost (XALATAN) 0.005 % ophthalmic solution Place 1 drop into both eyes nightly      gabapentin (NEURONTIN) 800 MG tablet Take 800 mg by mouth 3 times daily. Ankurselvin Sandoval albuterol-ipratropium (COMBIVENT RESPIMAT)  MCG/ACT AERS inhaler Inhale 1 puff into the lungs every 6 hours as needed for Wheezing or Shortness of Breath      hydrocortisone 2.5 % cream Apply 1 each topically 3 times daily as needed (itching/rash)       rivaroxaban (XARELTO STARTER PACK) 15 & 20 MG Starter Pack 15 mg BID for 21 days then 20 mg daily with food  Qty: 1 Package, Refills: 0             Discharge Exam:  Carotid Arteries: normal pulsation bilaterally  Lungs: Diminished bilaterally   Heart: regular rate and rhythm, S1, S2 normal, no murmur, click, rub or gallop  Extremities: negative, peripheral pulses 2+ and symmetric  Incision: CDI, no hematoma     Disposition: home    Patient Instructions:   Prior to Admission medications    Medication Sig Start Date End Date Taking? Authorizing Provider   furosemide (LASIX) 20 MG tablet Take 1 tablet by mouth daily 9/10/19  Yes AIMEE Dorsey - CNP   ENTRESTO  MG per tablet TAKE 1 TABLET BY MOUTH TWICE DAILY 9/3/19  Yes Natali Tsang MD   atorvastatin (LIPITOR) 10 MG tablet TAKE 1 TABLET BY MOUTH  NIGHTLY 8/13/19  Yes Shelley Anthony APRN - CNP   carvedilol (COREG) 25 MG tablet Take 0.5 tablets by mouth 2 times daily (with meals) Hold for SBP<100 7/31/19  Yes Vladimir Johnson MD   acetaminophen (TYLENOL) 500 MG tablet Take 500 mg by mouth every 6 hours as needed for Pain Max 4,000mg total acetaminophen per 24 hours.    Yes Historical Provider, MD   magnesium (MAGNESIUM-OXIDE) 250 MG TABS tablet Take 250 mg by mouth daily   Yes Historical Provider, MD   Biotin 5000 MCG TABS Take 1 tablet by mouth daily   Yes Historical Provider, MD   vitamin D (CHOLECALCIFEROL) 5000 units CAPS capsule Take 5,000 Units by mouth daily   Yes Historical Provider, MD   vitamin B-12 (CYANOCOBALAMIN) 1000 MCG tablet Take 1,000 mcg by mouth daily   Yes Historical Provider, MD   Vitamin A 8000 units TABS Take 1 tablet by mouth daily   Yes Historical Provider, MD   vitamin C (ASCORBIC ACID) 500 MG tablet Take 500 mg by mouth daily   Yes Historical Provider, MD   Cranberry-Vitamin C (CRANBERRY CONCENTRATE/VITAMINC PO) Take 2 capsules by mouth daily 25,200mg  With vit C 40mg   Yes Historical Provider, MD   Multiple Vitamins-Minerals (CENTRUM SILVER PO) Take 1 tablet by mouth daily   Yes Historical Provider, MD   ranitidine (ZANTAC) 150 MG tablet Take 150 mg by mouth 2 times daily   Yes Historical Provider, MD   latanoprost (XALATAN) 0.005 % ophthalmic solution Place 1 drop into both eyes nightly   Yes Historical Provider, MD   gabapentin (NEURONTIN) 800 MG tablet Take 800 mg by mouth 3 times daily. .   Yes Historical Provider, MD   albuterol-ipratropium (COMBIVENT RESPIMAT)  MCG/ACT AERS inhaler Inhale 1 puff into the lungs every 6 hours as needed for Wheezing or Shortness of Breath   Yes Historical Provider, MD   hydrocortisone 2.5 % cream Apply 1 each topically 3 times daily as needed (itching/rash)    Yes Historical Provider, MD   rivaroxaban (XARELTO STARTER PACK) 15 & 20 MG Starter Pack 15 mg BID for 21 days then 20 mg daily with food 8/8/19   Mary Boudreaux MD     Activity: See discharge instructions,  Diet: cardiac diet. Wound Care: See discharge instructions. She was diuresed while here and will take lasix as needed for weight gain. She will follow up with Debbi Ventura NP for heart failure management. Follow-up with Dr. Hemanth Herr at scheduled appointment. Call [phone] with any questions. Signed:  Bouchra Aguiar  9/10/2019  9:25 AM    Greater than 35 minutes total spent on discharge.

## 2019-09-10 NOTE — PROGRESS NOTES
Electrophysiology - PROGRESS NOTE    Admit Date: 9/9/2019     Chief Complaint: BiV ICD implant      Interval History:   Patient seen and examined and notes reviewed. Patient is being followed for BiV ICD implant. She is volume overloaded currently and will give 1x dose of lasix   Site looks OK, no hematoma   CS lead dislodged on device interrogation today    Needs revision  NPO      In: -   Out: 450    Wt Readings from Last 2 Encounters:   09/10/19 282 lb 6.6 oz (128.1 kg)   08/13/19 274 lb (124.3 kg)         Data:   Scheduled Meds:   Scheduled Meds:   sodium chloride flush  10 mL Intravenous 2 times per day    methylPREDNISolone  125 mg Intravenous Once    diphenhydrAMINE  50 mg Intravenous Once    atorvastatin  10 mg Oral Nightly    carvedilol  12.5 mg Oral BID WC    sacubitril-valsartan  1 tablet Oral BID    gabapentin  800 mg Oral TID    famotidine  20 mg Oral Daily     Continuous Infusions:  PRN Meds:.sodium chloride flush, furosemide, acetaminophen, magnesium hydroxide, oxyCODONE-acetaminophen **OR** oxyCODONE-acetaminophen, albuterol sulfate HFA **AND** ipratropium **AND** MDI Treatment  Continuous Infusions:    Intake/Output Summary (Last 24 hours) at 9/10/2019 1321  Last data filed at 9/9/2019 2055  Gross per 24 hour   Intake --   Output 450 ml   Net -450 ml       CBC:   Lab Results   Component Value Date    WBC 7.2 08/29/2019    HGB 13.4 08/29/2019     08/29/2019     BMP:  Lab Results   Component Value Date     08/29/2019    K 4.5 08/29/2019    K 4.6 08/08/2019     08/29/2019    CO2 22 08/29/2019    BUN 12 08/29/2019    CREATININE 1.0 08/29/2019    GLUCOSE 82 08/29/2019     INR: No results found for: INR     CARDIAC LABS  ENZYMES:No results for input(s): CKMB, CKMBINDEX, TROPONINI in the last 72 hours.     Invalid input(s): CKTOTAL;3  FASTING LIPID PANEL:  Lab Results   Component Value Date    HDL 55 11/27/2018    LDLCALC 89 11/27/2018    TRIG 81 11/27/2018     LIVER PROFILE:  Lab Results   Component Value Date    AST 11 08/07/2019    AST 12 07/28/2019    ALT 12 08/07/2019    ALT 7 07/28/2019       -----------------------------------------------------------------  Telemetry: Personally reviewed  NSR 80's     Objective:   Vitals: BP (!) 93/54   Pulse 90   Temp 98.4 °F (36.9 °C) (Oral)   Resp 20   Ht 5' 6\" (1.676 m)   Wt 282 lb 6.6 oz (128.1 kg)   SpO2 (!) 89%   BMI 45.58 kg/m²   General appearance: alert, appears stated age and cooperative, No acute distress   Skin: Skin color, texture, turgor normal. No rashes or ecchymosis. Neck: no JVD, supple, symmetrical, trachea midline   Lungs: diminished bilaterally, no accessory muscle use, no respiratory distress  Heart: Normal rate, reg rhythm; S1&S2. Abdomen: soft, non-tender; bowel sounds normal  Extremities: No edema, DP +palp  Psychiatric: normal insight and affect      Assessment & Plan:    HPI: 78 y.o. Woman with PMH of PE, NICMP, LVEF 20-25%, NYHA class III, LBBB, and QRS 152ms presented to [facility] for Biventricular ICD implant with Dr. Hemanth Herr (9/9/2019). Today she is in safe and stable condition to be discharged home. Her left chest wall incision has dressing in place that is CDI, no hematoma. She is up eating, drinking, voiding, and walking at her baseline. She did have increased weight gain on admission and increased fluid in which she was given a dose of IV lasix. CXR shows proper lead placement and no pneumothorax. Device interrogation shows proper functioning of device.      NICMP  -LVEF 20-25%  -s/p BiV ICD implant 9/9/19 Dr. Hemanth Herr   -NYHA Class III  -BB, ACE, Lasix  -I/O and daily weights  -IV lasix given today   -282# (274# at [address])  -Site looks good/ dressing in place  -CXR no pneumothorax  -Device interrogation shows LV lead dislodgement; very high thresholds and not proper placement     Hx PE  -xarelto on hold d/t procedure   -restart post procedure     PLAN:  40 mg IV lasix   On device interrogation, CS lead

## 2019-09-10 NOTE — PLAN OF CARE
Problem: Pain:  Goal: Pain level will decrease  Description  Pain level will decrease  9/9/2019 2243 by Arminda Alvarez RN  Outcome: Ongoing     Problem: Pain:  Goal: Control of acute pain  Description  Control of acute pain  9/9/2019 2243 by Arminda Alvarez RN  Outcome: Ongoing     Problem: Pain:  Goal: Control of chronic pain  Description  Control of chronic pain  9/9/2019 2243 by Arminda Alvarez RN  Outcome: Ongoing     Problem: Risk for Impaired Skin Integrity  Goal: Tissue integrity - skin and mucous membranes  Description  Structural intactness and normal physiological function of skin and  mucous membranes. 9/9/2019 2243 by Arminda Alvarez RN  Outcome: Ongoing  Note:   Skin assessment completed every shift per protocol. Pt assessed for incontinence, appropriate barrier cream applied as needed. Pt encouraged to turn/rotate every 2 hours. Assistance provided if pt unable to do so themselves. Will continue to monitor. Problem: Falls - Risk of:  Goal: Will remain free from falls  Description  Will remain free from falls  9/9/2019 2243 by Arminda Alvarez RN  Outcome: Ongoing  Note:   Patient is wearing nonskid socks. Bed is alarmed on, locked, and in lowest position. Room is free of clutter. Call light is within reach and used appropriately. Fall risk assessed per protocol. Will continue to monitor.         Problem: Falls - Risk of:  Goal: Absence of physical injury  Description  Absence of physical injury  9/9/2019 2243 by Arminda Alvarez RN  Outcome: Ongoing     Problem: Infection - Surgical Site:  Goal: Will show no infection signs and symptoms  Description  Will show no infection signs and symptoms  9/9/2019 2243 by Arminda Alvarez RN  Outcome: Ongoing  Note:   No S/S of infection     Problem: Mobility - Impaired:  Goal: Mobility will improve  Description  Mobility will improve  9/9/2019 2243 by Arminda Alvarez RN  Outcome: Ongoing no

## 2019-09-10 NOTE — PROCEDURES
[address]     Electrophysiology Procedure Note       Date of Procedure: 9/10/2019  Patient's Name: Kathy Zaragoza  YOB: 1950   Medical Record Number: [id number]  Procedure Performed by: Apryl Stephen MD      National Coverage Determination Criteria Code: 8    2 - Patients with ICM and documented prior MI, or NIDCM > 3 months, NYHA II or, and measured LVEF <= 35%, only if the following additional criteria are also must not have: a.  Cardiogenic shock or symptomatic hypotension while in a stable      baseline rhythm   b.  Had a CABG or PTCA within the past 3 months    c.  Had an acute MI within the past 40 days    d. Clinical symptoms or findings that would make them a candidate for      coronary revascularization    e. Irreversible brain damage from preexisting cerebral disease   f. Any disease, other than cardiac disease (e.g. cancer, uremia, liver     failure) associated with a likelihood of survival less than 1 year    Procedures performed:    · Removal of the previously implanted CS lead  · Insertion of MRI compatible left ventricular lead under fluoroscopy  · Re-use of the MRI compatible right ventricular defibrillator lead without fluoroscopy. · Re-use of the MRI compatible right atrial lead without fluoroscopy  · Re-using MRI compatible BiV ICD. · Electronic analysis of lead and device. · Anesthesia: Local and Monitored Anesthesia Care  · Level of sedation plan: Moderate sedation (conscious sedation) with intravenous Midazolam 4.5 mg and Fentanyl 175 mcg   · Start time: 2036  · Stop time: 2213  · Mallampati airway assessment class:  I  · ASA class: 1  · (there were no independent trained observers who had no other duties involved in this procedure)      Indication of the procedure:    Kathy Zaragoza is a 78 y.o. female with non-ischemic cardiomyopathy, LVEF 20-25%, NYHA Class III, QRS duration of 152 ms with LBBB who has been on guideline-directed medical therapy for more
was then placed into the cleaned pocket. The pulse generator was sutured inside the pocket. Copious amount (> 200 cc) of saline flush was used to irrigate the pocket. The pocket was then closed using a 2-0 Vicryl subcutaneous layer and a subcuticular layer using 4-0 Vicryl. The skin was covered with pressure dressing. Defibrillation threshold testing was not performed. All sponge and needle counts were reported as correct at the end of the procedure. Specimen collected: none    Estimated blood loss: < 20 cc    The patient tolerated the procedure well and there were no complications. Post-sedation evaluation was completed. Patient was transported to the holding area in stable condition. Device and Leads information:    Device pulse generator is [employer] Model # [id number], serial # [id number]   Right atrial lead model # [id number], serial # [id number]   Right ventricular lead model # [id number], serial # [id number]  Left ventricular lead model # [id number], serial # [id number]      RA lead measurements: sensed P wave of 3.7 mV, impedance of 1050 ohms, pacing capture threshold of 0.9 V @ 0.5 ms.  RV lead measurements: sensed R  wave of 16.2 mV, impedance of 638 ohms, pacing capture threshold of 0.6 V @ 0.5 ms.  LV lead measurements: impedance of 817 ohms, pacing capture threshold of 2.7V @ 0.5 ms    Device was programmed to DDD with lower rate of 50 and upper tracking rate of 130. Impression:    Pre- and post-procedural diagnoses of non-ischemic cardiomyopathy, LVEF 20-25%, NYHA Class III, QRS duration of 152 ms with LBBB who has been on guideline-directed medical therapy for more than 3 months [Beta-blocker therapy: Yes; ACE-i/ARB therapy: Yes and with patient prognosis of life expectancy of > 1 year with successful implantation of a [employer] BiV ICD.      Plan:     The patient will be admitted and have usual post-implant care, including chest x-ray, and antibiotic therapy and interrogation of the

## 2019-09-11 VITALS
DIASTOLIC BLOOD PRESSURE: 60 MMHG | RESPIRATION RATE: 20 BRPM | OXYGEN SATURATION: 93 % | WEIGHT: 288.8 LBS | HEIGHT: 66 IN | BODY MASS INDEX: 46.41 KG/M2 | HEART RATE: 63 BPM | SYSTOLIC BLOOD PRESSURE: 100 MMHG | TEMPERATURE: 98.1 F

## 2019-09-11 LAB
ANION GAP SERPL CALCULATED.3IONS-SCNC: 14 MMOL/L (ref 3–16)
BUN BLDV-MCNC: 20 MG/DL (ref 7–20)
CALCIUM SERPL-MCNC: 9 MG/DL (ref 8.3–10.6)
CHLORIDE BLD-SCNC: 107 MMOL/L (ref 99–110)
CO2: 23 MMOL/L (ref 21–32)
CREAT SERPL-MCNC: 1.2 MG/DL (ref 0.6–1.2)
EKG ATRIAL RATE: 72 BPM
EKG DIAGNOSIS: NORMAL
EKG P AXIS: 49 DEGREES
EKG P-R INTERVAL: 150 MS
EKG Q-T INTERVAL: 494 MS
EKG QRS DURATION: 140 MS
EKG QTC CALCULATION (BAZETT): 540 MS
EKG R AXIS: -84 DEGREES
EKG T AXIS: 51 DEGREES
EKG VENTRICULAR RATE: 72 BPM
GFR AFRICAN AMERICAN: 54
GFR NON-AFRICAN AMERICAN: 44
GLUCOSE BLD-MCNC: 182 MG/DL (ref 70–99)
POTASSIUM SERPL-SCNC: 4.1 MMOL/L (ref 3.5–5.1)
PRO-BNP: 1839 PG/ML (ref 0–124)
SODIUM BLD-SCNC: 144 MMOL/L (ref 136–145)

## 2019-09-11 PROCEDURE — 93010 ELECTROCARDIOGRAM REPORT: CPT | Performed by: INTERNAL MEDICINE

## 2019-09-11 PROCEDURE — 2580000003 HC RX 258: Performed by: INTERNAL MEDICINE

## 2019-09-11 PROCEDURE — 80048 BASIC METABOLIC PNL TOTAL CA: CPT

## 2019-09-11 PROCEDURE — 83880 ASSAY OF NATRIURETIC PEPTIDE: CPT

## 2019-09-11 PROCEDURE — 94760 N-INVAS EAR/PLS OXIMETRY 1: CPT

## 2019-09-11 PROCEDURE — 6360000002 HC RX W HCPCS: Performed by: NURSE PRACTITIONER

## 2019-09-11 PROCEDURE — 6370000000 HC RX 637 (ALT 250 FOR IP): Performed by: INTERNAL MEDICINE

## 2019-09-11 PROCEDURE — 93005 ELECTROCARDIOGRAM TRACING: CPT | Performed by: INTERNAL MEDICINE

## 2019-09-11 PROCEDURE — 36415 COLL VENOUS BLD VENIPUNCTURE: CPT

## 2019-09-11 PROCEDURE — 99024 POSTOP FOLLOW-UP VISIT: CPT | Performed by: NURSE PRACTITIONER

## 2019-09-11 RX ORDER — FUROSEMIDE 10 MG/ML
60 INJECTION INTRAMUSCULAR; INTRAVENOUS ONCE
Status: COMPLETED | OUTPATIENT
Start: 2019-09-11 | End: 2019-09-11

## 2019-09-11 RX ORDER — FUROSEMIDE 20 MG/1
20 TABLET ORAL PRN
Qty: 90 TABLET | Refills: 3 | Status: SHIPPED | OUTPATIENT
Start: 2019-09-11 | End: 2020-06-03 | Stop reason: SDUPTHER

## 2019-09-11 RX ADMIN — ACETAMINOPHEN 650 MG: 325 TABLET ORAL at 09:37

## 2019-09-11 RX ADMIN — FAMOTIDINE 20 MG: 20 TABLET, FILM COATED ORAL at 09:36

## 2019-09-11 RX ADMIN — Medication 10 ML: at 09:38

## 2019-09-11 RX ADMIN — FUROSEMIDE 20 MG: 20 TABLET ORAL at 09:36

## 2019-09-11 RX ADMIN — GABAPENTIN 800 MG: 400 CAPSULE ORAL at 09:37

## 2019-09-11 RX ADMIN — SACUBITRIL AND VALSARTAN 1 TABLET: 97; 103 TABLET, FILM COATED ORAL at 09:37

## 2019-09-11 RX ADMIN — FUROSEMIDE 60 MG: 10 INJECTION, SOLUTION INTRAMUSCULAR; INTRAVENOUS at 11:21

## 2019-09-11 ASSESSMENT — PAIN DESCRIPTION - PAIN TYPE: TYPE: CHRONIC PAIN

## 2019-09-11 ASSESSMENT — PAIN DESCRIPTION - LOCATION: LOCATION: BACK

## 2019-09-11 ASSESSMENT — PAIN DESCRIPTION - ORIENTATION: ORIENTATION: LOWER

## 2019-09-11 ASSESSMENT — PAIN DESCRIPTION - FREQUENCY: FREQUENCY: INTERMITTENT

## 2019-09-11 ASSESSMENT — PAIN DESCRIPTION - ONSET: ONSET: GRADUAL

## 2019-09-11 ASSESSMENT — PAIN DESCRIPTION - PROGRESSION: CLINICAL_PROGRESSION: GRADUALLY WORSENING

## 2019-09-11 ASSESSMENT — PAIN SCALES - GENERAL
PAINLEVEL_OUTOF10: 1
PAINLEVEL_OUTOF10: 3

## 2019-09-11 ASSESSMENT — PAIN DESCRIPTION - DIRECTION: RADIATING_TOWARDS: NON-RADIATING

## 2019-09-11 ASSESSMENT — PAIN - FUNCTIONAL ASSESSMENT: PAIN_FUNCTIONAL_ASSESSMENT: ACTIVITIES ARE NOT PREVENTED

## 2019-09-11 ASSESSMENT — PAIN DESCRIPTION - DESCRIPTORS: DESCRIPTORS: ACHING;DULL

## 2019-09-11 NOTE — PLAN OF CARE
Pt denies pain to surgical site. Pt states chronic pain lower back.   Pt states interventions used (emotional support, enc to rest and reposition self for comfort, tylenol prn) effective pain control as evidenced by pain scores

## 2019-09-18 ENCOUNTER — OFFICE VISIT (OUTPATIENT)
Dept: CARDIOLOGY CLINIC | Age: 69
End: 2019-09-18
Payer: MEDICARE

## 2019-09-18 ENCOUNTER — NURSE ONLY (OUTPATIENT)
Dept: CARDIOLOGY CLINIC | Age: 69
End: 2019-09-18
Payer: MEDICARE

## 2019-09-18 VITALS
HEART RATE: 66 BPM | HEIGHT: 66 IN | BODY MASS INDEX: 45.16 KG/M2 | OXYGEN SATURATION: 98 % | DIASTOLIC BLOOD PRESSURE: 58 MMHG | SYSTOLIC BLOOD PRESSURE: 92 MMHG | WEIGHT: 281 LBS

## 2019-09-18 DIAGNOSIS — I47.29 NSVT (NONSUSTAINED VENTRICULAR TACHYCARDIA): ICD-10-CM

## 2019-09-18 DIAGNOSIS — I26.99 ACUTE PULMONARY EMBOLISM WITHOUT ACUTE COR PULMONALE, UNSPECIFIED PULMONARY EMBOLISM TYPE (HCC): Primary | ICD-10-CM

## 2019-09-18 DIAGNOSIS — Z95.810 BIVENTRICULAR ICD (IMPLANTABLE CARDIOVERTER-DEFIBRILLATOR) IN PLACE: ICD-10-CM

## 2019-09-18 DIAGNOSIS — I10 ESSENTIAL HYPERTENSION: ICD-10-CM

## 2019-09-18 DIAGNOSIS — I42.8 NONISCHEMIC CARDIOMYOPATHY (HCC): ICD-10-CM

## 2019-09-18 DIAGNOSIS — I50.22 CHRONIC LEFT SYSTOLIC HEART FAILURE (HCC): ICD-10-CM

## 2019-09-18 DIAGNOSIS — F17.200 SMOKING ADDICTION: ICD-10-CM

## 2019-09-18 DIAGNOSIS — I50.23 ACUTE ON CHRONIC SYSTOLIC HEART FAILURE, NYHA CLASS 3 (HCC): ICD-10-CM

## 2019-09-18 PROCEDURE — 93284 PRGRMG EVAL IMPLANTABLE DFB: CPT | Performed by: INTERNAL MEDICINE

## 2019-09-18 PROCEDURE — 93290 INTERROG DEV EVAL ICPMS IP: CPT | Performed by: INTERNAL MEDICINE

## 2019-09-18 PROCEDURE — 99213 OFFICE O/P EST LOW 20 MIN: CPT | Performed by: NURSE PRACTITIONER

## 2019-09-18 RX ORDER — TIZANIDINE 4 MG/1
4 TABLET ORAL EVERY 8 HOURS PRN
COMMUNITY
Start: 2019-09-16 | End: 2020-05-26 | Stop reason: ALTCHOICE

## 2019-09-18 RX ORDER — OXYCODONE HYDROCHLORIDE AND ACETAMINOPHEN 5; 325 MG/1; MG/1
1 TABLET ORAL NIGHTLY
COMMUNITY
Start: 2019-09-16 | End: 2019-09-23

## 2019-09-18 NOTE — PROGRESS NOTES
[address] [facility] [address] [address] [phone]    PrimaryCare Doctor:  Vita Lee  Primary Cardiologist: Dr. Mitchell Trujillo    Chief Complaint   Patient presents with    Follow-Up from Hospital     s/p ppm, nsvt, chf    Other     c/o arm pain     History of Present Illness:  Aleyda Dangelo is a 78 y.o. female with PMH PE (currently on xarelto), SHF, NSVT, LBBB, PAD, COPD, HTN (At home, she holds coreg when SBP < 100 typically 1-2 x/week). + current smoker- 7 cigs per day. Patient of Dr. Mitchell Trujillo, Referred to Dr. Davion Mckeon 6/2019 for [address] [place] CD consult, deferred until open wounds on hands healed. Completed on 9/9/2019 > BiV ICD for non ischemic CM. Complicated by CS lead dislodged and taken back to EP lab for CS lead revision. She needed lasix for diuresis while hospitalized. Patient [facility] cardiology for follow up for PE and systolic HF. Continues with SOB with activity only. It resolves with rest. It is not associated with CP. She remains active. She recently got a BiV ICD placed and has tolerated that procedure well. Home SBP usually in the 90s, only takes coreg when SBP >100. Today by her home recordings it appears she only takes it a few times a week. Lasix only when she gets BLE edema, about 1 every 1-2 weeks. Home weight 275-278lbs up and down and is consistent. SOB: No  Orthopnea:No  PND:No  Chest pain: No  Weight gain: No   Edema:No  Abdominal Distention:  No  Activity level: Baseline  Lightheaded/syncope: No  Follows 2gm Na Diet:Yes  Follows Fluid Restriction:Yes   RODRIGO: No  CPAP: No Sleep Study completed 10/4/2018- No RODRIGO   Smoking Hx:Yes 7 cigs per day    Review of Systems:   General: Denies fever, chills, fatigue, weakness  Skin: Denies skin changes, rash, itching, lesions.   HEENT: Denies headache, dizziness, vision changes, nosebleeds, sore throat, nasal drainage  RESP: Denies cough, sputum, dyspnea, wheeze, snoring  CARD: Denies palpitations,  murmur  GI:Denies nausea, vomiting, heartburn, loss of appetite, change in bowels  : Denies frequency, pain, incontinence, polyuria  VASC: Denies claudication, leg cramps, clots  MUSC/SKEL: Denies pain, stiffness, arthritis  PSYCH: Denies anxiety, depression, stress  NEURO: Denies numbness, tingling, weakness,change in mood or memory  HEME: Denies abn bruising, bleeding, anemia  ENDO: Denies intolerance to heat, cold, excessive thirst or hunger, hx thyroid disease    BP (!) 92/58   Pulse 66   Ht 5' 6\" (1.676 m)   Wt 281 lb (127.5 kg)   SpO2 98%   BMI 45.35 kg/m²   Wt Readings from Last 3 Encounters:   09/18/19 281 lb (127.5 kg)   09/11/19 288 lb 12.8 oz (131 kg)   08/13/19 274 lb (124.3 kg)       Physical Exam:  GEN: Appears well, obese,  no acute distress  SKIN: Pink, warm, dry. Nails without clubbing. HEENT: PERRLA. Normocephalic, atraumatic. Neck supple. No adenopathy. LUNG: AP diameter normal. Clear bilateral. No wheeze, rales, or ronchi. Respiratory effort normal.  HEART: S1S2 A/R. No JVD. No carotid bruit. No murmur, rub or gallop. Left upper chest ICD site with steri strips CDi without hematoma. ABD: Soft, nontender. +BS X 4 quads. No hepatomegaly. EXT: Radial and pedal pulses 2+ and symmetric. Without varicosities. No edema. MUSCSKEL: Good ROM X4 extremities. No deformity. NEURO: A/O X3. Calm and cooperative. Past Medical History:   has a past medical history of CHF (congestive heart failure) ([facility] COPD (chronic obstructive pulmonary disease) ([facility] and Hypertension. Surgical History:   has a past surgical history that includes Carpal tunnel release; Cholecystectomy; knee surgery; Tonsillectomy; Tubal ligation; and pacemaker placement. Social History:   reports that she has been smoking cigarettes. She has been smoking about 0.75 packs per day. She has never used smokeless tobacco. She reports that she does not drink alcohol or use drugs.    Family Lab Results   Component Value Date    PROBNP 1,839 09/11/2019    PROBNP 1,112 08/08/2019    PROBNP 1,703 08/07/2019     Iron Studies:  No results found for: TIBC, FERRITIN  GLUCOSE: No results for input(s): GLUCOSE in the last 72 hours. LIVER PROFILE:   Lab Results   Component Value Date    AST 11 08/07/2019    ALT 12 08/07/2019    LIPASE 22.0 10/08/2015    LABALBU 3.5 08/07/2019    BILITOT 0.5 08/07/2019    ALKPHOS 69 08/07/2019     PT/INR: No results found for: PROTIME, INR  Cardiac Enzymes:  Lab Results   Component Value Date    TROPONINI <0.01 08/07/2019     FASTING LIPID PANEL:  Lab Results   Component Value Date    CHOL 160 11/27/2018    HDL 55 11/27/2018    LDLCALC 89 11/27/2018    TRIG 81 11/27/2018       Cardiac Imaging: Reports reviewed with patient today. EKG: Normal sinus rhythmPossible Left atrial enlargementLeft bundle branch blockAbnormal ECGWhen compared with ECG of 28-JUL-2019 10:29,No significant change was foundConfirmed by Monae Cat MD, MERVAT ([id number]) on 8/7/2019 10:33:05 AM     ECHO:     8/8/2019  Summary   Limited study.  Zan Ware left ventricular systolic function appears severely reduced.   Ejection fraction is visually estimated to be 20-25% with diffuse   hypokinesis. Severely dilated left ventricle. Normal left ventricular wall   thickness.   The right ventricle is normal in size and function.      5/16/2019  Summary   LV is dilated.   LV Ejection fraction is visually estimated to be    20-25%.   severe global hypokinesis with dysynchrony lateral and septal walls   mild MR   The left atrium is dilated.   Normal right ventricular size     11/28/2018   Left ventricular cavity size is mildly dilated. Normal left ventricular wall   thickness. Overall left ventricular systolic function appears severely   reduced. Ejection fraction is visually estimated to be 30%. There is   moderate diffuse hypokinesis with severe hypokinesis of the septal wall and   apex of the left ventricle.  Indeterminate

## 2019-09-18 NOTE — PATIENT INSTRUCTIONS
Instructions:   1. Medications: Continue current medications  2. Labs: None  3. Follow up: Dr. Lori Hancock November 4. Daily weight: Call for increase 2 lbs/day or 5 lbs/week. 5. 2 gm sodium diet:  6. Fluid Restriction: 64 oz.

## 2019-09-18 NOTE — PROGRESS NOTES
In office device interrogation of her ICD shows normal device function. All testing was within normal limits. No changes need to be made at this time. This is a one week post implant visit. - implant site is healing well w/ no s/s of infection    LV lead measurement today:  1.25V @ 0.4ms      - normal, however, given LV lead history, will continue to monitor closely. - LV lead was replaced 1 day prior to implant. New LV lead measurement at implant: 0.75V @ 0.5ms    Pt will f/u in 3 months in office.      - remote monitoring discussed briefly today. Will discuss in further detail during next visit. Patient is to see BAMBI Rosa in office today also. Dr. Btesey Marks to review interrogation. See interrogation/Paceart report for further details.

## 2019-09-27 ENCOUNTER — HOSPITAL ENCOUNTER (OUTPATIENT)
Dept: PHYSICAL THERAPY | Age: 69
Setting detail: THERAPIES SERIES
Discharge: HOME | End: 2019-09-27
Payer: MEDICARE

## 2019-09-27 PROCEDURE — 97530 THERAPEUTIC ACTIVITIES: CPT | Performed by: CHIROPRACTOR

## 2019-09-27 PROCEDURE — 97161 PT EVAL LOW COMPLEX 20 MIN: CPT | Performed by: CHIROPRACTOR

## 2019-09-27 NOTE — FLOWSHEET NOTE
Continue per plan of care [] Alter current plan (see comments)  [] Plan of care initiated [] Hold pending MD visit [] Discharge    Plan for Next Session: US LIZZY     Electronically signed by:  Destiny JEAN#[id number]

## 2019-09-27 NOTE — PLAN OF CARE
Outpatient Physical Therapy  [x] [facility]    Phone: [phone]   Fax: [phone]   [] [facility]  Phone: [phone]              Fax: [phone]  [] [facility]   Phone: [phone]   Fax: [phone]     To: Referring Practitioner: Dr. Chip Cam      Patient: Audrey Ceja   : 1950   MRN: [id number]  Evaluation Date: 2019      Diagnosis Information:  · Diagnosis: Trochanteric Bursitis R hip   ·       Physical Therapy Certification/Re-Certification Form  Dear Dr. Johnathan Crawley following patient has been evaluated for physical therapy services and for therapy to continue, Medicare requires monthly physician review of the treatment plan. Please review the attached evaluation and/or summary of the patient's plan of care, and verify that you agree therapy should continue by signing the attached document and sending it back to our office. Plan of Care/Treatment to date:  [x] Therapeutic Exercise    [x] Modalities:  [x] Therapeutic Activity     [] Ultrasound  [] Electrical Stimulation  [] Gait Training      [] Cervical Traction [] Lumbar Traction  [] Neuromuscular Re-education    [] Cold/hotpack [] Iontophoresis   [x] Instruction in HEP     Other:  [x] Manual Therapy      []             [] Aquatic Therapy      []           ? Frequency/Duration:  # Days per week: [] 1 day # Weeks: [] 1 week [x] 5 weeks     [x] 2 days? [] 2 weeks [] 6 weeks     [] 3 days   [] 3 weeks [] 7 weeks     [] 4 days   [] 4 weeks [] 8 weeks    Rehab Potential: [] Excellent [x] Good [] Fair  [] Poor       Electronically signed by:  Enriqueta Hernández  #[id number]      If you have any questions or concerns, please don't hesitate to call.   Thank you for your referral.      Physician Signature:________________________________Date:__________________  By signing above, therapists plan is approved by physician

## 2019-10-01 ENCOUNTER — HOSPITAL ENCOUNTER (OUTPATIENT)
Dept: PHYSICAL THERAPY | Age: 69
Setting detail: THERAPIES SERIES
Discharge: HOME | End: 2019-10-01
Payer: MEDICARE

## 2019-10-01 PROCEDURE — 97140 MANUAL THERAPY 1/> REGIONS: CPT

## 2019-10-01 PROCEDURE — 97035 APP MDLTY 1+ULTRASOUND EA 15: CPT

## 2019-10-02 ENCOUNTER — HOSPITAL ENCOUNTER (EMERGENCY)
Age: 69
Discharge: HOME | End: 2019-10-02
Attending: EMERGENCY MEDICINE
Payer: MEDICARE

## 2019-10-02 VITALS
DIASTOLIC BLOOD PRESSURE: 68 MMHG | OXYGEN SATURATION: 98 % | SYSTOLIC BLOOD PRESSURE: 106 MMHG | BODY MASS INDEX: 45.39 KG/M2 | HEART RATE: 73 BPM | TEMPERATURE: 98 F | RESPIRATION RATE: 12 BRPM | WEIGHT: 282.41 LBS | HEIGHT: 66 IN

## 2019-10-02 DIAGNOSIS — L73.9 FOLLICULITIS: Primary | ICD-10-CM

## 2019-10-02 PROCEDURE — 99282 EMERGENCY DEPT VISIT SF MDM: CPT

## 2019-10-02 RX ORDER — CLINDAMYCIN HYDROCHLORIDE 300 MG/1
300 CAPSULE ORAL 3 TIMES DAILY
Qty: 30 CAPSULE | Refills: 0 | Status: SHIPPED | OUTPATIENT
Start: 2019-10-02 | End: 2019-10-12

## 2019-10-04 ENCOUNTER — HOSPITAL ENCOUNTER (OUTPATIENT)
Dept: PHYSICAL THERAPY | Age: 69
Setting detail: THERAPIES SERIES
Discharge: HOME | End: 2019-10-04
Payer: MEDICARE

## 2019-10-04 PROCEDURE — 97140 MANUAL THERAPY 1/> REGIONS: CPT | Performed by: CHIROPRACTOR

## 2019-10-04 PROCEDURE — 97035 APP MDLTY 1+ULTRASOUND EA 15: CPT | Performed by: CHIROPRACTOR

## 2019-10-09 ENCOUNTER — HOSPITAL ENCOUNTER (OUTPATIENT)
Dept: PHYSICAL THERAPY | Age: 69
Setting detail: THERAPIES SERIES
Discharge: HOME | End: 2019-10-09
Payer: MEDICARE

## 2019-10-09 PROCEDURE — 97035 APP MDLTY 1+ULTRASOUND EA 15: CPT | Performed by: CHIROPRACTOR

## 2019-10-09 PROCEDURE — 97140 MANUAL THERAPY 1/> REGIONS: CPT | Performed by: CHIROPRACTOR

## 2019-10-10 ENCOUNTER — TELEPHONE (OUTPATIENT)
Dept: CARDIOLOGY CLINIC | Age: 69
End: 2019-10-10

## 2019-10-15 ENCOUNTER — HOSPITAL ENCOUNTER (OUTPATIENT)
Dept: PHYSICAL THERAPY | Age: 69
Setting detail: THERAPIES SERIES
Discharge: HOME | End: 2019-10-15
Payer: MEDICARE

## 2019-10-15 PROCEDURE — 97035 APP MDLTY 1+ULTRASOUND EA 15: CPT | Performed by: CHIROPRACTOR

## 2019-10-15 PROCEDURE — 97140 MANUAL THERAPY 1/> REGIONS: CPT | Performed by: CHIROPRACTOR

## 2019-10-17 ENCOUNTER — HOSPITAL ENCOUNTER (OUTPATIENT)
Dept: PHYSICAL THERAPY | Age: 69
Setting detail: THERAPIES SERIES
Discharge: HOME | End: 2019-10-17
Payer: MEDICARE

## 2019-10-17 PROCEDURE — 97140 MANUAL THERAPY 1/> REGIONS: CPT | Performed by: CHIROPRACTOR

## 2019-10-17 PROCEDURE — 97035 APP MDLTY 1+ULTRASOUND EA 15: CPT | Performed by: CHIROPRACTOR

## 2019-11-21 ENCOUNTER — OFFICE VISIT (OUTPATIENT)
Dept: CARDIOLOGY CLINIC | Age: 69
End: 2019-11-21
Payer: MEDICARE

## 2019-11-21 VITALS
HEART RATE: 85 BPM | OXYGEN SATURATION: 97 % | SYSTOLIC BLOOD PRESSURE: 104 MMHG | BODY MASS INDEX: 44.84 KG/M2 | HEIGHT: 66 IN | WEIGHT: 279 LBS | DIASTOLIC BLOOD PRESSURE: 70 MMHG

## 2019-11-21 DIAGNOSIS — I47.29 NSVT (NONSUSTAINED VENTRICULAR TACHYCARDIA): ICD-10-CM

## 2019-11-21 DIAGNOSIS — I10 ESSENTIAL HYPERTENSION: ICD-10-CM

## 2019-11-21 DIAGNOSIS — I73.9 PAD (PERIPHERAL ARTERY DISEASE) (HCC): ICD-10-CM

## 2019-11-21 DIAGNOSIS — E66.01 MORBID OBESITY (HCC): ICD-10-CM

## 2019-11-21 DIAGNOSIS — F17.218 CIGARETTE NICOTINE DEPENDENCE WITH OTHER NICOTINE-INDUCED DISORDER: ICD-10-CM

## 2019-11-21 DIAGNOSIS — I50.22 CHRONIC SYSTOLIC HEART FAILURE (HCC): Primary | ICD-10-CM

## 2019-11-21 DIAGNOSIS — Z86.711 HISTORY OF PULMONARY EMBOLUS (PE): ICD-10-CM

## 2019-11-21 PROCEDURE — 1090F PRES/ABSN URINE INCON ASSESS: CPT | Performed by: INTERNAL MEDICINE

## 2019-11-21 PROCEDURE — G8417 CALC BMI ABV UP PARAM F/U: HCPCS | Performed by: INTERNAL MEDICINE

## 2019-11-21 PROCEDURE — G8484 FLU IMMUNIZE NO ADMIN: HCPCS | Performed by: INTERNAL MEDICINE

## 2019-11-21 PROCEDURE — 99214 OFFICE O/P EST MOD 30 MIN: CPT | Performed by: INTERNAL MEDICINE

## 2019-11-21 PROCEDURE — 4004F PT TOBACCO SCREEN RCVD TLK: CPT | Performed by: INTERNAL MEDICINE

## 2019-11-21 PROCEDURE — 1123F ACP DISCUSS/DSCN MKR DOCD: CPT | Performed by: INTERNAL MEDICINE

## 2019-11-21 PROCEDURE — 4040F PNEUMOC VAC/ADMIN/RCVD: CPT | Performed by: INTERNAL MEDICINE

## 2019-11-21 PROCEDURE — G8400 PT W/DXA NO RESULTS DOC: HCPCS | Performed by: INTERNAL MEDICINE

## 2019-11-21 PROCEDURE — 3017F COLORECTAL CA SCREEN DOC REV: CPT | Performed by: INTERNAL MEDICINE

## 2019-11-21 PROCEDURE — G8427 DOCREV CUR MEDS BY ELIG CLIN: HCPCS | Performed by: INTERNAL MEDICINE

## 2019-12-12 ENCOUNTER — TELEPHONE (OUTPATIENT)
Dept: CARDIOLOGY CLINIC | Age: 69
End: 2019-12-12

## 2019-12-12 DIAGNOSIS — I50.22 CHRONIC LEFT SYSTOLIC HEART FAILURE (HCC): Primary | ICD-10-CM

## 2019-12-20 ENCOUNTER — OFFICE VISIT (OUTPATIENT)
Dept: PHARMACY | Age: 69
End: 2019-12-20
Payer: MEDICARE

## 2019-12-20 VITALS
DIASTOLIC BLOOD PRESSURE: 81 MMHG | OXYGEN SATURATION: 97 % | WEIGHT: 282.6 LBS | HEART RATE: 70 BPM | BODY MASS INDEX: 45.61 KG/M2 | SYSTOLIC BLOOD PRESSURE: 112 MMHG

## 2019-12-20 DIAGNOSIS — I50.22 CHRONIC SYSTOLIC HEART FAILURE (HCC): Primary | ICD-10-CM

## 2019-12-20 PROCEDURE — 99213 OFFICE O/P EST LOW 20 MIN: CPT

## 2019-12-20 RX ORDER — LORATADINE 10 MG/1
10 TABLET ORAL DAILY PRN
COMMUNITY

## 2020-01-10 RX ORDER — ATORVASTATIN CALCIUM 10 MG/1
10 TABLET, FILM COATED ORAL NIGHTLY
Qty: 90 TABLET | Refills: 2 | Status: SHIPPED | OUTPATIENT
Start: 2020-01-10 | End: 2020-08-03 | Stop reason: SDUPTHER

## 2020-01-10 NOTE — TELEPHONE ENCOUNTER
Medication Refill    Medication needing refilled:  atorvastatin (LIPITOR)       Doseage of the medication:  10mg  How are you taking this medication (QD, BID, TID, QID, PRN):  TAKE 1 TABLET BY MOUTH  NIGHTLY  30 or 90 day supply called in:  90 day  Which Pharmacy are we sending the medication to?:    Pt can be reached at [address], [address] [place] [address]

## 2020-01-29 ENCOUNTER — TELEPHONE (OUTPATIENT)
Dept: CARDIOLOGY CLINIC | Age: 70
End: 2020-01-29

## 2020-01-29 NOTE — TELEPHONE ENCOUNTER
Please evaluate for CC  Caudal epidural  Dr. Montero Body  --  2/5/2020  Pt needs to hold Xarelto 2 days prior to procedure  (f) [phone]  Last echo 8/7/19  Last OV note 21/47/84: Chronic systolic heart failure. EF 30%, repeat echo showed 25%, PAD, Unprovoked PE, COPD/Nicotine Addiction, NSVT, Essential hypertension, Morbid obesity, Chronic pain.

## 2020-01-29 NOTE — TELEPHONE ENCOUNTER
Patient is planning to have a caudal epidural on 2/5/2020 and is requesting to hold Xarelto x 2 days.   Last office visit 11/21/19  Hx Heart failure, PAD, PE(8/2018), NSVT, HTN

## 2020-02-04 ENCOUNTER — HOSPITAL ENCOUNTER (EMERGENCY)
Age: 70
Discharge: HOME | End: 2020-02-04
Payer: MEDICARE

## 2020-02-04 VITALS
BODY MASS INDEX: 45.58 KG/M2 | WEIGHT: 283.6 LBS | HEIGHT: 66 IN | SYSTOLIC BLOOD PRESSURE: 112 MMHG | HEART RATE: 72 BPM | OXYGEN SATURATION: 97 % | RESPIRATION RATE: 16 BRPM | DIASTOLIC BLOOD PRESSURE: 75 MMHG | TEMPERATURE: 97.7 F

## 2020-02-04 PROCEDURE — 99283 EMERGENCY DEPT VISIT LOW MDM: CPT

## 2020-02-04 PROCEDURE — 10061 I&D ABSCESS COMP/MULTIPLE: CPT

## 2020-02-04 RX ORDER — DOXYCYCLINE 100 MG/1
100 TABLET ORAL 2 TIMES DAILY
Qty: 20 TABLET | Refills: 0 | Status: SHIPPED | OUTPATIENT
Start: 2020-02-04 | End: 2020-02-14

## 2020-02-04 RX ORDER — CLINDAMYCIN PHOSPHATE 10 MG/G
GEL TOPICAL
Qty: 1 BOTTLE | Refills: 0 | Status: SHIPPED | OUTPATIENT
Start: 2020-02-04 | End: 2020-02-11

## 2020-02-04 ASSESSMENT — PAIN DESCRIPTION - ORIENTATION: ORIENTATION: RIGHT

## 2020-02-04 ASSESSMENT — PAIN SCALES - GENERAL: PAINLEVEL_OUTOF10: 4

## 2020-02-04 ASSESSMENT — PAIN DESCRIPTION - PROGRESSION: CLINICAL_PROGRESSION: NOT CHANGED

## 2020-02-04 ASSESSMENT — PAIN DESCRIPTION - PAIN TYPE: TYPE: ACUTE PAIN

## 2020-02-04 ASSESSMENT — PAIN - FUNCTIONAL ASSESSMENT: PAIN_FUNCTIONAL_ASSESSMENT: ACTIVITIES ARE NOT PREVENTED

## 2020-02-04 ASSESSMENT — PAIN DESCRIPTION - DESCRIPTORS: DESCRIPTORS: ACHING

## 2020-02-04 ASSESSMENT — PAIN DESCRIPTION - ONSET: ONSET: GRADUAL

## 2020-02-04 ASSESSMENT — PAIN DESCRIPTION - FREQUENCY: FREQUENCY: CONTINUOUS

## 2020-02-04 NOTE — ED PROVIDER NOTES
by mouth 3 times daily. Cloyde Call albuterol-ipratropium (COMBIVENT RESPIMAT)  MCG/ACT AERS inhaler Inhale 1 puff into the lungs every 6 hours as needed for Wheezing or Shortness of Breath      hydrocortisone 2.5 % cream Apply 1 each topically 3 times daily as needed (itching/rash)          ALLERGIES    Allergies   Allergen Reactions    Pcn [Penicillins] Shortness Of Breath    Sulfa Antibiotics Shortness Of Breath    Asa [Aspirin] Swelling    Shellfish Allergy Swelling    Tramadol Nausea And Vomiting       SOCIAL & FAMILY HISTORY    Social History     Socioeconomic History    Marital status:       Spouse name: None    Number of children: None    Years of education: None    Highest education level: None   Occupational History    None   Social Needs    Financial resource strain: None    Food insecurity:     Worry: None     Inability: None    Transportation needs:     Medical: None     Non-medical: None   Tobacco Use    Smoking status: Current Every Day Smoker     Packs/day: 0.50     Types: Cigarettes    Smokeless tobacco: Never Used   Substance and Sexual Activity    Alcohol use: No    Drug use: No    Sexual activity: None   Lifestyle    Physical activity:     Days per week: None     Minutes per session: None    Stress: None   Relationships    Social connections:     Talks on phone: None     Gets together: None     Attends Muslim service: None     Active member of club or organization: None     Attends meetings of clubs or organizations: None     Relationship status: None    Intimate partner violence:     Fear of current or ex partner: None     Emotionally abused: None     Physically abused: None     Forced sexual activity: None   Other Topics Concern    None   Social History Narrative    None     Family History   Problem Relation Age of Onset    Hypertension Sister        PHYSICAL EXAM    VITAL SIGNS: /75   Pulse 72   Temp 97.7 °F (36.5 °C) (Oral)   Resp 16   Ht 5' 6\" fasciitis, deep space soft tissue bacterial skin infection, viral rash, systemic infectious rash, aseptic cyst, malignancy, lymphadenopathy, other    Patient is afebrile and nontoxic in appearance. Clindamycin cream was prescribed for patient's history of hydradenitis. Rash is nonspecific on bilateral breasts, most likely self resolving. She can apply the clindamycin cream to this area as well as she has been scratching vigorously at the site and I cannot rule out any bacterial component. I will prescribe her doxycycline orally. I will also referred to a general surgeon as she has a history of hydradenitis. She is in agreement with this plan as well as the plan of discharge. He is remained afebrile and hemodynamically stable and will be discharged home in stable condition. I instructed the patient to follow up in 2 days for wound recheck and to follow up with her PCP and general surgeon referral.  I instructed the patient to return to the ED immediately for any new or worsening symptoms. The patient verbalized understanding. FINAL IMPRESSION    1. Rash and other nonspecific skin eruption    2. Abscess    3.  Hidradenitis        PLAN  Discharge with close outpatient follow-up (see EMR)       (Please note that this note was completed with a voice recognition program.  Every attempt was made to edit the dictations, but inevitably there remain words that are mis-transcribed.)        Mihaela Acuna, AIMEE - CNP  02/04/20 [id number]

## 2020-02-06 ENCOUNTER — TELEPHONE (OUTPATIENT)
Dept: CARDIOLOGY CLINIC | Age: 70
End: 2020-02-06

## 2020-02-06 NOTE — TELEPHONE ENCOUNTER
Renard Boone from [employer] called and stated the patient financial asst.forms are for xarelto, which is not cover for the pt. Xavi Gustafson states that the company does cover for the [employer]   is this an error? Because [employer] will not be cover it? Must resubmit paperwork for entresto please resubmit paperwork.      Xavi Gustafson can be reached at:  [phone]

## 2020-03-13 RX ORDER — SACUBITRIL AND VALSARTAN 24; 26 MG/1; MG/1
TABLET, FILM COATED ORAL
Qty: 60 TABLET | Refills: 5 | OUTPATIENT
Start: 2020-03-13

## 2020-03-17 ENCOUNTER — NURSE ONLY (OUTPATIENT)
Dept: CARDIOLOGY CLINIC | Age: 70
End: 2020-03-17
Payer: MEDICARE

## 2020-03-17 PROCEDURE — 93296 REM INTERROG EVL PM/IDS: CPT | Performed by: INTERNAL MEDICINE

## 2020-03-17 PROCEDURE — 93295 DEV INTERROG REMOTE 1/2/MLT: CPT | Performed by: INTERNAL MEDICINE

## 2020-03-17 NOTE — PROGRESS NOTES
Carelink transmission shows normal sensing and pacing function. See interrogation for more details. Hx NSVT (coreg). Hx PE  (oac). Monitor LV lead threshold. --lead had to be changed d/t dislodgement 1 day after implant. Lead trends remain stable. Effective BP   96.6%. Optivol at baseline. No  arrhythmias recorded. Watch battery depletion. VIRGEN @ 9.6 yrs. Follow up in 3 months via carelink.

## 2020-05-04 RX ORDER — RIVAROXABAN 20 MG/1
TABLET, FILM COATED ORAL
Qty: 30 TABLET | Refills: 3 | Status: SHIPPED | OUTPATIENT
Start: 2020-05-04 | End: 2020-09-11 | Stop reason: SDUPTHER

## 2020-05-22 NOTE — PROGRESS NOTES
abdominal aorta and proximal iliac arteries.      PFT: 10/2018 ([address])  The patient has mild obstructive lung disease with no bronchodilator response seen. Mild restrictive defect. Clinical and radiographic correlation is needed, although this can be explained on the basis of obesity since ERV is reduced to 16%. Minimal reduction in DLCO    Echo: 11/28/18  Suboptimal image quality. Definity contrast administered. Left ventricular cavity size is mildly dilated. Normal left ventricular wall thickness. Overall left ventricular systolic function appears severely reduced. Ejection fraction is visually estimated to be 30%. There is moderate diffuse hypokinesis with severe hypokinesis of the septal wall and apex of the left ventricle. Normal right ventricular size and function. TAPSE measures 21mm. Moderate-to-severe tricuspid regurgitation. No evidence of tricuspid stenosis. IVC not well visualized. Estimated pulmonary artery systolic pressure is normal at 40 mmHg assuming a right atrial pressure of 3 mmHg    Echo: 5/16/19   LV is dilated. LV Ejection fraction is visually estimated to be 20-25%. Severe global hypokinesis with dysynchrony lateral and septal walls. Mild MR. The left atrium is dilated. Assessment and plan:   1) Chronic systolic heart failure. EF 30%, repeat echo showed 25%. S/p Biv-ICD 9/9/19. Appears compensated/ NYHA II-III. Etiology likely non-ischemic with remote normal cath and EF low at that time. Continue with medical therapy. Continue Entresto  mg bid, Coreg 25mg BID, and Lasix PRN. Continue monitoring daily weights. Encouraged a low sodium diet. 2) PAD. Asymptomatic. Continue with medical management and risk factor modification including. Continue Lipitor. ASA listed in allergies. 3) Unprovoked PE. Diagnosed 8/2019. Sister also with unprovoked PEs. Continue Xarelto. 4) COPD/Nicotine Addiction. Encouraged smoking cessation.  May also have chronic NUGENT related to

## 2020-05-26 ENCOUNTER — OFFICE VISIT (OUTPATIENT)
Dept: CARDIOLOGY CLINIC | Age: 70
End: 2020-05-26
Payer: MEDICARE

## 2020-05-26 VITALS
DIASTOLIC BLOOD PRESSURE: 54 MMHG | HEIGHT: 66 IN | TEMPERATURE: 98.1 F | SYSTOLIC BLOOD PRESSURE: 110 MMHG | HEART RATE: 81 BPM | BODY MASS INDEX: 46.12 KG/M2 | WEIGHT: 287 LBS | OXYGEN SATURATION: 97 %

## 2020-05-26 PROCEDURE — 3017F COLORECTAL CA SCREEN DOC REV: CPT | Performed by: INTERNAL MEDICINE

## 2020-05-26 PROCEDURE — 99214 OFFICE O/P EST MOD 30 MIN: CPT | Performed by: INTERNAL MEDICINE

## 2020-05-26 PROCEDURE — G8427 DOCREV CUR MEDS BY ELIG CLIN: HCPCS | Performed by: INTERNAL MEDICINE

## 2020-05-26 PROCEDURE — 1123F ACP DISCUSS/DSCN MKR DOCD: CPT | Performed by: INTERNAL MEDICINE

## 2020-05-26 PROCEDURE — G8400 PT W/DXA NO RESULTS DOC: HCPCS | Performed by: INTERNAL MEDICINE

## 2020-05-26 PROCEDURE — 4004F PT TOBACCO SCREEN RCVD TLK: CPT | Performed by: INTERNAL MEDICINE

## 2020-05-26 PROCEDURE — 1090F PRES/ABSN URINE INCON ASSESS: CPT | Performed by: INTERNAL MEDICINE

## 2020-05-26 PROCEDURE — 4040F PNEUMOC VAC/ADMIN/RCVD: CPT | Performed by: INTERNAL MEDICINE

## 2020-05-26 PROCEDURE — G8417 CALC BMI ABV UP PARAM F/U: HCPCS | Performed by: INTERNAL MEDICINE

## 2020-05-26 NOTE — PATIENT INSTRUCTIONS
Patient Education        Heart-Healthy Diet: Care Instructions  Your Care Instructions     A heart-healthy diet has lots of vegetables, fruits, nuts, beans, and whole grains, and is low in salt. It limits foods that are high in saturated fat, such as meats, cheeses, and fried foods. It may be hard to change your diet, but even small changes can lower your risk of heart attack and heart disease. Follow-up care is a key part of your treatment and safety. Be sure to make and go to all appointments, and call your doctor if you are having problems. It's also a good idea to know your test results and keep a list of the medicines you take. How can you care for yourself at home? Watch your portions  · Learn what a serving is. A \"serving\" and a \"portion\" are not always the same thing. Make sure that you are not eating larger portions than are recommended. For example, a serving of pasta is ½ cup. A serving size of meat is 2 to 3 ounces. A 3-ounce serving is about the size of a deck of cards. Measure serving sizes until you are good at Cassia" them. Keep in mind that restaurants often serve portions that are 2 or 3 times the size of one serving. · To keep your energy level up and keep you from feeling hungry, eat often but in smaller portions. · Eat only the number of calories you need to stay at a healthy weight. If you need to lose weight, eat fewer calories than your body burns (through exercise and other physical activity). Eat more fruits and vegetables  · Eat a variety of fruit and vegetables every day. Dark green, deep orange, red, or yellow fruits and vegetables are especially good for you. Examples include spinach, carrots, peaches, and berries. · Keep carrots, celery, and other veggies handy for snacks. Buy fruit that is in season and store it where you can see it so that you will be tempted to eat it. · Cook dishes that have a lot of veggies in them, such as stir-fries and soups.   Limit saturated and

## 2020-05-28 RX ORDER — CARVEDILOL 25 MG/1
12.5 TABLET ORAL 2 TIMES DAILY WITH MEALS
Qty: 180 TABLET | Refills: 1 | Status: SHIPPED | OUTPATIENT
Start: 2020-05-28 | End: 2021-05-18 | Stop reason: SDUPTHER

## 2020-06-03 RX ORDER — FUROSEMIDE 20 MG/1
20 TABLET ORAL PRN
Qty: 90 TABLET | Refills: 3 | Status: SHIPPED | OUTPATIENT
Start: 2020-06-03 | End: 2020-08-03 | Stop reason: SDUPTHER

## 2020-06-24 ENCOUNTER — NURSE ONLY (OUTPATIENT)
Dept: CARDIOLOGY CLINIC | Age: 70
End: 2020-06-24
Payer: MEDICARE

## 2020-06-24 PROCEDURE — 93296 REM INTERROG EVL PM/IDS: CPT | Performed by: INTERNAL MEDICINE

## 2020-06-24 PROCEDURE — 93295 DEV INTERROG REMOTE 1/2/MLT: CPT | Performed by: INTERNAL MEDICINE

## 2020-07-01 ENCOUNTER — TELEPHONE (OUTPATIENT)
Dept: CARDIOLOGY CLINIC | Age: 70
End: 2020-07-01

## 2020-07-01 NOTE — TELEPHONE ENCOUNTER
Spoke w/ pt, states she has been having MILKA feet and ankle swelling for about 1 week and a half. Pt denies CP or SOB at this time. Pt states she has been putting on her compression stockings but not consistently. She also has been elevating her legs at night that has helped somewhat. Per pt her B/P have been running 100/62 (yesterday) and this morning it was 103/67. She is taking Lasix 20mg daily as prescribed. Last OV note 2/16/59: Chronic systolic heart failure.  EF25%- 30%, S/p Biv-ICD 9/9/19, PAD, Unprovoked PE, COPD/Nicotine Addiction, NSVT, Essential hypertension,  Chronic pain

## 2020-07-01 NOTE — TELEPHONE ENCOUNTER
Dr. Neida Martinez, pt calling with reports of ankle/foot edema, off and on weight gain. She is on Entresto  BID and Lasix 20 mg daily PRN. Called patient to discuss. She took a dose of Lasix yesterday. I asked that she take a dose today, tomorrow ONLY and repeat BMP tomorrow. (last done 9/2019). Informed that she should be mindful of the Entresto dose and monitor for any signs of low BP, dizziness/lightheadedness. Explained that the high dose of Gabapentin could be causing some of the edema and suggested she call provider to see if the dose could be reduced. She verbalized understanding to all. BMP ordered. Please advise if you have other recommendations, thanks.

## 2020-07-01 NOTE — TELEPHONE ENCOUNTER
Dr. Ralene Bloch, thoracic impedence is actually down from 6/24/20 when last transmission was sent. Anything further?

## 2020-07-01 NOTE — TELEPHONE ENCOUNTER
Called patient to inform that she doesn't need labs tomorrow. Thoracic impedence was WNL on 6/24/20 however, I spoke with Roni Barrera who reviewed it and it was on an upward trend. He will upload another transmission to check current status.

## 2020-08-03 RX ORDER — FUROSEMIDE 20 MG/1
20 TABLET ORAL PRN
Qty: 90 TABLET | Refills: 3 | Status: SHIPPED | OUTPATIENT
Start: 2020-08-03 | End: 2021-03-31 | Stop reason: SDUPTHER

## 2020-08-03 RX ORDER — ATORVASTATIN CALCIUM 10 MG/1
10 TABLET, FILM COATED ORAL NIGHTLY
Qty: 90 TABLET | Refills: 3 | Status: SHIPPED | OUTPATIENT
Start: 2020-08-03 | End: 2020-09-18 | Stop reason: SDUPTHER

## 2020-08-03 NOTE — TELEPHONE ENCOUNTER
Please refill in Dr. Jessi Cole absence, thank you  Talat Araya is requesting a #90 day supply  Last OV: 5/26/20  Next OV: 11/17/20  Labs: DOMINGO

## 2020-09-18 RX ORDER — SACUBITRIL AND VALSARTAN 97; 103 MG/1; MG/1
TABLET, FILM COATED ORAL
Qty: 180 TABLET | Refills: 3 | Status: SHIPPED | OUTPATIENT
Start: 2020-09-18 | End: 2021-10-05

## 2020-09-18 RX ORDER — ATORVASTATIN CALCIUM 10 MG/1
10 TABLET, FILM COATED ORAL NIGHTLY
Qty: 90 TABLET | Refills: 3 | Status: SHIPPED | OUTPATIENT
Start: 2020-09-18 | End: 2021-05-18 | Stop reason: SDUPTHER

## 2020-09-28 ENCOUNTER — NURSE ONLY (OUTPATIENT)
Dept: CARDIOLOGY CLINIC | Age: 70
End: 2020-09-28
Payer: MEDICARE

## 2020-09-28 PROCEDURE — 93295 DEV INTERROG REMOTE 1/2/MLT: CPT | Performed by: INTERNAL MEDICINE

## 2020-09-28 PROCEDURE — 93297 REM INTERROG DEV EVAL ICPMS: CPT | Performed by: INTERNAL MEDICINE

## 2020-09-28 PROCEDURE — 93296 REM INTERROG EVL PM/IDS: CPT | Performed by: INTERNAL MEDICINE

## 2020-09-28 NOTE — PROGRESS NOTES
We received remote transmission from patient's monitor at home. Transmission shows normal sensing and pacing function. EP physician will review. See interrogation under cardiology tab in the [address] [facility] [address] field for more details. Optivol is within normal range.

## 2020-09-28 NOTE — LETTER
[address] [phone]  [address] [facility] [phone]    Pacemaker/Defibrillator Clinic          09/28/20        Singh Pabonfel  [address] [facility] [address]        Dear Henrique Ceja    This letter is to inform you that we received the transmission from your monitor at home that checks your implanted heart device. The next date your monitor will automatically transmit will be 12-29-20. If your report needs attention we will notify you. Your device and monitor are wireless and most transmit cellularly, but please periodically check your monitor is still plugged in to the electrical outlet. If you still use the telephone land line to send please ensure the connection to the phone juan manuel is secure. This will help to ensure successful automatic transmissions in the future. Also, the monitor needs to be close to you while sleeping at night. Please be aware that the remote device transmission sites are periodically monitored only during regular business hours during which simultaneous in-office device clinics are being run. If your transmission requires attention, we will contact you as soon as possible. Thank you.             [facility]

## 2020-10-01 ENCOUNTER — TELEPHONE (OUTPATIENT)
Dept: CARDIOLOGY CLINIC | Age: 70
End: 2020-10-01

## 2020-10-01 NOTE — TELEPHONE ENCOUNTER
Pt called in stating her lung doctor prescribed an inhaler called Maryan Bailon. She wants to make sure this is okay to take with her heart problems.     You can reach out to her at [phone]

## 2020-10-21 ENCOUNTER — HOSPITAL ENCOUNTER (OUTPATIENT)
Dept: WOMENS IMAGING | Age: 70
Discharge: HOME | End: 2020-10-21
Payer: MEDICARE

## 2020-10-21 PROCEDURE — 77067 SCR MAMMO BI INCL CAD: CPT

## 2020-11-16 NOTE — PROGRESS NOTES
[facility]      Cardiology Consult    Xavi Guzman  1950 November 17, 2020    Reason for Visit: CHF    CC: \"Weaning down the Neurontin\"    HPI:  The patient is 69 y.o. female with a past medical history significant for COPD, CHF, PE, and HTN who presents today for management of heart failure. She presented to the hospital 11/2018 with complaints of progressive shortness of breath. The NUGENT worsened over several weeks to months but acutely worsened in the few days prior to admission. She was also diagnosed with COPD. The echo showed an EF of 30%. She had an angiogram in 2004 which showed a reduced EF but she was unaware of that finding. She was diuresed and the shortness of breath improved. She was admitted to [facility] 8/7/19 and was found to have a PE. She was discharged home on Xarelto. Her repeat echo showed an LVEF of 25%. She was evaluated by Dr. Catina Paiz and underwent BiV-ICD placement 9/9/19. The following day her device was interrogated and her CS lead was dislodged. She was taken to the cath lab for a lead revision 9/10/19. Today, she denies any new cardiac sounding complaints. She states overall she is feel well. She states her weight continue to fluctuate but relatively remaining stable. She reports compliance with her medications and tolerating. She utilizes a cane with ambulation and denies any recent falls. She denies any claudication symptoms. She reported worsening LE edema that improved when she reduced the dose of her gabapentin. Patient denies exertional chest pain/pressure, dyspnea at rest, worsening NUGENT, PND, orthopnea, palpitations, lightheadedness, weight changes, and syncope.     Past Medical History:   Diagnosis Date    CHF (congestive heart failure) (HCC)     COPD (chronic obstructive pulmonary disease) (HCC)     Hypertension      Past Surgical History:   Procedure Laterality Date    CARPAL TUNNEL RELEASE      CHOLECYSTECTOMY      KNEE SURGERY      PACEMAKER INSERTION      PACEMAKER PLACEMENT      TONSILLECTOMY      TUBAL LIGATION       Family History   Problem Relation Age of Onset    Hypertension Sister      Social History     Tobacco Use    Smoking status: Current Every Day Smoker     Packs/day: 0.50     Types: Cigarettes    Smokeless tobacco: Never Used   Substance Use Topics    Alcohol use: No    Drug use: No       Allergies   Allergen Reactions    Pcn [Penicillins] Shortness Of Breath    Sulfa Antibiotics Shortness Of Breath    Asa [Aspirin] Swelling    Shellfish Allergy Swelling    Tramadol Nausea And Vomiting     Current Outpatient Medications   Medication Sig Dispense Refill    atorvastatin (LIPITOR) 10 MG tablet Take 1 tablet by mouth nightly 90 tablet 3    sacubitril-valsartan (ENTRESTO)  MG per tablet TAKE 1 TABLET BY MOUTH TWICE DAILY 180 tablet 3    rivaroxaban (XARELTO) 20 MG TABS tablet TAKE 1 TABLET BY MOUTH DAILY WITH SUPPER 30 tablet 3    furosemide (LASIX) 20 MG tablet Take 1 tablet by mouth as needed (as needed for weight gain) 90 tablet 3    carvedilol (COREG) 25 MG tablet Take 0.5 tablets by mouth 2 times daily (with meals) Hold for SBP<100 180 tablet 1    Probiotic Product (PROBIOTIC DAILY PO) Take by mouth every other day      loratadine (CLARITIN) 10 MG tablet Take 10 mg by mouth daily as needed      acetaminophen (TYLENOL) 500 MG tablet Take 500 mg by mouth every 6 hours as needed for Pain Max 4,000mg total acetaminophen per 24 hours.       magnesium (MAGNESIUM-OXIDE) 250 MG TABS tablet Take 250 mg by mouth daily      Biotin 5000 MCG TABS Take 1 tablet by mouth daily      vitamin D (CHOLECALCIFEROL) 5000 units CAPS capsule Take 5,000 Units by mouth daily      vitamin B-12 (CYANOCOBALAMIN) 1000 MCG tablet Take 1,000 mcg by mouth daily      Vitamin A 8000 units TABS Take 1 tablet by mouth daily      vitamin C (ASCORBIC ACID) 500 MG tablet Take 500 mg by mouth daily      Cranberry-Vitamin C (CRANBERRY CONCENTRATE/VITAMINC PO) Take 2 capsules by mouth daily 25,200mg  With vit C 40mg      Multiple Vitamins-Minerals (CENTRUM SILVER PO) Take 1 tablet by mouth daily      latanoprost (XALATAN) 0.005 % ophthalmic solution Place 1 drop into both eyes nightly      gabapentin (NEURONTIN) 800 MG tablet Take 400 mg by mouth 3 times daily.  albuterol-ipratropium (COMBIVENT RESPIMAT)  MCG/ACT AERS inhaler Inhale 1 puff into the lungs every 6 hours as needed for Wheezing or Shortness of Breath      hydrocortisone 2.5 % cream Apply 1 each topically 3 times daily as needed (itching/rash)        No current facility-administered medications for this visit. Review of Systems:  · Constitutional: no unanticipated weight loss. There's been no change in energy level, sleep pattern, or activity level. No fevers, chills. · Eyes: No visual changes or diplopia. No scleral icterus. · ENT: No Headaches, hearing loss or vertigo. No mouth sores or sore throat. · Cardiovascular: as reviewed in HPI  · Respiratory: No cough or wheezing, no sputum production. No hematemesis. · Gastrointestinal: No abdominal pain, appetite loss, blood in stools. No change in bowel or bladder habits. · Genitourinary: No dysuria, trouble voiding, or hematuria. · Musculoskeletal:  No gait disturbance, no joint complaints. · Integumentary: No rash or pruritis. · Neurological: No headache, diplopia, change in muscle strength, numbness or tingling. · Psychiatric: No anxiety or depression. · Endocrine: No temperature intolerance. No excessive thirst, fluid intake, or urination. No tremor. · Hematologic/Lymphatic: No abnormal bruising or bleeding, blood clots or swollen lymph nodes. · Allergic/Immunologic: No nasal congestion or hives.     Physical Exam:   /76 (Site: Left Upper Arm, Position: Sitting, Cuff Size: Medium Adult)   Pulse 72   Temp 97.7 °F (36.5 °C)   Ht 5' 6\" (1.676 m)   Wt 283 lb 1.6 oz (128.4 kg) Comment: with shoes  SpO2 98%   BMI 45.69 kg/m²   Wt Readings from Last 3 Encounters:   11/17/20 283 lb 1.6 oz (128.4 kg)   05/26/20 287 lb (130.2 kg)   02/04/20 283 lb 9.6 oz (128.6 kg)     Constitutional: The patient is an obese female who is oriented to person, place, and time. In no acute distress. Head: Normocephalic and atraumatic. Pupils equal and round. Neck: Neck supple. No JVP or carotid bruit appreciated. No mass and no thyromegaly present. No lymphadenopathy present. Cardiovascular: Normal rate. Normal heart sounds. Exam reveals no gallop and no friction rub. No murmur heard. Pulmonary/Chest: Effort normal and breath sounds normal. No respiratory distress. No wheezes, rhonchi or rales. Abdominal: Soft, non-tender. Bowel sounds are normal. Exhibits no organomegaly, mass or bruit. Extremities: No edema. No cyanosis or clubbing. Pulses are 2+ radial and carotid bilaterally. Neurological: No gross cranial nerve deficit. Coordination normal.   Skin: Skin is warm and dry. There is no rash or diaphoresis. Psychiatric: Patient has a normal mood and affect. Speech is normal and behavior is normal.     Lab Review:   FLP:    Lab Results   Component Value Date    TRIG 81 11/27/2018    HDL 55 11/27/2018    LDLCALC 89 11/27/2018    LABVLDL 16 11/27/2018     BUN/Creatinine:    Lab Results   Component Value Date    BUN 20 09/11/2019    CREATININE 1.2 09/11/2019       EKG Interpretation: 11/26/18 Reviewed, Sinus rhythm with premature supraventricular complexes. Left bundle branch block  11/17/20 V-paced    Imaging:  Cath: 2004 ([place])  The left main coronary artery was angiographically normal.   The left anterior descending artery is widely patent, tapers relatively quickly, but is without significant disease.   The circumflex coronary artery is a dominant vessel and is angiographically normal.  The right coronary artery is small, technically nondominant, and is angiographically normal.  A left ventriculogram shows mild diffuse hypokinesis. LVEF is estimated at 45%. There is no mitral insufficiency. There is no gradient on pullback across the aortic valve.     Abd CT: 10/2015  Atherosclerotic calcifications are present within   the abdominal aorta and proximal iliac arteries.      [place]  The patient has mild obstructive lung disease with no bronchodilator response seen. Mild restrictive defect. Clinical and radiographic correlation is needed, although this can be explained on the basis of obesity since ERV is reduced to 16%. Minimal reduction in DLCO    Echo: 11/28/18  Suboptimal image quality. Definity contrast administered. Left ventricular cavity size is mildly dilated. Normal left ventricular wall thickness. Overall left ventricular systolic function appears severely reduced. Ejection fraction is visually estimated to be 30%. There is moderate diffuse hypokinesis with severe hypokinesis of the septal wall and apex of the left ventricle. Normal right ventricular size and function. TAPSE measures 21mm. Moderate-to-severe tricuspid regurgitation. No evidence of tricuspid stenosis. IVC not well visualized. Estimated pulmonary artery systolic pressure is normal at 40 mmHg assuming a right atrial pressure of 3 mmHg    Echo: 5/16/19   LV is dilated. LV Ejection fraction is visually estimated to be 20-25%. Severe global hypokinesis with dysynchrony lateral and septal walls. Mild MR. The left atrium is dilated. Assessment and plan:   1) Chronic systolic heart failure. EF 30%, repeat echo showed 25%. S/p Biv-ICD 9/9/19. Appears compensated/ NYHA II-III. Etiology likely non-ischemic with remote normal cath and EF low at that time. Device interrogation today showed normal functioning but Optivol up-trending. Continue Entresto  mg bid, Coreg 25mg BID, and instructed to take the Lasix for a few days and the resume PRN. Continue monitoring daily weights. Encouraged a low sodium diet. 2) PAD. Asymptomatic. Continue with medical management and risk factor modification including. Continue Lipitor. ASA listed in allergies. 3) Unprovoked PE. Diagnosed 8/2019. Sister also with unprovoked PEs. Continue Xarelto. 4) COPD/Nicotine Addiction. Encouraged smoking cessation. May also have chronic NUGENT related to COPD. 5) NSVT. Continue B-blocker. Asymptomatic. 6) Essential hypertension. Controlled. Goal BP <130/80. Continue medical therapy. 7) Morbid obesity. BMI 45.6. Encouraged weight loss. 8) Chronic pain. Instructed to avoid the use of NSAIDs if possible. Acetaminophen is preferred agent but will defer pain management to PCP.      Follow up in 6 months. Thank you very much for allowing me to participate in the care of your patient. Please do not hesitate to contact me if you have any questions. Sincerely,  Man Alcala. Yao Aldana, [address] [facility], [address] [place] [address]  Ph: [phone]  Fax: [phone]    This note was scribed in the presence of Dr Yao Aldana MD by Heaven Tong RN. Physician Attestation: The scribes documentation has been prepared under my direction and personally reviewed by me in its entirety. I confirm that the note above accurately reflects all work, treatment, procedures, and medical decision making performed by me. All portions of the note including but not limited to the chief complaint, history of present illness, physical exam, assessment and plan/medical decision making were personally reviewed, edited, and updated on the day of the visit.

## 2020-11-16 NOTE — PATIENT INSTRUCTIONS
Patient Education        DASH Diet: Care Instructions  Your Care Instructions     The DASH diet is an eating plan that can help lower your blood pressure. DASH stands for Dietary Approaches to Stop Hypertension. Hypertension is high blood pressure. The DASH diet focuses on eating foods that are high in calcium, potassium, and magnesium. These nutrients can lower blood pressure. The foods that are highest in these nutrients are fruits, vegetables, low-fat dairy products, nuts, seeds, and legumes. But taking calcium, potassium, and magnesium supplements instead of eating foods that are high in those nutrients does not have the same effect. The DASH diet also includes whole grains, fish, and poultry. The DASH diet is one of several lifestyle changes your doctor may recommend to lower your high blood pressure. Your doctor may also want you to decrease the amount of sodium in your diet. Lowering sodium while following the DASH diet can lower blood pressure even further than just the DASH diet alone. Follow-up care is a key part of your treatment and safety. Be sure to make and go to all appointments, and call your doctor if you are having problems. It's also a good idea to know your test results and keep a list of the medicines you take. How can you care for yourself at home? Following the DASH diet  · Eat 4 to 5 servings of fruit each day. A serving is 1 medium-sized piece of fruit, ½ cup chopped or canned fruit, 1/4 cup dried fruit, or 4 ounces (½ cup) of fruit juice. Choose fruit more often than fruit juice. · Eat 4 to 5 servings of vegetables each day. A serving is 1 cup of lettuce or raw leafy vegetables, ½ cup of chopped or cooked vegetables, or 4 ounces (½ cup) of vegetable juice. Choose vegetables more often than vegetable juice. · Get 2 to 3 servings of low-fat and fat-free dairy each day. A serving is 8 ounces of milk, 1 cup of yogurt, or 1 ½ ounces of cheese. · Eat 6 to 8 servings of grains each day. A serving is 1 slice of bread, 1 ounce of dry cereal, or ½ cup of cooked rice, pasta, or cooked cereal. Try to choose whole-grain products as much as possible. · Limit lean meat, poultry, and fish to 2 servings each day. A serving is 3 ounces, about the size of a deck of cards. · Eat 4 to 5 servings of nuts, seeds, and legumes (cooked dried beans, lentils, and split peas) each week. A serving is 1/3 cup of nuts, 2 tablespoons of seeds, or ½ cup of cooked beans or peas. · Limit fats and oils to 2 to 3 servings each day. A serving is 1 teaspoon of vegetable oil or 2 tablespoons of salad dressing. · Limit sweets and added sugars to 5 servings or less a week. A serving is 1 tablespoon jelly or jam, ½ cup sorbet, or 1 cup of lemonade. · Eat less than 2,300 milligrams (mg) of sodium a day. If you limit your sodium to 1,500 mg a day, you can lower your blood pressure even more. Tips for success  · Start small. Do not try to make dramatic changes to your diet all at once. You might feel that you are missing out on your favorite foods and then be more likely to not follow the plan. Make small changes, and stick with them. Once those changes become habit, add a few more changes. · Try some of the following:  ? Make it a goal to eat a fruit or vegetable at every meal and at snacks. This will make it easy to get the recommended amount of fruits and vegetables each day. ? Try yogurt topped with fruit and nuts for a snack or healthy dessert. ? Add lettuce, tomato, cucumber, and onion to sandwiches. ? Combine a ready-made pizza crust with low-fat mozzarella cheese and lots of vegetable toppings. Try using tomatoes, squash, spinach, broccoli, carrots, cauliflower, and onions. ? Have a variety of cut-up vegetables with a low-fat dip as an appetizer instead of chips and dip. ? Sprinkle sunflower seeds or chopped almonds over salads. Or try adding chopped walnuts or almonds to cooked vegetables.   ? Try some vegetarian meals using beans and peas. Add garbanzo or kidney beans to salads. Make burritos and tacos with mashed smith beans or black beans. Where can you learn more? Go to https://chneetaeb.[employer]. org and sign in to your [employer] account. Enter J595 in the [employer] box to learn more about \"DASH Diet: Care Instructions. \"     If you do not have an account, please click on the \"Sign Up Now\" link. Current as of: December 16, 2019               Content Version: 12.6  © 0976-1403 [employer]. Care instructions adapted under license by [facility] ([facility]). If you have questions about a medical condition or this instruction, always ask your healthcare professional. [address] any warranty or liability for your use of this information.

## 2020-11-17 ENCOUNTER — OFFICE VISIT (OUTPATIENT)
Dept: CARDIOLOGY CLINIC | Age: 70
End: 2020-11-17
Payer: MEDICARE

## 2020-11-17 ENCOUNTER — NURSE ONLY (OUTPATIENT)
Dept: CARDIOLOGY CLINIC | Age: 70
End: 2020-11-17
Payer: MEDICARE

## 2020-11-17 VITALS
HEIGHT: 66 IN | TEMPERATURE: 97.7 F | SYSTOLIC BLOOD PRESSURE: 114 MMHG | DIASTOLIC BLOOD PRESSURE: 76 MMHG | OXYGEN SATURATION: 98 % | HEART RATE: 72 BPM | WEIGHT: 283.1 LBS | BODY MASS INDEX: 45.5 KG/M2

## 2020-11-17 PROCEDURE — 93284 PRGRMG EVAL IMPLANTABLE DFB: CPT | Performed by: INTERNAL MEDICINE

## 2020-11-17 PROCEDURE — 3017F COLORECTAL CA SCREEN DOC REV: CPT | Performed by: INTERNAL MEDICINE

## 2020-11-17 PROCEDURE — G8417 CALC BMI ABV UP PARAM F/U: HCPCS | Performed by: INTERNAL MEDICINE

## 2020-11-17 PROCEDURE — 4004F PT TOBACCO SCREEN RCVD TLK: CPT | Performed by: INTERNAL MEDICINE

## 2020-11-17 PROCEDURE — G8427 DOCREV CUR MEDS BY ELIG CLIN: HCPCS | Performed by: INTERNAL MEDICINE

## 2020-11-17 PROCEDURE — G8400 PT W/DXA NO RESULTS DOC: HCPCS | Performed by: INTERNAL MEDICINE

## 2020-11-17 PROCEDURE — G8484 FLU IMMUNIZE NO ADMIN: HCPCS | Performed by: INTERNAL MEDICINE

## 2020-11-17 PROCEDURE — 1090F PRES/ABSN URINE INCON ASSESS: CPT | Performed by: INTERNAL MEDICINE

## 2020-11-17 PROCEDURE — 4040F PNEUMOC VAC/ADMIN/RCVD: CPT | Performed by: INTERNAL MEDICINE

## 2020-11-17 PROCEDURE — 1123F ACP DISCUSS/DSCN MKR DOCD: CPT | Performed by: INTERNAL MEDICINE

## 2020-11-17 PROCEDURE — 93290 INTERROG DEV EVAL ICPMS IP: CPT | Performed by: INTERNAL MEDICINE

## 2020-11-17 PROCEDURE — 99214 OFFICE O/P EST MOD 30 MIN: CPT | Performed by: INTERNAL MEDICINE

## 2020-11-17 NOTE — PROGRESS NOTES
Pt seen in clinic today for an overdue annual cardiac device interrogation. Their device is an  MDT 3 chamber BiV ICD  Based on threshold, impedance, and intrinsic sensing tests run today, the device appears to be functioning normally. Remaining battery life is 5y  AP 0.78%                CRTP 97.49%      Optivol is trending up    Rx:  rivaroxaban (XARELTO) 20 MG TABS tablet TAKE 1 TABLET BY MOUTH DAILY WITH SUPPER   furosemide (LASIX) 20 MG tablet Take 1 tablet by mouth as needed (as needed for weight gain)   carvedilol (COREG) 25 MG tablet Take 0.5 tablets by mouth 2 times daily (with meals) Hold for SBP<100    Pt was informed of findings today and general questions have been answered with regard to device. Home monitoring hardware is transmitting on schedule. Results discussed with or to be reviewed by Dr. Vika Mayer    Pt to see Dr. Jeff Jensen in clinic today following their device check.

## 2021-01-13 NOTE — TELEPHONE ENCOUNTER
Medication Refill    Medication needing refilled:  rivaroxaban (XARELTO)      Dosage of the medication:  20 MG TABS tablet       How are you taking this medication (QD, BID, TID, QID, PRN):  TAKE 1 TABLET BY MOUTH DAILY WITH SUPPER    30 or 90 day supply called in:      Which Pharmacy are we sending the medication to?:    [facility] [address] [place] [id number]   Phone:  [phone]  Fax:  [phone]

## 2021-02-18 ENCOUNTER — NURSE ONLY (OUTPATIENT)
Dept: CARDIOLOGY CLINIC | Age: 71
End: 2021-02-18
Payer: MEDICARE

## 2021-02-18 DIAGNOSIS — I50.23 ACUTE ON CHRONIC SYSTOLIC HEART FAILURE, NYHA CLASS 3 (HCC): ICD-10-CM

## 2021-02-18 DIAGNOSIS — I42.8 NONISCHEMIC CARDIOMYOPATHY (HCC): ICD-10-CM

## 2021-02-18 DIAGNOSIS — Z95.810 BIVENTRICULAR ICD (IMPLANTABLE CARDIOVERTER-DEFIBRILLATOR) IN PLACE: ICD-10-CM

## 2021-02-18 PROCEDURE — 93296 REM INTERROG EVL PM/IDS: CPT | Performed by: INTERNAL MEDICINE

## 2021-02-18 PROCEDURE — 93295 DEV INTERROG REMOTE 1/2/MLT: CPT | Performed by: INTERNAL MEDICINE

## 2021-02-18 PROCEDURE — 93297 REM INTERROG DEV EVAL ICPMS: CPT | Performed by: INTERNAL MEDICINE

## 2021-02-18 NOTE — PROGRESS NOTES
We received remote transmission from patient's monitor at home. Transmission shows normal sensing and pacing function. EP physician will review. See interrogation under cardiology tab in the [address] [facility] [address] field for more details. Optivol is within normal range.

## 2021-02-18 NOTE — LETTER
[address] [facility] [phone]  Luige Fabian [address] [place] [address] [phone]    Pacemaker/Defibrillator Clinic          02/18/21        Yamileth Garg Siffel  [address] [place] [address]        Dear James Ceja    This letter is to inform you that we received the transmission from your monitor at home that checks your implanted heart device. The next date your monitor will automatically transmit will be 5-24-21. If your report needs attention we will notify you. Your device and monitor are wireless and most transmit cellularly, but please periodically check your monitor is still plugged in to the electrical outlet. If you still use the telephone land line to send please ensure the connection to the phone juan manuel is secure. This will help to ensure successful automatic transmissions in the future. Also, the monitor needs to be close to you while sleeping at night. Please be aware that the remote device transmission sites are periodically monitored only during regular business hours during which simultaneous in-office device clinics are being run. If your transmission requires attention, we will contact you as soon as possible. Thank you.             Lin [address]

## 2021-03-17 ENCOUNTER — TELEPHONE (OUTPATIENT)
Dept: CARDIOLOGY CLINIC | Age: 71
End: 2021-03-17

## 2021-03-17 NOTE — TELEPHONE ENCOUNTER
Belem Kelly called in this morning wanting to know if Dr. July Palencia can write her 2 scripts for a handicap sticker?        You can reach Belem Kelly at #[phone]

## 2021-03-31 RX ORDER — FUROSEMIDE 20 MG/1
20 TABLET ORAL PRN
Qty: 90 TABLET | Refills: 3 | Status: SHIPPED | OUTPATIENT
Start: 2021-03-31 | End: 2022-03-28

## 2021-05-18 ENCOUNTER — OFFICE VISIT (OUTPATIENT)
Dept: CARDIOLOGY CLINIC | Age: 71
End: 2021-05-18
Payer: MEDICARE

## 2021-05-18 ENCOUNTER — NURSE ONLY (OUTPATIENT)
Dept: CARDIOLOGY CLINIC | Age: 71
End: 2021-05-18
Payer: MEDICARE

## 2021-05-18 VITALS
BODY MASS INDEX: 44.97 KG/M2 | OXYGEN SATURATION: 98 % | SYSTOLIC BLOOD PRESSURE: 102 MMHG | HEART RATE: 69 BPM | WEIGHT: 279.8 LBS | DIASTOLIC BLOOD PRESSURE: 70 MMHG | HEIGHT: 66 IN

## 2021-05-18 DIAGNOSIS — I10 ESSENTIAL HYPERTENSION: ICD-10-CM

## 2021-05-18 DIAGNOSIS — I47.29 NSVT (NONSUSTAINED VENTRICULAR TACHYCARDIA): ICD-10-CM

## 2021-05-18 DIAGNOSIS — F17.218 CIGARETTE NICOTINE DEPENDENCE WITH OTHER NICOTINE-INDUCED DISORDER: ICD-10-CM

## 2021-05-18 DIAGNOSIS — I42.8 NONISCHEMIC CARDIOMYOPATHY (HCC): ICD-10-CM

## 2021-05-18 DIAGNOSIS — I50.22 CHRONIC LEFT SYSTOLIC HEART FAILURE (HCC): ICD-10-CM

## 2021-05-18 DIAGNOSIS — I73.9 PAD (PERIPHERAL ARTERY DISEASE) (HCC): ICD-10-CM

## 2021-05-18 DIAGNOSIS — E66.01 MORBID OBESITY WITH BMI OF 45.0-49.9, ADULT (HCC): ICD-10-CM

## 2021-05-18 DIAGNOSIS — I50.22 CHRONIC LEFT SYSTOLIC HEART FAILURE (HCC): Primary | ICD-10-CM

## 2021-05-18 DIAGNOSIS — Z95.810 BIVENTRICULAR ICD (IMPLANTABLE CARDIOVERTER-DEFIBRILLATOR) IN PLACE: ICD-10-CM

## 2021-05-18 DIAGNOSIS — Z95.810 IMPLANTABLE CARDIOVERTER-DEFIBRILLATOR (ICD) IN SITU: ICD-10-CM

## 2021-05-18 DIAGNOSIS — I50.23 ACUTE ON CHRONIC SYSTOLIC HEART FAILURE, NYHA CLASS 3 (HCC): ICD-10-CM

## 2021-05-18 PROCEDURE — 99214 OFFICE O/P EST MOD 30 MIN: CPT | Performed by: INTERNAL MEDICINE

## 2021-05-18 PROCEDURE — G8400 PT W/DXA NO RESULTS DOC: HCPCS | Performed by: INTERNAL MEDICINE

## 2021-05-18 PROCEDURE — G8417 CALC BMI ABV UP PARAM F/U: HCPCS | Performed by: INTERNAL MEDICINE

## 2021-05-18 PROCEDURE — 1123F ACP DISCUSS/DSCN MKR DOCD: CPT | Performed by: INTERNAL MEDICINE

## 2021-05-18 PROCEDURE — 1090F PRES/ABSN URINE INCON ASSESS: CPT | Performed by: INTERNAL MEDICINE

## 2021-05-18 PROCEDURE — 3017F COLORECTAL CA SCREEN DOC REV: CPT | Performed by: INTERNAL MEDICINE

## 2021-05-18 PROCEDURE — 93284 PRGRMG EVAL IMPLANTABLE DFB: CPT | Performed by: INTERNAL MEDICINE

## 2021-05-18 PROCEDURE — G8427 DOCREV CUR MEDS BY ELIG CLIN: HCPCS | Performed by: INTERNAL MEDICINE

## 2021-05-18 PROCEDURE — 93290 INTERROG DEV EVAL ICPMS IP: CPT | Performed by: INTERNAL MEDICINE

## 2021-05-18 PROCEDURE — 4040F PNEUMOC VAC/ADMIN/RCVD: CPT | Performed by: INTERNAL MEDICINE

## 2021-05-18 PROCEDURE — 4004F PT TOBACCO SCREEN RCVD TLK: CPT | Performed by: INTERNAL MEDICINE

## 2021-05-18 RX ORDER — GABAPENTIN 600 MG/1
600 TABLET ORAL 3 TIMES DAILY
COMMUNITY

## 2021-05-18 RX ORDER — CARVEDILOL 12.5 MG/1
12.5 TABLET ORAL 2 TIMES DAILY WITH MEALS
Qty: 180 TABLET | Refills: 2 | Status: SHIPPED | OUTPATIENT
Start: 2021-05-18 | End: 2021-12-23

## 2021-05-18 RX ORDER — ATORVASTATIN CALCIUM 10 MG/1
10 TABLET, FILM COATED ORAL NIGHTLY
Qty: 90 TABLET | Refills: 3 | Status: SHIPPED | OUTPATIENT
Start: 2021-05-18 | End: 2022-03-28

## 2021-05-18 NOTE — PROGRESS NOTES
Pt seen in clinic today for an overdue annual cardiac device interrogation. Their device is an  MDT 3 chamber BiV ICD  Based on threshold, impedance, and intrinsic sensing tests run today, the device appears to be functioning with minimal deviation from established trends. Remaining battery life is 6y11m  AP 0.28%      RVP 19.25%          CRTP 98.23%      Optivol is stable nad near baseline    Rx:  rivaroxaban (XARELTO) 20 MG TABS tablet TAKE 1 TABLET BY MOUTH DAILY WITH SUPPER   furosemide (LASIX) 20 MG tablet Take 1 tablet by mouth as needed (as needed for weight gain)   carvedilol (COREG) 25 MG tablet Take 0.5 tablets by mouth 2 times daily (with meals) Hold for SBP<100    Pt was informed of findings today and general questions have been answered with regard to device. Home monitoring hardware is transmitting on schedule. Results discussed with or to be reviewed by Dr. Gladys Hall    Pt to see Dr. Jonathan Lozoya in clinic today following their device check.

## 2021-05-18 NOTE — PROGRESS NOTES
1951 [facility] Beronica Pabonfel  1950    May 18, 2021    Reason for Visit: CHF    CC: \"Seem the same. \"     HPI:  The patient is 66 y.o. female with a past medical history significant for COPD, CHF, PE, and HTN who presents today for management of heart failure. She presented to the hospital 11/2018 with complaints of progressive shortness of breath. The NUGENT worsened over several weeks to months but acutely worsened in the few days prior to admission. She was also diagnosed with COPD. The echo showed an EF of 30%. She had an angiogram in 2004 which showed a reduced EF but she was unaware of that finding. She was diuresed and the shortness of breath improved. She was admitted to [facility] 8/7/19 and was found to have a PE. She was discharged home on Xarelto. Her repeat echo showed an LVEF of 25%. She was evaluated by Dr. Archie Munguia and underwent BiV-ICD placement 9/9/19. The following day her device was interrogated and her CS lead was dislodged. She was taken to the cath lab for a lead revision 9/10/19. Today, she denies any new cardiac sounding complaints. She states overall she is feeling well. She states her weight remains relatively stable and denies any worsening of her chronic NUGENT. She reports compliance with her medications and tolerating. She utilizes a rolling walker with ambulation and denies any recent falls. She denies any claudication symptoms. Patient denies exertional chest pain/pressure, dyspnea at rest, worsening NUGENT, PND, orthopnea, palpitations, lightheadedness, weight changes, changes in LE edema, and syncope.     Past Medical History:   Diagnosis Date    CHF (congestive heart failure) (HCC)     COPD (chronic obstructive pulmonary disease) (HCC)     Hypertension      Past Surgical History:   Procedure Laterality Date    CARPAL TUNNEL RELEASE      CHOLECYSTECTOMY      KNEE SURGERY      PACEMAKER INSERTION      PACEMAKER PLACEMENT      TONSILLECTOMY  TUBAL LIGATION       Family History   Problem Relation Age of Onset    Hypertension Sister      Social History     Tobacco Use    Smoking status: Current Every Day Smoker     Packs/day: 0.50     Types: Cigarettes    Smokeless tobacco: Never Used   Vaping Use    Vaping Use: Never used   Substance Use Topics    Alcohol use: No    Drug use: No       Allergies   Allergen Reactions    Pcn [Penicillins] Shortness Of Breath    Sulfa Antibiotics Shortness Of Breath    Asa [Aspirin] Swelling    Shellfish Allergy Swelling    Tramadol Nausea And Vomiting     Current Outpatient Medications   Medication Sig Dispense Refill    gabapentin (NEURONTIN) 600 MG tablet Take 600 mg by mouth 3 times daily.  furosemide (LASIX) 20 MG tablet Take 1 tablet by mouth as needed (as needed for weight gain) 90 tablet 3    rivaroxaban (XARELTO) 20 MG TABS tablet TAKE 1 TABLET BY MOUTH DAILY WITH SUPPER 90 tablet 3    atorvastatin (LIPITOR) 10 MG tablet Take 1 tablet by mouth nightly 90 tablet 3    sacubitril-valsartan (ENTRESTO)  MG per tablet TAKE 1 TABLET BY MOUTH TWICE DAILY 180 tablet 3    carvedilol (COREG) 25 MG tablet Take 0.5 tablets by mouth 2 times daily (with meals) Hold for SBP<100 180 tablet 1    Probiotic Product (PROBIOTIC DAILY PO) Take by mouth every other day      loratadine (CLARITIN) 10 MG tablet Take 10 mg by mouth daily as needed      acetaminophen (TYLENOL) 500 MG tablet Take 500 mg by mouth every 6 hours as needed for Pain Max 4,000mg total acetaminophen per 24 hours.       magnesium (MAGNESIUM-OXIDE) 250 MG TABS tablet Take 250 mg by mouth daily      Biotin 5000 MCG TABS Take 1 tablet by mouth daily      vitamin D (CHOLECALCIFEROL) 5000 units CAPS capsule Take 5,000 Units by mouth daily      Multiple Vitamins-Minerals (CENTRUM SILVER PO) Take 1 tablet by mouth daily      latanoprost (XALATAN) 0.005 % ophthalmic solution Place 1 drop into both eyes nightly      gabapentin (NEURONTIN) appreciated. No mass and no thyromegaly present. No lymphadenopathy present. Cardiovascular: Normal rate. Normal heart sounds. Exam reveals no gallop and no friction rub. No murmur heard. Pulmonary/Chest: Effort normal and breath sounds normal. No respiratory distress. No wheezes, rhonchi or rales. Abdominal: Soft, non-tender. Bowel sounds are normal. Exhibits no organomegaly, mass or bruit. Extremities: No edema. No cyanosis or clubbing. Pulses are 2+ radial and carotid bilaterally. Neurological: No gross cranial nerve deficit. Coordination normal.   Skin: Skin is warm and dry. There is no rash or diaphoresis. Psychiatric: Patient has a normal mood and affect. Speech is normal and behavior is normal.     Lab Review:   FLP:    Lab Results   Component Value Date    TRIG 81 11/27/2018    HDL 55 11/27/2018    LDLCALC 89 11/27/2018    LABVLDL 16 11/27/2018     BUN/Creatinine:    Lab Results   Component Value Date    BUN 20 09/11/2019    CREATININE 1.2 09/11/2019       EKG Interpretation: 11/26/18 Reviewed, Sinus rhythm with premature supraventricular complexes. Left bundle branch block  11/17/20 V-paced    Imaging:  Cath: 2004 ([facility])  The left main coronary artery was angiographically normal.   The left anterior descending artery is widely patent, tapers relatively quickly, but is without significant disease. The circumflex coronary artery is a dominant vessel and is angiographically normal.  The right coronary artery is small, technically nondominant, and is angiographically normal.  A left ventriculogram shows mild diffuse hypokinesis. LVEF is estimated at 45%. There is no mitral insufficiency. There is no gradient on pullback across the aortic valve.     Abd CT: 10/2015  Atherosclerotic calcifications are present within   the abdominal aorta and proximal iliac arteries.      PFT: 10/2018 ([facility])  The patient has mild obstructive lung disease with no bronchodilator response seen.   Mild restrictive defect. Clinical and radiographic correlation is needed, although this can be explained on the basis of obesity since ERV is reduced to 16%. Minimal reduction in DLCO    Echo: 11/28/18  Suboptimal image quality. Definity contrast administered. Left ventricular cavity size is mildly dilated. Normal left ventricular wall thickness. Overall left ventricular systolic function appears severely reduced. Ejection fraction is visually estimated to be 30%. There is moderate diffuse hypokinesis with severe hypokinesis of the septal wall and apex of the left ventricle. Normal right ventricular size and function. TAPSE measures 21mm. Moderate-to-severe tricuspid regurgitation. No evidence of tricuspid stenosis. IVC not well visualized. Estimated pulmonary artery systolic pressure is normal at 40 mmHg assuming a right atrial pressure of 3 mmHg    Echo: 5/16/19   LV is dilated. LV Ejection fraction is visually estimated to be 20-25%. Severe global hypokinesis with dysynchrony lateral and septal walls. Mild MR. The left atrium is dilated. Assessment and plan:   1) Chronic systolic heart failure. EF 25%. S/p Biv-ICD 9/9/19. Appears compensated/ NYHA II-III. Etiology likely non-ischemic with remote normal cath and EF low at that time. Device interrogation 2/18/21 showed normal functioning and Optivol in normal range. Continue Entresto  mg bid, Coreg 25mg BID, and Lasix PRN. Continue monitoring daily weights. Encouraged a low sodium diet. Discussed the addition of Corbett Massing but patient is concerned it will likely be cost prohibitive. Will readdress at follow up or with worsening symptoms. 2) PAD. Asymptomatic. Continue with medical management and risk factor modification including. Continue Lipitor. ASA listed in allergies. 3) Unprovoked PE. Diagnosed 8/2019. Sister also with unprovoked PEs. Continue Xarelto. 4) COPD/Nicotine Addiction. Encouraged smoking cessation.  May also have chronic NUGENT related to COPD.    5) NSVT. Continue B-blocker. Asymptomatic. Continue to monitor with routine device interrogations. 6) Essential hypertension. Controlled. Goal BP <130/80. Continue medical therapy. 7) Morbid obesity. BMI 45. Encouraged weight loss. 8) Chronic pain. Instructed to avoid the use of NSAIDs if possible. Acetaminophen is preferred agent but will defer pain management to PCP.      Follow up in 6 months. Thank you very much for allowing me to participate in the care of your patient. Please do not hesitate to contact me if you have any questions. Sincerely,  Deandra Carlos. Bonilla Ramos, [facility], [address] [place] [address]  Ph: [phone]  Fax: [phone]      This note was scribed in the presence of Dr Bonilla Ramos MD by Fadumo Bell RN. Physician Attestation: The scribes documentation has been prepared under my direction and personally reviewed by me in its entirety. I confirm that the note above accurately reflects all work, treatment, procedures, and medical decision making performed by me. All portions of the note including but not limited to the chief complaint, history of present illness, physical exam, assessment and plan/medical decision making were personally reviewed, edited, and updated on the day of the visit.

## 2021-05-18 NOTE — PATIENT INSTRUCTIONS
Patient Education        Heart Failure: Care Instructions  Your Care Instructions     Heart failure occurs when your heart does not pump as much blood as the body needs. Failure does not mean that the heart has stopped pumping but rather that it is not pumping as well as it should. Over time, this causes fluid buildup in your lungs and other parts of your body. Fluid buildup can cause shortness of breath, fatigue, swollen ankles, and other problems. By taking medicines regularly, reducing sodium (salt) in your diet, checking your weight every day, and making lifestyle changes, you can feel better and live longer. Follow-up care is a key part of your treatment and safety. Be sure to make and go to all appointments, and call your doctor if you are having problems. It's also a good idea to know your test results and keep a list of the medicines you take. How can you care for yourself at home? Medicines    · Be safe with medicines. Take your medicines exactly as prescribed. Call your doctor if you think you are having a problem with your medicine.     · Do not take any vitamins, over-the-counter medicine, or herbal products without talking to your doctor first. Daniel Vizcarra not take ibuprofen (Advil or Motrin) and naproxen (Aleve) without talking to your doctor first. They could make your heart failure worse.     · You may take some of the following medicine. ? Angiotensin-converting enzyme inhibitors (ACEIs) or angiotensin II receptor blockers (ARBs) reduce the heart's workload, lower blood pressure, and reduce swelling. Taking an ACEI or ARB may lower your chance of needing to be hospitalized. ? Beta-blockers can slow heart rate, decrease blood pressure, and improve your condition. Taking a beta-blocker may lower your chance of needing to be hospitalized. ? Diuretics, also called water pills, reduce swelling. You will get more details on the specific medicines your doctor prescribes.   Diet    · Your doctor may suggest that you limit sodium. Your doctor can tell you how much sodium is right for you. An example is less than 3,000 mg a day. This includes all the salt you eat in cooking or in packaged foods. People get most of their sodium from processed foods. Fast food and restaurant meals also tend to be very high in sodium.     · Ask your doctor how much liquid you can drink each day. You may have to limit liquids. Weight    · Weigh yourself without clothing at the same time each day. Record your weight. Call your doctor if you have a sudden weight gain, such as more than 2 to 3 pounds in a day or 5 pounds in a week. (Your doctor may suggest a different range of weight gain.) A sudden weight gain may mean that your heart failure is getting worse. Activity level    · Start light exercise (if your doctor says it is okay). Even if you can only do a small amount, exercise will help you get stronger, have more energy, and manage your weight and your stress. Walking is an easy way to get exercise. Start out by walking a little more than you did before. Bit by bit, increase the amount you walk.     · When you exercise, watch for signs that your heart is working too hard. You are pushing yourself too hard if you cannot talk while you are exercising. If you become short of breath or dizzy or have chest pain, stop, sit down, and rest.     · If you feel \"wiped out\" the day after you exercise, walk slower or for a shorter distance until you can work up to a better pace.     · Get enough rest at night. Sleeping with 1 or 2 pillows under your upper body and head may help you breathe easier. Lifestyle changes    · Do not smoke. Smoking can make a heart condition worse. If you need help quitting, talk to your doctor about stop-smoking programs and medicines. These can increase your chances of quitting for good.  Quitting smoking may be the most important step you can take to protect your heart.     · Limit alcohol to 2 drinks a day for men and 1 drink a day for women. Too much alcohol can cause health problems.     · Avoid getting sick from colds and the flu. Get a pneumococcal vaccine shot. If you have had one before, ask your doctor whether you need another dose. Get a flu shot each year. If you must be around people with colds or the flu, wash your hands often. When should you call for help? Call 911 if you have symptoms of sudden heart failure such as:    · You have severe trouble breathing.     · You cough up pink, foamy mucus.     · You have a new irregular or rapid heartbeat. Call your doctor now or seek immediate medical care if:    · You have new or increased shortness of breath.     · You are dizzy or lightheaded, or you feel like you may faint.     · You have sudden weight gain, such as more than 2 to 3 pounds in a day or 5 pounds in a week. (Your doctor may suggest a different range of weight gain.)     · You have increased swelling in your legs, ankles, or feet.     · You are suddenly so tired or weak that you cannot do your usual activities. Watch closely for changes in your health, and be sure to contact your doctor if you develop new symptoms. Where can you learn more? Go to https://[employer]. org and sign in to your [employer] account. Enter X592 in the [employer] box to learn more about \"Heart Failure: Care Instructions. \"     If you do not have an account, please click on the \"Sign Up Now\" link. Current as of: August 31, 2020               Content Version: 12.8  © 2006-2021 Healthwise, [employer]. Care instructions adapted under license by [facility] ([facility]). If you have questions about a medical condition or this instruction, always ask your healthcare professional. [place] [address] any warranty or liability for your use of this information.

## 2021-05-24 ENCOUNTER — NURSE ONLY (OUTPATIENT)
Dept: CARDIOLOGY CLINIC | Age: 71
End: 2021-05-24
Payer: MEDICARE

## 2021-05-24 ENCOUNTER — PATIENT MESSAGE (OUTPATIENT)
Dept: CARDIOLOGY CLINIC | Age: 71
End: 2021-05-24

## 2021-05-24 DIAGNOSIS — Z95.810 BIVENTRICULAR ICD (IMPLANTABLE CARDIOVERTER-DEFIBRILLATOR) IN PLACE: ICD-10-CM

## 2021-05-24 DIAGNOSIS — I42.8 NONISCHEMIC CARDIOMYOPATHY (HCC): ICD-10-CM

## 2021-05-24 DIAGNOSIS — I50.23 ACUTE ON CHRONIC SYSTOLIC HEART FAILURE, NYHA CLASS 3 (HCC): ICD-10-CM

## 2021-05-24 PROCEDURE — 93297 REM INTERROG DEV EVAL ICPMS: CPT | Performed by: INTERNAL MEDICINE

## 2021-05-24 PROCEDURE — 93296 REM INTERROG EVL PM/IDS: CPT | Performed by: INTERNAL MEDICINE

## 2021-05-24 PROCEDURE — 93295 DEV INTERROG REMOTE 1/2/MLT: CPT | Performed by: INTERNAL MEDICINE

## 2021-06-28 ENCOUNTER — NURSE ONLY (OUTPATIENT)
Dept: CARDIOLOGY CLINIC | Age: 71
End: 2021-06-28
Payer: MEDICARE

## 2021-06-28 DIAGNOSIS — I42.8 NONISCHEMIC CARDIOMYOPATHY (HCC): ICD-10-CM

## 2021-06-28 DIAGNOSIS — I47.29 NSVT (NONSUSTAINED VENTRICULAR TACHYCARDIA): ICD-10-CM

## 2021-06-28 DIAGNOSIS — I50.23 ACUTE ON CHRONIC SYSTOLIC HEART FAILURE, NYHA CLASS 3 (HCC): ICD-10-CM

## 2021-06-28 DIAGNOSIS — Z95.810 BIVENTRICULAR ICD (IMPLANTABLE CARDIOVERTER-DEFIBRILLATOR) IN PLACE: ICD-10-CM

## 2021-06-28 PROCEDURE — 93297 REM INTERROG DEV EVAL ICPMS: CPT | Performed by: INTERNAL MEDICINE

## 2021-06-28 PROCEDURE — G2066 INTER DEVC REMOTE 30D: HCPCS | Performed by: INTERNAL MEDICINE

## 2021-06-28 NOTE — PROGRESS NOTES
We received remote transmission from patient's CRT-D monitor at home. Transmission shows normal sensing and pacing function. No new arrhythmias. Ap 0.2%  BiVp 98.3%, Effective 98.3%, VSRp 1.6%    Optivol is within normal range. EP physician will review.  See interrogation under cardiology tab in the [address] [place] [address] [address] field for more details.

## 2021-08-23 ENCOUNTER — NURSE ONLY (OUTPATIENT)
Dept: CARDIOLOGY CLINIC | Age: 71
End: 2021-08-23
Payer: MEDICARE

## 2021-08-23 DIAGNOSIS — Z95.810 IMPLANTABLE CARDIOVERTER-DEFIBRILLATOR (ICD) IN SITU: ICD-10-CM

## 2021-08-23 DIAGNOSIS — I42.8 NONISCHEMIC CARDIOMYOPATHY (HCC): ICD-10-CM

## 2021-08-23 DIAGNOSIS — I50.22 CHRONIC LEFT SYSTOLIC HEART FAILURE (HCC): ICD-10-CM

## 2021-08-23 PROCEDURE — 93296 REM INTERROG EVL PM/IDS: CPT | Performed by: INTERNAL MEDICINE

## 2021-08-23 PROCEDURE — 93295 DEV INTERROG REMOTE 1/2/MLT: CPT | Performed by: INTERNAL MEDICINE

## 2021-08-23 PROCEDURE — 93297 REM INTERROG DEV EVAL ICPMS: CPT | Performed by: INTERNAL MEDICINE

## 2021-08-23 NOTE — PROGRESS NOTES
We received remote transmission from patient's CRT-D monitor at home. Transmission shows normal sensing and pacing function. Slight increasing trend noted in LV threshold w Adaptive output ON. No new arrhythmias/events recorded. Ap 0.6%  BiVp 98.0%, Effective 98.0%, VSRp 1.6%    Optivol has slight increased trend w the appearance of a plateau and TI trends have returned to reference line. EP physician will review. See interrogation under cardiology tab in the [address] [place] [address] [address] field for more details.

## 2021-09-09 ENCOUNTER — TELEPHONE (OUTPATIENT)
Dept: CARDIOLOGY CLINIC | Age: 71
End: 2021-09-09

## 2021-09-09 NOTE — TELEPHONE ENCOUNTER
Noelle Kelly called in wanting to check if it is okay to take stiolto respimat 2.5 mg inhaler. She is supposed to take it once or twice a day. Dr. Dan Breed her pulmonary doctor prescribed it for her and she wanted to make sure that it is okay to take with all her other medications.       Noelle Kelly can be reached at [phone]

## 2021-10-05 RX ORDER — SACUBITRIL AND VALSARTAN 97; 103 MG/1; MG/1
TABLET, FILM COATED ORAL
Qty: 180 TABLET | Refills: 0 | Status: SHIPPED | OUTPATIENT
Start: 2021-10-05 | End: 2022-01-17

## 2021-10-05 NOTE — TELEPHONE ENCOUNTER
Last ov 5/18/21  Pending appt 11/9/21  Last refill 9/18/20 #180x3  Last labs 4/28/21 CMP in care everywhere

## 2021-10-11 ENCOUNTER — APPOINTMENT (OUTPATIENT)
Dept: GENERAL RADIOLOGY | Age: 71
End: 2021-10-11
Payer: MEDICARE

## 2021-10-11 ENCOUNTER — NURSE ONLY (OUTPATIENT)
Dept: CARDIOLOGY CLINIC | Age: 71
End: 2021-10-11
Payer: MEDICARE

## 2021-10-11 ENCOUNTER — HOSPITAL ENCOUNTER (EMERGENCY)
Age: 71
Discharge: HOME | End: 2021-10-11
Attending: EMERGENCY MEDICINE
Payer: MEDICARE

## 2021-10-11 VITALS
WEIGHT: 274.69 LBS | HEIGHT: 66 IN | HEART RATE: 67 BPM | TEMPERATURE: 98.1 F | SYSTOLIC BLOOD PRESSURE: 101 MMHG | RESPIRATION RATE: 16 BRPM | BODY MASS INDEX: 44.15 KG/M2 | DIASTOLIC BLOOD PRESSURE: 64 MMHG | OXYGEN SATURATION: 95 %

## 2021-10-11 DIAGNOSIS — I50.22 CHRONIC LEFT SYSTOLIC HEART FAILURE (HCC): ICD-10-CM

## 2021-10-11 DIAGNOSIS — Z95.810 IMPLANTABLE CARDIOVERTER-DEFIBRILLATOR (ICD) IN SITU: ICD-10-CM

## 2021-10-11 DIAGNOSIS — I42.8 NONISCHEMIC CARDIOMYOPATHY (HCC): ICD-10-CM

## 2021-10-11 DIAGNOSIS — M25.512 ACUTE PAIN OF LEFT SHOULDER: Primary | ICD-10-CM

## 2021-10-11 PROCEDURE — G2066 INTER DEVC REMOTE 30D: HCPCS | Performed by: INTERNAL MEDICINE

## 2021-10-11 PROCEDURE — 99283 EMERGENCY DEPT VISIT LOW MDM: CPT

## 2021-10-11 PROCEDURE — 73030 X-RAY EXAM OF SHOULDER: CPT

## 2021-10-11 PROCEDURE — 93297 REM INTERROG DEV EVAL ICPMS: CPT | Performed by: INTERNAL MEDICINE

## 2021-10-11 RX ORDER — LIDOCAINE 50 MG/G
1 PATCH TOPICAL DAILY
Qty: 10 PATCH | Refills: 0 | Status: SHIPPED | OUTPATIENT
Start: 2021-10-11 | End: 2021-10-21

## 2021-10-11 RX ORDER — CYCLOBENZAPRINE HCL 10 MG
10 TABLET ORAL 3 TIMES DAILY PRN
Qty: 12 TABLET | Refills: 0 | Status: SHIPPED | OUTPATIENT
Start: 2021-10-11 | End: 2021-10-21

## 2021-10-11 RX ORDER — ACETAMINOPHEN 500 MG
1000 TABLET ORAL EVERY 6 HOURS PRN
Qty: 30 TABLET | Refills: 0 | Status: SHIPPED | OUTPATIENT
Start: 2021-10-11

## 2021-10-11 ASSESSMENT — PAIN SCALES - GENERAL: PAINLEVEL_OUTOF10: 0

## 2021-10-11 NOTE — ED PROVIDER NOTES
Triage Chief Complaint:   Shoulder Pain (Left shoulder pain x1 month. Hx of arthritis in shoulder. Reports striking shoulder with a door one month ago and reports gradually increasing pain since then. CMS verififed on arrival. Denies chest pin or shorntess of breath. Maria Elena worsens with movement.)      [place]:  Armando Malagon is a 66 y.o. female that presents to the emergency department with left shoulder pain. This has been going on for over a month. She does have a history of arthritis in the shoulder. She states that she hit this shoulder on a door about a month ago and the pain is gradually gotten worse. She has been taking some Tylenol for this. She cannot take NSAIDs. She has not seen her primary care physician for this. She denies chest pain, shortness of breath, numbness tingling weakness down her arm. .    Past Medical History:   Diagnosis Date    [facility]     [facility] disease[facility] [address]     Hypertension      Past Surgical History:   Procedure Laterality Date    CARPAL TUNNEL RELEASE      CHOLECYSTECTOMY      KNEE SURGERY      PACEMAKER INSERTION      PACEMAKER PLACEMENT      TONSILLECTOMY      TUBAL LIGATION       Family History   Problem Relation Age of Onset    Hypertension Sister      Social History     Socioeconomic History    Marital status:       Spouse name: Not on file    Number of children: Not on file    Years of education: Not on file    Highest education level: Not on file   Occupational History    Not on file   Tobacco Use    Smoking status: Current Every Day Smoker     Packs/day: 0.50     Types: Cigarettes    Smokeless tobacco: Never Used   Vaping Use    Vaping Use: Never used   Substance and Sexual Activity    Alcohol use: No    Drug use: No    Sexual activity: Not on file   Other Topics Concern    Not on file   Social History Narrative    Not on file     Social Determinants of Health     Financial Resource Strain:     Difficulty of Paying Living Expenses:    Food Insecurity:     Worried About Running Out of Food in the Last Year:     [address] in the Last Year:    Transportation Needs:     Lack of Transportation (Medical):  Lack of Transportation (Non-Medical):    Physical Activity:     Days of Exercise per Week:     Minutes of Exercise per Session:    Stress:     Feeling of Stress :    Social Connections:     Frequency of Communication with Friends and Family:     Frequency of Social Gatherings with Friends and Family:     Attends [employer] Services:     Active Member of Clubs or Organizations:     Attends Club or Organization Meetings:     Marital Status:    Intimate Partner Violence:     Fear of Current or Ex-Partner:     Emotionally Abused:     Physically Abused:     Sexually Abused:      No current facility-administered medications for this encounter. Current Outpatient Medications   Medication Sig Dispense Refill    cyclobenzaprine (FLEXERIL) 10 MG tablet Take 1 tablet by mouth 3 times daily as needed for Muscle spasms 12 tablet 0    lidocaine (LIDODERM) 5 % Place 1 patch onto the skin daily for 10 days 12 hours on, 12 hours off. 10 patch 0    acetaminophen (TYLENOL) 500 MG tablet Take 2 tablets by mouth every 6 hours as needed for Pain Max 4,000mg total acetaminophen per 24 hours. 30 tablet 0    ENTRESTO  MG per tablet TAKE 1 TABLET BY MOUTH TWICE DAILY 180 tablet 0    gabapentin (NEURONTIN) 600 MG tablet Take 600 mg by mouth 3 times daily.       atorvastatin (LIPITOR) 10 MG tablet Take 1 tablet by mouth nightly 90 tablet 3    carvedilol (COREG) 12.5 MG tablet Take 1 tablet by mouth 2 times daily (with meals) 180 tablet 2    furosemide (LASIX) 20 MG tablet Take 1 tablet by mouth as needed (as needed for weight gain) 90 tablet 3    rivaroxaban (XARELTO) 20 MG TABS tablet TAKE 1 TABLET BY MOUTH DAILY WITH SUPPER 90 tablet 3    Probiotic Product (PROBIOTIC DAILY PO) Take by mouth every other day      loratadine (CLARITIN) 10 MG tablet Take 10 mg by mouth daily as needed      magnesium (MAGNESIUM-OXIDE) 250 MG TABS tablet Take 250 mg by mouth daily      Biotin 5000 MCG TABS Take 1 tablet by mouth daily      vitamin D (CHOLECALCIFEROL) 5000 units CAPS capsule Take 5,000 Units by mouth daily      Multiple Vitamins-Minerals (CENTRUM SILVER PO) Take 1 tablet by mouth daily      latanoprost (XALATAN) 0.005 % ophthalmic solution Place 1 drop into both eyes nightly      gabapentin (NEURONTIN) 800 MG tablet Take 400 mg by mouth 3 times daily.  albuterol-ipratropium (COMBIVENT RESPIMAT)  MCG/ACT AERS inhaler Inhale 1 puff into the lungs every 6 hours as needed for Wheezing or Shortness of Breath      hydrocortisone 2.5 % cream Apply 1 each topically 3 times daily as needed (itching/rash)        Allergies   Allergen Reactions    Pcn [Penicillins] Shortness Of Breath    Sulfa Antibiotics Shortness Of Breath    Asa [Aspirin] Swelling    Shellfish Allergy Swelling    Tramadol Nausea And Vomiting     Nursing Notes Reviewed    ROS:  At least 10 systems reviewed and otherwise negative except as in the [employer]. Physical Exam:  ED Triage Vitals [10/11/21 1614]   Enc Vitals Group      /64      Pulse 67      Resp 16      Temp 98.1 °F (36.7 °C)      Temp Source Oral      SpO2 97 %      Weight 274 lb 11.1 oz (124.6 kg)      Height 5' 6\" (1.676 m)      Head Circumference       Peak Flow       Pain Score       Pain Loc       Pain Edu? Excl. in [address]? My pulse oximetry interpretation is which is within the normal range    GENERAL APPEARANCE: Awake and alert. Cooperative. No acute distress. HEAD:  Atraumatic. EYES: EOM's grossly intact. ENT: Mucous membranes are moist.  No trismus. NECK:  Trachea midline. EXTREMITIES: No acute deformities. Tender to palpation over the superior and posterior shoulder without bruising, crepitus or swelling. Good radial pulse. Decreased range of motion of left shoulder secondary to pain  SKIN: Warm and dry. NEUROLOGICAL: No gross facial drooping. Moves all 4 extremities spontaneously. PSYCHIATRIC: Normal mood. I have reviewed and interpreted all of the currently available lab results from this visit (if applicable):  No results found for this visit on 10/11/21. EKG: (All EKG's are interpreted by myself in the absence of a cardiologist)      MDM:  Patient states that she cannot take NSAIDs. X-ray shows no fracture and shows arthritis only. I will write for extra strength Tylenol, muscle relaxers and Lidoderm patches. Patient was instructed to follow-up with her primary care physician for ongoing pain control    Clinical Impression:  1. Acute pain of left shoulder        Disposition Vitals:  [unfilled], [unfilled], [unfilled], [unfilled]    Disposition referral (if applicable):  [facility] [id number]  [phone]    Schedule an appointment as soon as possible for a visit   If symptoms worsen      Disposition medications (if applicable):  New Prescriptions    CYCLOBENZAPRINE (FLEXERIL) 10 MG TABLET    Take 1 tablet by mouth 3 times daily as needed for Muscle spasms    LIDOCAINE (LIDODERM) 5 %    Place 1 patch onto the skin daily for 10 days 12 hours on, 12 hours off.          (Please note that portions of this note may have been completed with a voice recognition program. Efforts were made to edit the dictations but occasionally words are mis-transcribed.)    Mauricia Germany, MD Saintclair Benedict, MD  10/11/21 [phone]

## 2021-10-11 NOTE — ED TRIAGE NOTES
Patient presents to ED complaining of left shoulder pain x1 month. Hx of arthritis in shoulder. Reports striking shoulder with a door one month ago and reports gradually increasing pain since then. CMS verififed on arrival. Denies chest pain or shorntess of breath. Pain worsens with movement. Patient resting on bed, respirations even and easy at this time. No obvious distress.

## 2021-10-11 NOTE — PROGRESS NOTES
We received remote transmission from patient's CRT-D monitor at home. Transmission shows normal sensing and pacing function. Known elevated LV threshold w Adaptive output ON. No new arrhythmias/events recorded. Ap 0.2%  BiVp 98.1%, Effective 98.1%, VSRp 1.6%    Possible Optivol fluid accumulation 08.27-09.30.21; now resolved and back to baseline. EP physician will review. See interrogation under cardiology tab in the [address] [facility] [address] field for more details.

## 2021-10-11 NOTE — ED NOTES
Patient assisted from ED via wheelchair. AVS provided and discussed with patient. All questions answered. Patient verbalizes understanding of discharge instructions. Respirations even and easy. No obvious distress at this time. Patient notified that they should not drive while taking flexaril due to potential for drowsiness. Patient verbalizes understanding.        Tamra Emreson RN  10/11/21 [address], RN  10/11/21 1175

## 2021-10-20 ENCOUNTER — TELEPHONE (OUTPATIENT)
Dept: CARDIOLOGY CLINIC | Age: 71
End: 2021-10-20

## 2021-11-08 NOTE — PROGRESS NOTES
SURGERY      PACEMAKER INSERTION      PACEMAKER PLACEMENT      TONSILLECTOMY      TUBAL LIGATION       Family History   Problem Relation Age of Onset    Hypertension Sister      Social History     Tobacco Use    Smoking status: Current Every Day Smoker     Packs/day: 0.50     Types: Cigarettes    Smokeless tobacco: Never Used   Vaping Use    Vaping Use: Never used   Substance Use Topics    Alcohol use: No    Drug use: No       Allergies   Allergen Reactions    Pcn [Penicillins] Shortness Of Breath    Sulfa Antibiotics Shortness Of Breath    Asa [Aspirin] Swelling    Shellfish Allergy Swelling    Tramadol Nausea And Vomiting     Current Outpatient Medications   Medication Sig Dispense Refill    acetaminophen (TYLENOL) 500 MG tablet Take 2 tablets by mouth every 6 hours as needed for Pain Max 4,000mg total acetaminophen per 24 hours. 30 tablet 0    ENTRESTO  MG per tablet TAKE 1 TABLET BY MOUTH TWICE DAILY 180 tablet 0    gabapentin (NEURONTIN) 600 MG tablet Take 600 mg by mouth 3 times daily.       atorvastatin (LIPITOR) 10 MG tablet Take 1 tablet by mouth nightly 90 tablet 3    carvedilol (COREG) 12.5 MG tablet Take 1 tablet by mouth 2 times daily (with meals) 180 tablet 2    furosemide (LASIX) 20 MG tablet Take 1 tablet by mouth as needed (as needed for weight gain) 90 tablet 3    rivaroxaban (XARELTO) 20 MG TABS tablet TAKE 1 TABLET BY MOUTH DAILY WITH SUPPER 90 tablet 3    Probiotic Product (PROBIOTIC DAILY PO) Take by mouth every other day      loratadine (CLARITIN) 10 MG tablet Take 10 mg by mouth daily as needed      magnesium (MAGNESIUM-OXIDE) 250 MG TABS tablet Take 250 mg by mouth daily      vitamin D (CHOLECALCIFEROL) 5000 units CAPS capsule Take 5,000 Units by mouth daily      Multiple Vitamins-Minerals (CENTRUM SILVER PO) Take 1 tablet by mouth daily      latanoprost (XALATAN) 0.005 % ophthalmic solution Place 1 drop into both eyes nightly      albuterol-ipratropium (COMBIVENT RESPIMAT)  MCG/ACT AERS inhaler Inhale 1 puff into the lungs every 6 hours as needed for Wheezing or Shortness of Breath      hydrocortisone 2.5 % cream Apply 1 each topically 3 times daily as needed (itching/rash)       Biotin 5000 MCG TABS Take 1 tablet by mouth daily (Patient not taking: Reported on 11/9/2021)       No current facility-administered medications for this visit. Review of Systems:  · Constitutional: no unanticipated weight loss. There's been no change in energy level, sleep pattern, or activity level. No fevers, chills. · Eyes: No visual changes or diplopia. No scleral icterus. · ENT: No Headaches, hearing loss or vertigo. No mouth sores or sore throat. · Cardiovascular: as reviewed in HPI  · Respiratory: No cough or wheezing, no sputum production. No hematemesis. · Gastrointestinal: No abdominal pain, appetite loss, blood in stools. No change in bowel or bladder habits. · Genitourinary: No dysuria, trouble voiding, or hematuria. · Musculoskeletal:  No gait disturbance, no joint complaints. · Integumentary: No rash or pruritis. · Neurological: No headache, diplopia, change in muscle strength, numbness or tingling. · Psychiatric: No anxiety or depression. · Endocrine: No temperature intolerance. No excessive thirst, fluid intake, or urination. No tremor. · Hematologic/Lymphatic: No abnormal bruising or bleeding, blood clots or swollen lymph nodes. · Allergic/Immunologic: No nasal congestion or hives. Physical Exam:   /62   Pulse 79   Ht 5' 6\" (1.676 m)   Wt 281 lb (127.5 kg)   SpO2 96%   BMI 45.35 kg/m²   Wt Readings from Last 3 Encounters:   11/09/21 281 lb (127.5 kg)   10/11/21 274 lb 11.1 oz (124.6 kg)   05/18/21 279 lb 12.8 oz (126.9 kg)     Constitutional: The patient is an obese female who is oriented to person, place, and time. In no acute distress. Head: Normocephalic and atraumatic. Pupils equal and round. Neck: Neck supple.  No JVP or carotid bruit appreciated. No mass and no thyromegaly present. No lymphadenopathy present. Cardiovascular: Normal rate. Normal heart sounds. Exam reveals no gallop and no friction rub. No murmur heard. Pulmonary/Chest: Effort normal and breath sounds normal. No respiratory distress. No wheezes, rhonchi or rales. Abdominal: Soft, non-tender. Bowel sounds are normal. Exhibits no organomegaly, mass or bruit. Extremities: No edema. No cyanosis or clubbing. Pulses are 2+ radial and carotid bilaterally. Neurological: No gross cranial nerve deficit. Coordination normal.   Skin: Skin is warm and dry. There is no rash or diaphoresis. Psychiatric: Patient has a normal mood and affect. Speech is normal and behavior is normal.     Lab Review:   FLP:    Lab Results   Component Value Date    TRIG 81 11/27/2018    HDL 55 11/27/2018    LDLCALC 89 11/27/2018    LABVLDL 16 11/27/2018     BUN/Creatinine:    Lab Results   Component Value Date    BUN 20 09/11/2019    CREATININE 1.2 09/11/2019       EKG Interpretation: 11/26/18 Reviewed, Sinus rhythm with premature supraventricular complexes. Left bundle branch block  11/9/21 V-paced    Imaging:  Cath: 2004 ([facility])  The left main coronary artery was angiographically normal.   The left anterior descending artery is widely patent, tapers relatively quickly, but is without significant disease. The circumflex coronary artery is a dominant vessel and is angiographically normal.  The right coronary artery is small, technically nondominant, and is angiographically normal.  A left ventriculogram shows mild diffuse hypokinesis. LVEF is estimated at 45%. There is no mitral insufficiency. There is no gradient on pullback across the aortic valve.     Abd CT: 10/2015  Atherosclerotic calcifications are present within   the abdominal aorta and proximal iliac arteries.      PFT: 10/2018 ([address])  The patient has mild obstructive lung disease with no bronchodilator response seen.   Mild restrictive defect. Clinical and radiographic correlation is needed, although this can be explained on the basis of obesity since ERV is reduced to 16%. Minimal reduction in DLCO    Echo: 11/28/18  Suboptimal image quality. Definity contrast administered. Left ventricular cavity size is mildly dilated. Normal left ventricular wall thickness. Overall left ventricular systolic function appears severely reduced. Ejection fraction is visually estimated to be 30%. There is moderate diffuse hypokinesis with severe hypokinesis of the septal wall and apex of the left ventricle. Normal right ventricular size and function. TAPSE measures 21mm. Moderate-to-severe tricuspid regurgitation. No evidence of tricuspid stenosis. IVC not well visualized. Estimated pulmonary artery systolic pressure is normal at 40 mmHg assuming a right atrial pressure of 3 mmHg    Echo: 5/16/19   LV is dilated. LV Ejection fraction is visually estimated to be 20-25%. Severe global hypokinesis with dysynchrony lateral and septal walls. Mild MR. The left atrium is dilated. Assessment and plan:   1) Chronic systolic heart failure. EF 25%. S/p Biv-ICD 9/9/19. Appears compensated/ NYHA II-III. Etiology likely non-ischemic with remote normal cath and EF low at that time. Device interrogation 10/2021 showed normal functioning and Optivol in normal range. Continue Entresto  mg bid, Coreg 25mg BID, and Lasix PRN. Continue monitoring daily weights. Encouraged a low sodium diet. Will initiate [facility] and instructed to call if cost prohibitive. Not on aldactone with intermittent hypotension. 2) PAD. Asymptomatic. Continue with medical management and risk factor modification including. Continue Lipitor. ASA listed in allergies. 3) Unprovoked PE. Diagnosed 8/2019. Sister also with unprovoked PEs. Continue Xarelto. 4) COPD/Nicotine Addiction. Encouraged smoking cessation. May also have chronic NUGENT related to COPD. 5) NSVT.  Continue B-blocker. Asymptomatic. Continue to monitor with routine device interrogations. 6) Essential hypertension. Controlled. Goal BP <130/80. Continue medical therapy. 7) Morbid obesity. BMI 45. Encouraged weight loss. 8) Chronic pain/arthritis. Instructed to avoid the use of NSAIDs if possible. Acetaminophen is preferred agent but will defer pain management to PCP and orthopedics.      Follow up in 6 months. Thank you very much for allowing me to participate in the care of your patient. Please do not hesitate to contact me if you have any questions. Sincerely,  Saravanan Can. Amanda Brice, [address] [facility], [address] [place] [address]  Ph: [phone]  Fax: [phone]    This note was scribed in the presence of Dr Amanda Brice MD by Emily Chandra RN. Physician Attestation: The scribes documentation has been prepared under my direction and personally reviewed by me in its entirety. I confirm that the note above accurately reflects all work, treatment, procedures, and medical decision making performed by me. All portions of the note including but not limited to the chief complaint, history of present illness, physical exam, assessment and plan/medical decision making were personally reviewed, edited, and updated on the day of the visit.

## 2021-11-08 NOTE — PATIENT INSTRUCTIONS
medical condition or this instruction, always ask your healthcare professional. [place] [address] any warranty or liability for your use of this information.          Dorethia Lombard or Tammy

## 2021-11-09 ENCOUNTER — NURSE ONLY (OUTPATIENT)
Dept: CARDIOLOGY CLINIC | Age: 71
End: 2021-11-09
Payer: MEDICARE

## 2021-11-09 ENCOUNTER — OFFICE VISIT (OUTPATIENT)
Dept: CARDIOLOGY CLINIC | Age: 71
End: 2021-11-09
Payer: MEDICARE

## 2021-11-09 VITALS
BODY MASS INDEX: 45.16 KG/M2 | HEART RATE: 79 BPM | WEIGHT: 281 LBS | HEIGHT: 66 IN | SYSTOLIC BLOOD PRESSURE: 100 MMHG | OXYGEN SATURATION: 96 % | DIASTOLIC BLOOD PRESSURE: 62 MMHG

## 2021-11-09 DIAGNOSIS — I50.22 CHRONIC LEFT SYSTOLIC HEART FAILURE (HCC): ICD-10-CM

## 2021-11-09 DIAGNOSIS — I42.8 NONISCHEMIC CARDIOMYOPATHY (HCC): ICD-10-CM

## 2021-11-09 DIAGNOSIS — E66.01 MORBID OBESITY WITH BMI OF 45.0-49.9, ADULT (HCC): ICD-10-CM

## 2021-11-09 DIAGNOSIS — I50.23 ACUTE ON CHRONIC SYSTOLIC HEART FAILURE, NYHA CLASS 3 (HCC): ICD-10-CM

## 2021-11-09 DIAGNOSIS — Z95.810 BIVENTRICULAR ICD (IMPLANTABLE CARDIOVERTER-DEFIBRILLATOR) IN PLACE: ICD-10-CM

## 2021-11-09 DIAGNOSIS — Z86.711 HISTORY OF PULMONARY EMBOLUS (PE): ICD-10-CM

## 2021-11-09 DIAGNOSIS — I10 ESSENTIAL HYPERTENSION: ICD-10-CM

## 2021-11-09 DIAGNOSIS — I47.29 NSVT (NONSUSTAINED VENTRICULAR TACHYCARDIA): ICD-10-CM

## 2021-11-09 DIAGNOSIS — I73.9 PAD (PERIPHERAL ARTERY DISEASE) (HCC): ICD-10-CM

## 2021-11-09 DIAGNOSIS — I50.22 CHRONIC LEFT SYSTOLIC HEART FAILURE (HCC): Primary | ICD-10-CM

## 2021-11-09 DIAGNOSIS — Z95.810 IMPLANTABLE CARDIOVERTER-DEFIBRILLATOR (ICD) IN SITU: ICD-10-CM

## 2021-11-09 PROCEDURE — G8484 FLU IMMUNIZE NO ADMIN: HCPCS | Performed by: INTERNAL MEDICINE

## 2021-11-09 PROCEDURE — G8427 DOCREV CUR MEDS BY ELIG CLIN: HCPCS | Performed by: INTERNAL MEDICINE

## 2021-11-09 PROCEDURE — 99214 OFFICE O/P EST MOD 30 MIN: CPT | Performed by: INTERNAL MEDICINE

## 2021-11-09 PROCEDURE — 1090F PRES/ABSN URINE INCON ASSESS: CPT | Performed by: INTERNAL MEDICINE

## 2021-11-09 PROCEDURE — G8417 CALC BMI ABV UP PARAM F/U: HCPCS | Performed by: INTERNAL MEDICINE

## 2021-11-09 PROCEDURE — 93284 PRGRMG EVAL IMPLANTABLE DFB: CPT | Performed by: INTERNAL MEDICINE

## 2021-11-09 PROCEDURE — 1123F ACP DISCUSS/DSCN MKR DOCD: CPT | Performed by: INTERNAL MEDICINE

## 2021-11-09 PROCEDURE — 4004F PT TOBACCO SCREEN RCVD TLK: CPT | Performed by: INTERNAL MEDICINE

## 2021-11-09 PROCEDURE — 3017F COLORECTAL CA SCREEN DOC REV: CPT | Performed by: INTERNAL MEDICINE

## 2021-11-09 PROCEDURE — 4040F PNEUMOC VAC/ADMIN/RCVD: CPT | Performed by: INTERNAL MEDICINE

## 2021-11-09 PROCEDURE — G8400 PT W/DXA NO RESULTS DOC: HCPCS | Performed by: INTERNAL MEDICINE

## 2021-11-15 ENCOUNTER — CLINICAL DOCUMENTATION (OUTPATIENT)
Dept: OTHER | Age: 71
End: 2021-11-15

## 2021-11-22 ENCOUNTER — NURSE ONLY (OUTPATIENT)
Dept: CARDIOLOGY CLINIC | Age: 71
End: 2021-11-22
Payer: MEDICARE

## 2021-11-22 DIAGNOSIS — I42.9 CARDIOMYOPATHY, UNSPECIFIED TYPE (HCC): ICD-10-CM

## 2021-11-22 DIAGNOSIS — Z95.810 IMPLANTABLE CARDIOVERTER-DEFIBRILLATOR (ICD) IN SITU: ICD-10-CM

## 2021-11-22 DIAGNOSIS — I50.23 ACUTE ON CHRONIC SYSTOLIC HEART FAILURE, NYHA CLASS 3 (HCC): ICD-10-CM

## 2021-11-22 NOTE — PROGRESS NOTES
We received remote transmission from patient's CRT-D monitor at home. Transmission shows normal sensing and pacing function. Known elevated LV threshold w Adaptive output ON; slight increasing LV impedance trend noted. No new arrhythmias/events recorded. Ap 0.4%  BiVp 98.1%, Effective 98.1%, VSRp 1.6%    Optivol is within normal range. EP physician will review. See interrogation under cardiology tab in the [address] [place] [address] [address] field for more details. Will continue to monitor remotely.

## 2021-11-23 PROCEDURE — 93296 REM INTERROG EVL PM/IDS: CPT | Performed by: INTERNAL MEDICINE

## 2021-11-23 PROCEDURE — 93297 REM INTERROG DEV EVAL ICPMS: CPT | Performed by: INTERNAL MEDICINE

## 2021-11-23 PROCEDURE — 93295 DEV INTERROG REMOTE 1/2/MLT: CPT | Performed by: INTERNAL MEDICINE

## 2021-12-22 DIAGNOSIS — I10 ESSENTIAL HYPERTENSION: ICD-10-CM

## 2021-12-23 RX ORDER — CARVEDILOL 12.5 MG/1
TABLET ORAL
Qty: 180 TABLET | Refills: 3 | Status: SHIPPED | OUTPATIENT
Start: 2021-12-23

## 2021-12-23 NOTE — TELEPHONE ENCOUNTER
Last ov 11/9/21  Pending appt due in may  Last refill 5/18/21 #180x2  Last labs 10/13/21 cmp in care everywhere

## 2021-12-27 ENCOUNTER — NURSE ONLY (OUTPATIENT)
Dept: CARDIOLOGY CLINIC | Age: 71
End: 2021-12-27
Payer: MEDICARE

## 2021-12-27 DIAGNOSIS — I50.22 CHRONIC LEFT SYSTOLIC HEART FAILURE (HCC): ICD-10-CM

## 2021-12-27 DIAGNOSIS — I42.8 NONISCHEMIC CARDIOMYOPATHY (HCC): ICD-10-CM

## 2021-12-27 DIAGNOSIS — Z95.810 IMPLANTABLE CARDIOVERTER-DEFIBRILLATOR (ICD) IN SITU: ICD-10-CM

## 2021-12-27 PROCEDURE — G2066 INTER DEVC REMOTE 30D: HCPCS | Performed by: INTERNAL MEDICINE

## 2021-12-27 PROCEDURE — 93297 REM INTERROG DEV EVAL ICPMS: CPT | Performed by: INTERNAL MEDICINE

## 2021-12-28 NOTE — PROGRESS NOTES
We received remote transmission from patient's CRT-D monitor at home. Transmission shows normal sensing and pacing function. Known elevated LV threshold w Adaptive output ON; slight increasing LV impedance trend noted. No new arrhythmias/events recorded. Ap 0.1%  BiVp 98.3%, Effective 98.2%, VSRp 1.5%    Possible Optivol fluid accumulation 12.15-12.16.21, now resolved and back near baseline. EP physician will review. See interrogation under cardiology tab in the [address] [place] [address] [address] field for more details. Will continue to monitor remotely.

## 2022-01-17 RX ORDER — SACUBITRIL AND VALSARTAN 97; 103 MG/1; MG/1
TABLET, FILM COATED ORAL
Qty: 180 TABLET | Refills: 0 | Status: SHIPPED | OUTPATIENT
Start: 2022-01-17

## 2022-01-17 NOTE — TELEPHONE ENCOUNTER
Last ov 11/9/21  Pending appt due in may  Last refill 10/5/21 #180 NR  Last labs 10/13/21 cmp in care everywhere    [phone] (home)   Left message on pts machine to call back  Pt is due for appt in May

## 2022-01-19 ENCOUNTER — TELEPHONE (OUTPATIENT)
Dept: CARDIOLOGY CLINIC | Age: 72
End: 2022-01-19

## 2022-01-19 NOTE — TELEPHONE ENCOUNTER
Magda Royal stop in to drop her Patient Assistance paperwork for  to fill out. I place them in the PT. Assist. Folder.

## 2022-01-20 NOTE — TELEPHONE ENCOUNTER
Completed provider part of [facility] [employer] application form for Entresto 97/103. Signed by Dr. Luke Castellanos. Faxed to [phone]. Fax confirmation rec'd. Scanned into Epic.

## 2022-02-22 ENCOUNTER — NURSE ONLY (OUTPATIENT)
Dept: CARDIOLOGY CLINIC | Age: 72
End: 2022-02-22
Payer: MEDICARE

## 2022-02-22 DIAGNOSIS — Z95.810 IMPLANTABLE CARDIOVERTER-DEFIBRILLATOR (ICD) IN SITU: ICD-10-CM

## 2022-02-22 DIAGNOSIS — I42.8 NONISCHEMIC CARDIOMYOPATHY (HCC): ICD-10-CM

## 2022-02-22 DIAGNOSIS — I50.23 ACUTE ON CHRONIC SYSTOLIC HEART FAILURE, NYHA CLASS 3 (HCC): ICD-10-CM

## 2022-02-22 DIAGNOSIS — I50.22 CHRONIC LEFT SYSTOLIC HEART FAILURE (HCC): ICD-10-CM

## 2022-02-22 NOTE — PROGRESS NOTES
We received remote transmission from patient's CRT-D monitor at home. Transmission shows normal sensing and pacing function. Known elevated LV threshold w Adaptive output ON. No new arrhythmias/events recorded. Ap <0.1%  BiVp 98.3%, Effective 98.3%, VSRp 1.5%    Optivol is within normal range. EP physician will review. See interrogation under cardiology tab in the [address] [place] [address] [address] field for more details. Will continue to monitor remotely.

## 2022-02-26 PROCEDURE — 93297 REM INTERROG DEV EVAL ICPMS: CPT | Performed by: INTERNAL MEDICINE

## 2022-02-26 PROCEDURE — 93296 REM INTERROG EVL PM/IDS: CPT | Performed by: INTERNAL MEDICINE

## 2022-02-26 PROCEDURE — 93295 DEV INTERROG REMOTE 1/2/MLT: CPT | Performed by: INTERNAL MEDICINE

## 2022-03-14 NOTE — PROGRESS NOTES
CHOLECYSTECTOMY      KNEE SURGERY      PACEMAKER INSERTION      PACEMAKER PLACEMENT      TONSILLECTOMY      TUBAL LIGATION       Family History   Problem Relation Age of Onset    Hypertension Sister      Social History     Tobacco Use    Smoking status: Current Every Day Smoker     Packs/day: 0.50     Types: Cigarettes    Smokeless tobacco: Never Used   Vaping Use    Vaping Use: Never used   Substance Use Topics    Alcohol use: No    Drug use: No     Allergies   Allergen Reactions    Pcn [Penicillins] Shortness Of Breath    Sulfa Antibiotics Shortness Of Breath    Asa [Aspirin] Swelling    Shellfish Allergy Swelling    Tramadol Nausea And Vomiting     Current Outpatient Medications   Medication Sig Dispense Refill    ENTRESTO  MG per tablet TAKE 1 TABLET BY MOUTH TWICE DAILY 180 tablet 0    carvedilol (COREG) 12.5 MG tablet TAKE 1 TABLET BY MOUTH  TWICE DAILY WITH MEALS 180 tablet 3    rivaroxaban (XARELTO) 20 MG TABS tablet TAKE 1 TABLET BY MOUTH DAILY WITH SUPPER 90 tablet 3    dapagliflozin (FARXIGA) 10 MG tablet Take 1 tablet by mouth every morning 90 tablet 3    acetaminophen (TYLENOL) 500 MG tablet Take 2 tablets by mouth every 6 hours as needed for Pain Max 4,000mg total acetaminophen per 24 hours. 30 tablet 0    gabapentin (NEURONTIN) 600 MG tablet Take 600 mg by mouth 3 times daily.       atorvastatin (LIPITOR) 10 MG tablet Take 1 tablet by mouth nightly 90 tablet 3    furosemide (LASIX) 20 MG tablet Take 1 tablet by mouth as needed (as needed for weight gain) 90 tablet 3    Probiotic Product (PROBIOTIC DAILY PO) Take by mouth every other day      loratadine (CLARITIN) 10 MG tablet Take 10 mg by mouth daily as needed      magnesium (MAGNESIUM-OXIDE) 250 MG TABS tablet Take 250 mg by mouth daily      vitamin D (CHOLECALCIFEROL) 5000 units CAPS capsule Take 5,000 Units by mouth daily      Multiple Vitamins-Minerals (CENTRUM SILVER PO) Take 1 tablet by mouth daily      latanoprost (XALATAN) 0.005 % ophthalmic solution Place 1 drop into both eyes nightly      albuterol-ipratropium (COMBIVENT RESPIMAT)  MCG/ACT AERS inhaler Inhale 1 puff into the lungs every 6 hours as needed for Wheezing or Shortness of Breath      hydrocortisone 2.5 % cream Apply 1 each topically 3 times daily as needed (itching/rash)        No current facility-administered medications for this visit. Review of Systems:  · Constitutional: no unanticipated weight loss. There's been no change in energy level, sleep pattern, or activity level. No fevers, chills. · Eyes: No visual changes or diplopia. No scleral icterus. · ENT: No Headaches, hearing loss or vertigo. No mouth sores or sore throat. · Cardiovascular: as reviewed in HPI  · Respiratory: No cough or wheezing, no sputum production. No hematemesis. · Gastrointestinal: No abdominal pain, appetite loss, blood in stools. No change in bowel or bladder habits. · Genitourinary: No dysuria, trouble voiding, or hematuria. · Musculoskeletal:  No gait disturbance, no joint complaints. · Integumentary: No rash or pruritis. · Neurological: No headache, diplopia, change in muscle strength, numbness or tingling. · Psychiatric: No anxiety or depression. · Endocrine: No temperature intolerance. No excessive thirst, fluid intake, or urination. No tremor. · Hematologic/Lymphatic: No abnormal bruising or bleeding, blood clots or swollen lymph nodes. · Allergic/Immunologic: No nasal congestion or hives. Physical Exam:   /68   Pulse 80   Ht 5' 6\" (1.676 m)   Wt 286 lb (129.7 kg)   SpO2 99%   BMI 46.16 kg/m²   Wt Readings from Last 3 Encounters:   03/15/22 286 lb (129.7 kg)   11/09/21 281 lb (127.5 kg)   10/11/21 274 lb 11.1 oz (124.6 kg)     Constitutional: The patient is an obese female who is oriented to person, place, and time. In no acute distress. Head: Normocephalic and atraumatic. Pupils equal and round. Neck: Neck supple.  No JVP or carotid bruit appreciated. No mass and no thyromegaly present. No lymphadenopathy present. Cardiovascular: Normal rate. Normal heart sounds. Exam reveals no gallop and no friction rub. No murmur heard. Pulmonary/Chest: Effort normal and breath sounds normal. No respiratory distress. No wheezes, rhonchi or rales. Abdominal: Soft, non-tender. Bowel sounds are normal. Exhibits no organomegaly, mass or bruit. Extremities: No edema. No cyanosis or clubbing. Pulses are 2+ radial and carotid bilaterally. Neurological: No gross cranial nerve deficit. Coordination normal.   Skin: Skin is warm and dry. There is no rash or diaphoresis. Psychiatric: Patient has a normal mood and affect. Speech is normal and behavior is normal.     Lab Review:   FLP:    Lab Results   Component Value Date    TRIG 81 11/27/2018    HDL 55 11/27/2018    LDLCALC 89 11/27/2018    LABVLDL 16 11/27/2018     BUN/Creatinine:    Lab Results   Component Value Date    BUN 20 09/11/2019    CREATININE 1.2 09/11/2019     EKG Interpretation: 11/26/18 Reviewed, Sinus rhythm with premature supraventricular complexes. Left bundle branch block  11/9/21 V-paced    Imaging:  Cath: 2004 ([place])  The left main coronary artery was angiographically normal.   The left anterior descending artery is widely patent, tapers relatively quickly, but is without significant disease. The circumflex coronary artery is a dominant vessel and is angiographically normal.  The right coronary artery is small, technically nondominant, and is angiographically normal.  A left ventriculogram shows mild diffuse hypokinesis. LVEF is estimated at 45%. There is no mitral insufficiency.   There is no gradient on pullback across the aortic valve.     Abd CT: 10/2015  Atherosclerotic calcifications are present within   the abdominal aorta and proximal iliac arteries.      PFT: 10/2018 ([facility])  The patient has mild obstructive lung disease with no bronchodilator response seen.  Mild restrictive defect. Clinical and radiographic correlation is needed, although this can be explained on the basis of obesity since ERV is reduced to 16%. Minimal reduction in DLCO    Echo: 11/28/18  Suboptimal image quality. Definity contrast administered. Left ventricular cavity size is mildly dilated. Normal left ventricular wall thickness. Overall left ventricular systolic function appears severely reduced. Ejection fraction is visually estimated to be 30%. There is moderate diffuse hypokinesis with severe hypokinesis of the septal wall and apex of the left ventricle. Normal right ventricular size and function. TAPSE measures 21mm. Moderate-to-severe tricuspid regurgitation. No evidence of tricuspid stenosis. IVC not well visualized. Estimated pulmonary artery systolic pressure is normal at 40 mmHg assuming a right atrial pressure of 3 mmHg    Echo: 5/16/19   LV is dilated. LV Ejection fraction is visually estimated to be 20-25%. Severe global hypokinesis with dysynchrony lateral and septal walls. Mild MR. The left atrium is dilated. Assessment and plan:   1) Chronic systolic heart failure. EF 25%. S/p Biv-ICD 9/9/19. Appears compensated/ NYHA II-III. Etiology likely non-ischemic with remote normal cath and EF low at that time. Device interrogation 2/2022 showed normal functioning and Optivol in normal range. Continue Entresto  mg bid, Coreg 25mg BID, Farxiga, and Lasix PRN. Continue monitoring daily weights. Encouraged a low sodium diet. Not on aldactone with intermittent hypotension. 2) PAD. Asymptomatic. Continue with medical management and risk factor modification including. Continue Lipitor. ASA listed in allergies. 3) Unprovoked PE. Diagnosed 8/2019. Sister also with unprovoked PEs. Continue Xarelto. 4) COPD/Nicotine Addiction. Encouraged smoking cessation. May also have chronic NUGENT related to COPD. 5) NSVT. Continue B-blocker. Asymptomatic.  Continue to monitor

## 2022-03-15 ENCOUNTER — OFFICE VISIT (OUTPATIENT)
Dept: CARDIOLOGY CLINIC | Age: 72
End: 2022-03-15
Payer: MEDICARE

## 2022-03-15 VITALS
BODY MASS INDEX: 45.96 KG/M2 | DIASTOLIC BLOOD PRESSURE: 68 MMHG | HEIGHT: 66 IN | HEART RATE: 80 BPM | SYSTOLIC BLOOD PRESSURE: 104 MMHG | OXYGEN SATURATION: 99 % | WEIGHT: 286 LBS

## 2022-03-15 DIAGNOSIS — F17.218 CIGARETTE NICOTINE DEPENDENCE WITH OTHER NICOTINE-INDUCED DISORDER: ICD-10-CM

## 2022-03-15 DIAGNOSIS — Z95.810 BIVENTRICULAR ICD (IMPLANTABLE CARDIOVERTER-DEFIBRILLATOR) IN PLACE: ICD-10-CM

## 2022-03-15 DIAGNOSIS — I47.29 NSVT (NONSUSTAINED VENTRICULAR TACHYCARDIA): ICD-10-CM

## 2022-03-15 DIAGNOSIS — I10 ESSENTIAL HYPERTENSION: ICD-10-CM

## 2022-03-15 DIAGNOSIS — I50.22 CHRONIC LEFT SYSTOLIC HEART FAILURE (HCC): Primary | ICD-10-CM

## 2022-03-15 DIAGNOSIS — I73.9 PAD (PERIPHERAL ARTERY DISEASE) (HCC): ICD-10-CM

## 2022-03-15 DIAGNOSIS — E66.01 MORBID OBESITY WITH BMI OF 45.0-49.9, ADULT (HCC): ICD-10-CM

## 2022-03-15 DIAGNOSIS — Z86.711 HISTORY OF PULMONARY EMBOLUS (PE): ICD-10-CM

## 2022-03-15 PROCEDURE — G8417 CALC BMI ABV UP PARAM F/U: HCPCS | Performed by: INTERNAL MEDICINE

## 2022-03-15 PROCEDURE — 99214 OFFICE O/P EST MOD 30 MIN: CPT | Performed by: INTERNAL MEDICINE

## 2022-03-15 PROCEDURE — 4040F PNEUMOC VAC/ADMIN/RCVD: CPT | Performed by: INTERNAL MEDICINE

## 2022-03-15 PROCEDURE — 1123F ACP DISCUSS/DSCN MKR DOCD: CPT | Performed by: INTERNAL MEDICINE

## 2022-03-15 PROCEDURE — G8400 PT W/DXA NO RESULTS DOC: HCPCS | Performed by: INTERNAL MEDICINE

## 2022-03-15 PROCEDURE — 4004F PT TOBACCO SCREEN RCVD TLK: CPT | Performed by: INTERNAL MEDICINE

## 2022-03-15 PROCEDURE — G8484 FLU IMMUNIZE NO ADMIN: HCPCS | Performed by: INTERNAL MEDICINE

## 2022-03-15 PROCEDURE — 3017F COLORECTAL CA SCREEN DOC REV: CPT | Performed by: INTERNAL MEDICINE

## 2022-03-15 PROCEDURE — G8427 DOCREV CUR MEDS BY ELIG CLIN: HCPCS | Performed by: INTERNAL MEDICINE

## 2022-03-15 PROCEDURE — 1090F PRES/ABSN URINE INCON ASSESS: CPT | Performed by: INTERNAL MEDICINE

## 2022-03-15 NOTE — PATIENT INSTRUCTIONS
Patient Education        DASH Diet: Care Instructions  Your Care Instructions     The DASH diet is an eating plan that can help lower your blood pressure. DASH stands for Dietary Approaches to Stop Hypertension. Hypertension is high blood pressure. The DASH diet focuses on eating foods that are high in calcium, potassium, and magnesium. These nutrients can lower blood pressure. The foods that are highest in these nutrients are fruits, vegetables, low-fat dairy products, nuts, seeds, and legumes. But taking calcium, potassium, and magnesium supplements instead of eating foods that are high in those nutrients does not have the same effect. The DASH diet also includes whole grains, fish, and poultry. The DASH diet is one of several lifestyle changes your doctor may recommend to lower your high blood pressure. Your doctor may also want you to decrease the amount of sodium in your diet. Lowering sodium while following the DASH diet can lower blood pressure even further than just the DASH diet alone. Follow-up care is a key part of your treatment and safety. Be sure to make and go to all appointments, and call your doctor if you are having problems. It's also a good idea to know your test results and keep a list of the medicines you take. How can you care for yourself at home? Following the DASH diet  · Eat 4 to 5 servings of fruit each day. A serving is 1 medium-sized piece of fruit, ½ cup chopped or canned fruit, 1/4 cup dried fruit, or 4 ounces (½ cup) of fruit juice. Choose fruit more often than fruit juice. · Eat 4 to 5 servings of vegetables each day. A serving is 1 cup of lettuce or raw leafy vegetables, ½ cup of chopped or cooked vegetables, or 4 ounces (½ cup) of vegetable juice. Choose vegetables more often than vegetable juice. · Get 2 to 3 servings of low-fat and fat-free dairy each day. A serving is 8 ounces of milk, 1 cup of yogurt, or 1 ½ ounces of cheese. · Eat 6 to 8 servings of grains each day. A serving is 1 slice of bread, 1 ounce of dry cereal, or ½ cup of cooked rice, pasta, or cooked cereal. Try to choose whole-grain products as much as possible. · Limit lean meat, poultry, and fish to 2 servings each day. A serving is 3 ounces, about the size of a deck of cards. · Eat 4 to 5 servings of nuts, seeds, and legumes (cooked dried beans, lentils, and split peas) each week. A serving is 1/3 cup of nuts, 2 tablespoons of seeds, or ½ cup of cooked beans or peas. · Limit fats and oils to 2 to 3 servings each day. A serving is 1 teaspoon of vegetable oil or 2 tablespoons of salad dressing. · Limit sweets and added sugars to 5 servings or less a week. A serving is 1 tablespoon jelly or jam, ½ cup sorbet, or 1 cup of lemonade. · Eat less than 2,300 milligrams (mg) of sodium a day. If you limit your sodium to 1,500 mg a day, you can lower your blood pressure even more. · Be aware that all of these are the suggested number of servings for people who eat 1,800 to 2,000 calories a day. Your recommended number of servings may be different if you need more or fewer calories. Tips for success  · Start small. Do not try to make dramatic changes to your diet all at once. You might feel that you are missing out on your favorite foods and then be more likely to not follow the plan. Make small changes, and stick with them. Once those changes become habit, add a few more changes. · Try some of the following:  ? Make it a goal to eat a fruit or vegetable at every meal and at snacks. This will make it easy to get the recommended amount of fruits and vegetables each day. ? Try yogurt topped with fruit and nuts for a snack or healthy dessert. ? Add lettuce, tomato, cucumber, and onion to sandwiches. ? Combine a ready-made pizza crust with low-fat mozzarella cheese and lots of vegetable toppings. Try using tomatoes, squash, spinach, broccoli, carrots, cauliflower, and onions. ?  Have a variety of cut-up vegetables with a low-fat dip as an appetizer instead of chips and dip. ? Sprinkle sunflower seeds or chopped almonds over salads. Or try adding chopped walnuts or almonds to cooked vegetables. ? Try some vegetarian meals using beans and peas. Add garbanzo or kidney beans to salads. Make burritos and tacos with mashed smith beans or black beans. Where can you learn more? Go to https://TargetingMantra.Snoox. org and sign in to your [employer] account. Enter X678 in the [employer] box to learn more about \"DASH Diet: Care Instructions. \"     If you do not have an account, please click on the \"Sign Up Now\" link. Current as of: April 29, 2021               Content Version: 13.1  © 7635-7704 [employer]wise, Incorporated. Care instructions adapted under license by [facility] ([facility]). If you have questions about a medical condition or this instruction, always ask your healthcare professional. [address] any warranty or liability for your use of this information.

## 2022-03-27 DIAGNOSIS — I73.9 PAD (PERIPHERAL ARTERY DISEASE) (HCC): ICD-10-CM

## 2022-03-27 DIAGNOSIS — I10 ESSENTIAL HYPERTENSION: ICD-10-CM

## 2022-03-28 RX ORDER — ATORVASTATIN CALCIUM 10 MG/1
TABLET, FILM COATED ORAL
Qty: 90 TABLET | Refills: 1 | Status: SHIPPED | OUTPATIENT
Start: 2022-03-28 | End: 2022-09-19

## 2022-03-28 RX ORDER — FUROSEMIDE 20 MG/1
TABLET ORAL
Qty: 90 TABLET | Refills: 3 | Status: SHIPPED | OUTPATIENT
Start: 2022-03-28

## 2022-03-28 NOTE — TELEPHONE ENCOUNTER
Last OV: 11/9/21  Next OV: 9/12/22  Last refill:5/18/21  Most recent Labs: 9/11/19  Last EKG (if needed):11/9/21

## 2022-04-05 ENCOUNTER — NURSE ONLY (OUTPATIENT)
Dept: CARDIOLOGY CLINIC | Age: 72
End: 2022-04-05
Payer: MEDICARE

## 2022-04-05 DIAGNOSIS — I50.22 CHRONIC LEFT SYSTOLIC HEART FAILURE (HCC): ICD-10-CM

## 2022-04-05 DIAGNOSIS — Z95.810 BIVENTRICULAR ICD (IMPLANTABLE CARDIOVERTER-DEFIBRILLATOR) IN PLACE: ICD-10-CM

## 2022-04-05 DIAGNOSIS — I42.8 NONISCHEMIC CARDIOMYOPATHY (HCC): ICD-10-CM

## 2022-04-05 PROCEDURE — G2066 INTER DEVC REMOTE 30D: HCPCS | Performed by: NURSE PRACTITIONER

## 2022-04-05 PROCEDURE — 93297 REM INTERROG DEV EVAL ICPMS: CPT | Performed by: NURSE PRACTITIONER

## 2022-04-05 NOTE — PROGRESS NOTES
We received remote transmission from patient's CRT-D monitor at home. Transmission shows normal sensing and pacing function. Known elevated LV threshold w Adaptive output ON. No new arrhythmias/events recorded. Ap 0.3%  BiVp 98.2%, Effective 98.2%, VSRp 1.6%    Optivol is within normal range. NP will review. See interrogation under cardiology tab in the [address] [place] [address] [address] field for more details. Will continue to monitor remotely.

## 2022-05-24 ENCOUNTER — NURSE ONLY (OUTPATIENT)
Dept: CARDIOLOGY CLINIC | Age: 72
End: 2022-05-24
Payer: MEDICARE

## 2022-05-24 DIAGNOSIS — I42.8 NONISCHEMIC CARDIOMYOPATHY (HCC): ICD-10-CM

## 2022-05-24 DIAGNOSIS — Z95.810 BIVENTRICULAR ICD (IMPLANTABLE CARDIOVERTER-DEFIBRILLATOR) IN PLACE: ICD-10-CM

## 2022-05-24 DIAGNOSIS — I50.22 CHRONIC SYSTOLIC (CONGESTIVE) HEART FAILURE (HCC): ICD-10-CM

## 2022-05-24 PROCEDURE — 93296 REM INTERROG EVL PM/IDS: CPT | Performed by: INTERNAL MEDICINE

## 2022-05-24 PROCEDURE — 93295 DEV INTERROG REMOTE 1/2/MLT: CPT | Performed by: INTERNAL MEDICINE

## 2022-05-24 PROCEDURE — 93297 REM INTERROG DEV EVAL ICPMS: CPT | Performed by: INTERNAL MEDICINE

## 2022-05-24 NOTE — PROGRESS NOTES
We received remote transmission from patient's CRT-D monitor at home. Transmission shows normal sensing and pacing function. Known elevated LV threshold w Adaptive output ON. No new arrhythmias/events recorded. Ap 0.2%  BiVp 98.2%, Effective 98.2%, VSRp 1.6%    Optivol is within normal range. EP will review. See interrogation under cardiology tab in the [address] [place] [address] [address] field for more details. Will continue to monitor remotely.

## 2022-06-08 NOTE — TELEPHONE ENCOUNTER
Medication Refill    Medication needing refilled: XARELTO    Dosage of the medication: 20mg    How are you taking this medication (QD, BID, TID, QID, PRN):TAKE 1 TABLET BY MOUTH DAILY WITH SUPPER    30 or 90 day supply called in: 90    When will you run out of your medication: now     Pharmacy is [facility]/ [address] [facility] 285 654 [facility] 14[address] [facility], [address]  [address],[facility] 206 # [phone]

## 2022-06-28 ENCOUNTER — NURSE ONLY (OUTPATIENT)
Dept: CARDIOLOGY CLINIC | Age: 72
End: 2022-06-28
Payer: MEDICARE

## 2022-06-28 DIAGNOSIS — Z95.810 BIVENTRICULAR ICD (IMPLANTABLE CARDIOVERTER-DEFIBRILLATOR) IN PLACE: ICD-10-CM

## 2022-06-28 DIAGNOSIS — I42.8 NONISCHEMIC CARDIOMYOPATHY (HCC): ICD-10-CM

## 2022-06-28 DIAGNOSIS — I50.22 CHRONIC LEFT SYSTOLIC HEART FAILURE (HCC): ICD-10-CM

## 2022-06-28 PROCEDURE — G2066 INTER DEVC REMOTE 30D: HCPCS | Performed by: NURSE PRACTITIONER

## 2022-06-28 PROCEDURE — 93297 REM INTERROG DEV EVAL ICPMS: CPT | Performed by: NURSE PRACTITIONER

## 2022-06-28 NOTE — PROGRESS NOTES
We received remote transmission from patient's CRT-D monitor at home. Transmission shows normal sensing and pacing function. Known elevated LV threshold w Adaptive output ON. No new arrhythmias/events recorded. Ap 0.3%  BiVp 98.2%, Effective 98.2%, VSRp 1.6%    Optivol is within normal range. TriageHF Heart Failure Risk Status on 28-Jun-2022 is Medium*    NP will review. See interrogation under cardiology tab in the [address] [place] [address] [address] field for more details. Will continue to monitor remotely.      (End of 31-day monitoring period 6/28/22)

## 2022-08-19 DIAGNOSIS — I73.9 PAD (PERIPHERAL ARTERY DISEASE) (HCC): ICD-10-CM

## 2022-08-19 DIAGNOSIS — I10 ESSENTIAL HYPERTENSION: ICD-10-CM

## 2022-08-19 RX ORDER — ATORVASTATIN CALCIUM 10 MG/1
TABLET, FILM COATED ORAL
Qty: 90 TABLET | Refills: 0 | Status: CANCELLED | OUTPATIENT
Start: 2022-08-19

## 2022-09-14 DIAGNOSIS — I10 ESSENTIAL HYPERTENSION: ICD-10-CM

## 2022-09-14 DIAGNOSIS — I73.9 PAD (PERIPHERAL ARTERY DISEASE) (HCC): ICD-10-CM

## 2022-09-19 RX ORDER — ATORVASTATIN CALCIUM 10 MG/1
TABLET, FILM COATED ORAL
Qty: 30 TABLET | Refills: 0 | Status: SHIPPED | OUTPATIENT
Start: 2022-09-19 | End: 2022-10-28

## 2022-09-19 NOTE — TELEPHONE ENCOUNTER
Last OV: 03/15/2022  Next OV: x  Most recent Labs: no recent       Called pt to let her know we need labs done. Pt understood and states she will get them done next week. Labs have been placed.

## 2022-10-05 DIAGNOSIS — I73.9 PAD (PERIPHERAL ARTERY DISEASE) (HCC): ICD-10-CM

## 2022-10-05 DIAGNOSIS — I10 ESSENTIAL HYPERTENSION: ICD-10-CM

## 2022-10-05 LAB
CHOLESTEROL, FASTING: 126 MG/DL (ref 0–199)
HDLC SERPL-MCNC: 55 MG/DL (ref 40–60)
LDL CHOLESTEROL CALCULATED: 52 MG/DL
TRIGLYCERIDE, FASTING: 95 MG/DL (ref 0–150)
VLDLC SERPL CALC-MCNC: 19 MG/DL

## 2022-10-18 ENCOUNTER — NURSE ONLY (OUTPATIENT)
Dept: CARDIOLOGY CLINIC | Age: 72
End: 2022-10-18
Payer: MEDICARE

## 2022-10-18 DIAGNOSIS — Z95.810 BIVENTRICULAR ICD (IMPLANTABLE CARDIOVERTER-DEFIBRILLATOR) IN PLACE: ICD-10-CM

## 2022-10-18 DIAGNOSIS — I42.8 NONISCHEMIC CARDIOMYOPATHY (HCC): ICD-10-CM

## 2022-10-18 DIAGNOSIS — I50.22 CHRONIC LEFT SYSTOLIC HEART FAILURE (HCC): Primary | ICD-10-CM

## 2022-10-18 PROCEDURE — 93296 REM INTERROG EVL PM/IDS: CPT | Performed by: INTERNAL MEDICINE

## 2022-10-18 PROCEDURE — 93297 REM INTERROG DEV EVAL ICPMS: CPT | Performed by: NURSE PRACTITIONER

## 2022-10-18 PROCEDURE — 93295 DEV INTERROG REMOTE 1/2/MLT: CPT | Performed by: INTERNAL MEDICINE

## 2022-10-18 NOTE — PROGRESS NOTES
Manual remote transmission received from patient's CRT-D monitor at home. Transmission shows normal sensing and pacing function. Known elevated LV threshold w Adaptive output ON. No new arrhythmias/events recorded. EP physician will review. Ap 0.3%  BiVp 98.1%, Effective 98.1%, VSRp 1.6%  PVCs 3.3/hr    Optivol is within normal range. TriageHF Heart Failure Risk Status on 18-Oct-2022 is Medium*. NP will review. End of 91-day monitoring period 10/18/22. See interrogation under cardiology tab in the [address] [place] [address] [address] field for more details. Will continue to monitor remotely.

## 2022-10-28 DIAGNOSIS — I73.9 PAD (PERIPHERAL ARTERY DISEASE) (HCC): ICD-10-CM

## 2022-10-28 DIAGNOSIS — I10 ESSENTIAL HYPERTENSION: ICD-10-CM

## 2022-10-28 RX ORDER — ATORVASTATIN CALCIUM 10 MG/1
TABLET, FILM COATED ORAL
Qty: 30 TABLET | Refills: 11 | Status: SHIPPED | OUTPATIENT
Start: 2022-10-28

## 2022-10-28 NOTE — TELEPHONE ENCOUNTER
Last O/V:  03/15/2022  Next O/V:  None  Last Labs:  Lipid  10/05/2022  Last refill: 2022  Last EK2021

## 2022-11-08 RX ORDER — DAPAGLIFLOZIN 10 MG/1
TABLET, FILM COATED ORAL
Qty: 90 TABLET | Refills: 0 | Status: SHIPPED | OUTPATIENT
Start: 2022-11-08

## 2022-11-08 NOTE — TELEPHONE ENCOUNTER
Last O/V:  11/09/2021  Next O/V:  NONE    Last Labs:  LIPID:  10/05/2022                      BMP:  07/01/2020

## 2022-11-22 ENCOUNTER — NURSE ONLY (OUTPATIENT)
Dept: CARDIOLOGY CLINIC | Age: 72
End: 2022-11-22
Payer: MEDICARE

## 2022-11-22 DIAGNOSIS — I42.8 NONISCHEMIC CARDIOMYOPATHY (HCC): ICD-10-CM

## 2022-11-22 DIAGNOSIS — Z95.810 BIVENTRICULAR ICD (IMPLANTABLE CARDIOVERTER-DEFIBRILLATOR) IN PLACE: Primary | ICD-10-CM

## 2022-11-22 DIAGNOSIS — I50.23 ACUTE ON CHRONIC SYSTOLIC HEART FAILURE, NYHA CLASS 3 (HCC): ICD-10-CM

## 2022-11-22 DIAGNOSIS — I47.29 NSVT (NONSUSTAINED VENTRICULAR TACHYCARDIA): ICD-10-CM

## 2022-11-22 DIAGNOSIS — I50.22 CHRONIC LEFT SYSTOLIC HEART FAILURE (HCC): ICD-10-CM

## 2022-11-22 PROCEDURE — 93297 REM INTERROG DEV EVAL ICPMS: CPT | Performed by: INTERNAL MEDICINE

## 2022-11-22 PROCEDURE — G2066 INTER DEVC REMOTE 30D: HCPCS | Performed by: INTERNAL MEDICINE

## 2022-11-23 NOTE — PROGRESS NOTES
Remote transmission received from patient's CRT-D monitor at home. Transmission shows normal sensing and pacing function. Known elevated LV threshold w Adaptive output ON. No new arrhythmias/events recorded. Ap 0.1%  BiVp 98.3%, Effective 98.2%, VSRp 1.5%  PVCs 0.8/hr    Optivol is within normal range. TriageHF Heart Failure Risk Status on 22-Nov-2022 is Medium*. End of 31-day monitoring period 11/22/22. EP physician will review. See interrogation under cardiology tab in the [address] [place] [address] [address] field for more details. Will continue to monitor remotely.

## 2022-11-24 DIAGNOSIS — I10 ESSENTIAL HYPERTENSION: ICD-10-CM

## 2022-11-28 RX ORDER — CARVEDILOL 12.5 MG/1
TABLET ORAL
Qty: 180 TABLET | Refills: 3 | Status: SHIPPED | OUTPATIENT
Start: 2022-11-28

## 2022-12-12 ENCOUNTER — TELEPHONE (OUTPATIENT)
Dept: CARDIOLOGY CLINIC | Age: 72
End: 2022-12-12

## 2022-12-13 RX ORDER — SACUBITRIL AND VALSARTAN 97; 103 MG/1; MG/1
TABLET, FILM COATED ORAL
Qty: 180 TABLET | Refills: 0 | Status: SHIPPED | OUTPATIENT
Start: 2022-12-13

## 2022-12-19 RX ORDER — SACUBITRIL AND VALSARTAN 97; 103 MG/1; MG/1
1 TABLET, FILM COATED ORAL 2 TIMES DAILY
Qty: 180 TABLET | Refills: 5 | Status: SHIPPED | OUTPATIENT
Start: 2022-12-19

## 2022-12-22 NOTE — TELEPHONE ENCOUNTER
Patient Education     RICE     Rest an injury, elevate it, and use ice and compression as directed.   RICE stands for rest, ice, compression, and elevation. These can limit pain and swelling after an injury. RICE may be recommended to help treat fractures, sprains, strains, and bruises or bumps.   Home care  The following explain the details of RICE:  · Rest. Limit the use of the injured body part. This helps prevent further damage to the body part and gives it time to heal. In some cases, you may need a sling, brace, splint, or cast to help keep the body part still until it has healed.  · Ice. Applying ice right after an injury helps relieve pain and swelling. Wrap a bag of ice in a thin towel. Then, place it over the injured area. Do this for 10 to 15 minutes every 3 to 4 hours. Continue for the next 1 to 3 days or until your symptoms improve. Never put ice directly on your skin or ice an area longer than 15 minutes at a time.  · Compression. Putting pressure on an injury helps reduce swelling and provides support. Wrap the injured area firmly with an elastic bandage/wrap. Make sure not to wrap the bandage too tightly or you will cut off blood flow to the injured area. If your bandage loosens, rewrap it.  · Elevation. Keeping an injury raised above the level of your heart reduces swelling, pain, and throbbing. For instance, if you have a broken leg, it may help to rest your leg on several pillows when sitting or lying down. Try to keep the injured area elevated for at least 2 to 3 hours per day.  Follow-up care  Follow up with your healthcare provider, or as advised.  When to seek medical advice  Call your healthcare provider right away if any of these occur:  · Fever of 100.4°F (38°C) or higher, or as directed by your healthcare provider  · Increased pain or swelling in the injured body part  · Injured body part becomes cold, blue, numb, or tingly  · Signs of infection. These include warmth in the skin, redness,  Patient assistant has been faxed , Signed and scanned to patient chart .  Please be advise drainage, or bad smell coming from the injured body part.  Date Last Reviewed: 1/18/2016  © 6714-2407 [employer]. [address] [place] [address], [place] [address]. All rights reserved. This information is not intended as a substitute for professional medical care. Always follow your healthcare professional's instructions.           ________________________________________________________________    Thank you for visiting the [facility], [place].    Please understand this is a provisional diagnosis that can and may change. The diagnosis that you are discharged with is based on the symptoms you described and the diagnostic information available today. If any new symptoms occur or worsen, you should seek immediate re-evaluation at the nearest emergency department. If any symptoms persist, please follow up for reassessment with your primary care provider.     It is difficult to recognize all elements of any illness or injury in a single visit. The examination, treatment, and x-rays received are on a preliminary basis only. A radiologist will also review your x-rays for final reading. Call your primary care provider if you have questions or problems before your next appointment. If you are unable to  reach your Primary Care Provider (PCP), please call or return to the Urgent Care Clinic.  If symptoms worsen or do not resolve please follow-up with your Primary Care Provider (PCP), urgent care or the nearest  emergency room for emergency symptoms.  If you are unsure of whom to follow up with, call [phone] and ask to speak with either your PCP, the walk-In nurse or the on-call provider if you are calling after hours.      If you are referred to a specialist or scheduled for a test, our referrals department will call you with your appointment date and time within 3 business days. If you have not heard from them in this time frame, please call [phone] and ask for the referrals department.      Test results: Unless otherwise instructed, you should be notified of test results within one week. Please call our office if you do not hear from us within this time frame at [phone].     Clinic Hours:  Monday through Friday: 7:00 am to 7:00 pm  Saturday: 7:00 am to 3:00 pm  Sunday: 7:00 am to 3:00 pm  Holiday hours may vary, please call [phone] on Holidays.  Walk-in is open 365 days a year!    Help us to grow our quality of service! We want to improve - and you can help!  You may receive a survey in the mail.  This is your opportunity to tell us what excellent service you received, and where we could use improvement.  We value your input!     Interested in decreasing your wait time in the Urgent Care Clinic?  Same day reservations can now be made on line.  Go to [email or web address] to see the wait times at each [facility] and to make a reservation at the site that is most convenient for you. We will do our best to honor your reservation time but please understand that wait times are subject to change once you arrive at the clinic.    [facility].  Raman Workman PA-C

## 2022-12-24 ENCOUNTER — NURSE ONLY (OUTPATIENT)
Dept: CARDIOLOGY CLINIC | Age: 72
End: 2022-12-24
Payer: MEDICARE

## 2022-12-24 DIAGNOSIS — Z95.810 BIVENTRICULAR ICD (IMPLANTABLE CARDIOVERTER-DEFIBRILLATOR) IN PLACE: Primary | ICD-10-CM

## 2022-12-24 DIAGNOSIS — I42.8 NONISCHEMIC CARDIOMYOPATHY (HCC): ICD-10-CM

## 2022-12-24 DIAGNOSIS — I47.29 NSVT (NONSUSTAINED VENTRICULAR TACHYCARDIA): ICD-10-CM

## 2022-12-24 DIAGNOSIS — I50.22 CHRONIC LEFT SYSTOLIC HEART FAILURE (HCC): ICD-10-CM

## 2022-12-24 PROCEDURE — 93297 REM INTERROG DEV EVAL ICPMS: CPT | Performed by: INTERNAL MEDICINE

## 2022-12-24 PROCEDURE — G2066 INTER DEVC REMOTE 30D: HCPCS | Performed by: INTERNAL MEDICINE

## 2022-12-27 NOTE — PROGRESS NOTES
Manual remote transmission received from patient's CRT-D monitor at home. Transmission shows normal sensing and pacing function. Known elevated LV threshold w Adaptive output ON. Noted NSVT (Coreg). Ap 0.2%  BiVp 98.2%, Effective 98.2%, VSRp 1.6%  PVCs 1.0/hr    Optivol is within normal range. TriageHF Heart Failure Risk Status on 24-Dec-2022 is Medium*. End of 31-day monitoring period 12/24/22. EP physician will review. See interrogation under cardiology tab in the [address] [place] [address] [address] field for more details. Will continue to monitor remotely.

## 2023-01-09 NOTE — TELEPHONE ENCOUNTER
Medication Refill    Medication needing refilled: rivaroxaban (XARELTO)    Dosage of the medication: 20 MG TABS tablet     How are you taking this medication (QD, BID, TID, QID, PRN): TAKE 1 TABLET BY MOUTH DAILY WITH SUPPER    30 or 90 day supply called in: 90     When will you run out of your medication: 01/12/2023    Which Pharmacy are we sending the medication to?:    [id number] - [address], [place] [address] [address]   Phone:  [phone]  Fax:  [phone]

## 2023-01-12 ENCOUNTER — TELEPHONE (OUTPATIENT)
Dept: CARDIOLOGY CLINIC | Age: 73
End: 2023-01-12

## 2023-01-12 NOTE — TELEPHONE ENCOUNTER
Medication Samples    Medication:  rivaroxaban (XARELTO)     Dosage of the medication:  20 MG TABS tablet     How are you taking this medication (QD, BID, TID, QID, PRN):  TAKE 1 TABLET BY MOUTH DAILY WITH SUPPER     in the office or Mail to your home?     You can reach [place] at #[phone]

## 2023-01-18 NOTE — TELEPHONE ENCOUNTER
Called / spoke to inform her we didn't have sample at the time and  I provided patient with Xarelto # [phone] patient states she got her prescription filled .

## 2023-01-24 ENCOUNTER — NURSE ONLY (OUTPATIENT)
Dept: CARDIOLOGY CLINIC | Age: 73
End: 2023-01-24
Payer: MEDICARE

## 2023-01-24 DIAGNOSIS — I50.22 CHRONIC LEFT SYSTOLIC HEART FAILURE (HCC): ICD-10-CM

## 2023-01-24 DIAGNOSIS — I50.23 ACUTE ON CHRONIC SYSTOLIC HEART FAILURE, NYHA CLASS 3 (HCC): ICD-10-CM

## 2023-01-24 DIAGNOSIS — I42.8 NONISCHEMIC CARDIOMYOPATHY (HCC): ICD-10-CM

## 2023-01-24 DIAGNOSIS — Z95.810 BIVENTRICULAR ICD (IMPLANTABLE CARDIOVERTER-DEFIBRILLATOR) IN PLACE: Primary | ICD-10-CM

## 2023-01-24 DIAGNOSIS — I47.29 NSVT (NONSUSTAINED VENTRICULAR TACHYCARDIA): ICD-10-CM

## 2023-01-24 PROCEDURE — 93296 REM INTERROG EVL PM/IDS: CPT | Performed by: INTERNAL MEDICINE

## 2023-01-24 PROCEDURE — 93297 REM INTERROG DEV EVAL ICPMS: CPT | Performed by: NURSE PRACTITIONER

## 2023-01-24 PROCEDURE — 93295 DEV INTERROG REMOTE 1/2/MLT: CPT | Performed by: INTERNAL MEDICINE

## 2023-01-25 NOTE — PROGRESS NOTES
Remote transmission received from patient's CRT-D monitor at home. Transmission shows normal sensing and pacing function. Known elevated LV threshold w Adaptive output ON. No new arrhythmias/events recorded. EP physician will review. Ap 0.1%  BiVp 98.2%, Effective 98.1%, VSRp 1.6%  PVCs 3.0/hr    Optivol is within normal range. TriageHF Heart Failure Risk Status on 24-Jan-2023 is Medium*. NP will review. End of 91-day monitoring period 1/24/23. See interrogation under cardiology tab in the [address] [place] [address] [address] field for more details. Will continue to monitor remotely.

## 2023-03-13 RX ORDER — DAPAGLIFLOZIN 10 MG/1
TABLET, FILM COATED ORAL
Qty: 90 TABLET | Refills: 0 | Status: SHIPPED | OUTPATIENT
Start: 2023-03-13

## 2023-03-13 NOTE — TELEPHONE ENCOUNTER
Medication:   Requested Prescriptions     Pending Prescriptions Disp Refills    FARXIGA 10 MG tablet [Pharmacy Med Name: Hemanth Snyder 10MG TABLETS] 90 tablet 0     Sig: TAKE 1 TABLET BY MOUTH EVERY MORNING     Last Filled:  #90 on 11/08/22    Last appt: 3/15/2022   Next appt: 3/14/2023    Last OARRS: No flowsheet data found.

## 2023-03-14 ENCOUNTER — OFFICE VISIT (OUTPATIENT)
Dept: CARDIOLOGY CLINIC | Age: 73
End: 2023-03-14
Payer: MEDICARE

## 2023-03-14 VITALS
HEART RATE: 80 BPM | HEIGHT: 66 IN | SYSTOLIC BLOOD PRESSURE: 110 MMHG | BODY MASS INDEX: 47.09 KG/M2 | WEIGHT: 293 LBS | OXYGEN SATURATION: 96 % | DIASTOLIC BLOOD PRESSURE: 70 MMHG

## 2023-03-14 DIAGNOSIS — Z95.810 BIVENTRICULAR ICD (IMPLANTABLE CARDIOVERTER-DEFIBRILLATOR) IN PLACE: ICD-10-CM

## 2023-03-14 DIAGNOSIS — Z86.711 HISTORY OF PULMONARY EMBOLUS (PE): ICD-10-CM

## 2023-03-14 DIAGNOSIS — E66.01 MORBID OBESITY WITH BMI OF 45.0-49.9, ADULT (HCC): ICD-10-CM

## 2023-03-14 DIAGNOSIS — I47.29 NSVT (NONSUSTAINED VENTRICULAR TACHYCARDIA): ICD-10-CM

## 2023-03-14 DIAGNOSIS — I50.22 CHRONIC LEFT SYSTOLIC HEART FAILURE (HCC): Primary | ICD-10-CM

## 2023-03-14 DIAGNOSIS — I73.9 PAD (PERIPHERAL ARTERY DISEASE) (HCC): ICD-10-CM

## 2023-03-14 DIAGNOSIS — F17.218 CIGARETTE NICOTINE DEPENDENCE WITH OTHER NICOTINE-INDUCED DISORDER: ICD-10-CM

## 2023-03-14 DIAGNOSIS — I10 ESSENTIAL HYPERTENSION: ICD-10-CM

## 2023-03-14 PROCEDURE — G8427 DOCREV CUR MEDS BY ELIG CLIN: HCPCS | Performed by: INTERNAL MEDICINE

## 2023-03-14 PROCEDURE — 3017F COLORECTAL CA SCREEN DOC REV: CPT | Performed by: INTERNAL MEDICINE

## 2023-03-14 PROCEDURE — 1090F PRES/ABSN URINE INCON ASSESS: CPT | Performed by: INTERNAL MEDICINE

## 2023-03-14 PROCEDURE — 93000 ELECTROCARDIOGRAM COMPLETE: CPT | Performed by: INTERNAL MEDICINE

## 2023-03-14 PROCEDURE — 99214 OFFICE O/P EST MOD 30 MIN: CPT | Performed by: INTERNAL MEDICINE

## 2023-03-14 PROCEDURE — 4004F PT TOBACCO SCREEN RCVD TLK: CPT | Performed by: INTERNAL MEDICINE

## 2023-03-14 PROCEDURE — G8417 CALC BMI ABV UP PARAM F/U: HCPCS | Performed by: INTERNAL MEDICINE

## 2023-03-14 PROCEDURE — 1123F ACP DISCUSS/DSCN MKR DOCD: CPT | Performed by: INTERNAL MEDICINE

## 2023-03-14 PROCEDURE — 3074F SYST BP LT 130 MM HG: CPT | Performed by: INTERNAL MEDICINE

## 2023-03-14 PROCEDURE — 3078F DIAST BP <80 MM HG: CPT | Performed by: INTERNAL MEDICINE

## 2023-03-14 PROCEDURE — G8484 FLU IMMUNIZE NO ADMIN: HCPCS | Performed by: INTERNAL MEDICINE

## 2023-03-14 PROCEDURE — G8400 PT W/DXA NO RESULTS DOC: HCPCS | Performed by: INTERNAL MEDICINE

## 2023-03-14 NOTE — PROGRESS NOTES
[facility]      Cardiology Progress Note    Yari Bob  [birth year]    March 14, 2023      Reason for Visit: CHF    CC: \"Nothing different. \"     HPI:  The patient is 67 y.o. female with a past medical history significant for COPD, CHF, PE, and HTN who presents today for management of heart failure. She presented to the hospital 11/2018 with complaints of progressive shortness of breath. The NUGENT worsened over several weeks to months but acutely worsened in the few days prior to admission. She was also diagnosed with COPD. The echo showed an EF of 30%. She had an angiogram in 2004 which showed a reduced EF but she was unaware of that finding. She was diuresed and the shortness of breath improved. She was admitted to [facility] 8/7/19 and was found to have a PE. She was discharged home on Xarelto. Her repeat echo showed an LVEF of 25%. She was evaluated by Dr. Angela Oliva and underwent BiV-ICD placement 9/9/19. The following day her device was interrogated and her CS lead was dislodged. She was taken to the cath lab for a lead revision 9/10/19. Today, she presents for follow up and accompanied by family. She is using rolling walker for walking aid. She currently denies any new cardiac complaints since our last visit. She has chronic dyspnea on exertion but denies changes. The patient denies exertional chest pain/pressure, dyspnea at rest, PND, orthopnea, palpitations, lightheadedness, weight changes, changes in LE edema, and syncope. Remote monitoring of her device has been completed and scheduled in the future. She admits to currently medical therapy and currently tolerating. She does note [place] is cost expensive but is willing to continue. She completed routine labs 12/2022 [facility].      Past Medical History:   Diagnosis Date    CHF (congestive heart failure) (HCC)     COPD (chronic obstructive pulmonary disease) ([address]     Hypertension      Past Surgical History:   Procedure Laterality Date CARPAL TUNNEL RELEASE      CHOLECYSTECTOMY      KNEE SURGERY      PACEMAKER INSERTION      PACEMAKER PLACEMENT      TONSILLECTOMY      TUBAL LIGATION       Family History   Problem Relation Age of Onset    Hypertension Sister      Social History     Tobacco Use    Smoking status: Every Day     Packs/day: 0.50     Types: Cigarettes    Smokeless tobacco: Never   Vaping Use    Vaping Use: Never used   Substance Use Topics    Alcohol use: No    Drug use: No     Allergies   Allergen Reactions    Pcn [Penicillins] Shortness Of Breath    Sulfa Antibiotics Shortness Of Breath    Asa [Aspirin] Swelling    Shellfish Allergy Swelling    Tramadol Nausea And Vomiting     Current Outpatient Medications   Medication Sig Dispense Refill    FARXIGA 10 MG tablet TAKE 1 TABLET BY MOUTH EVERY MORNING 90 tablet 0    rivaroxaban (XARELTO) 20 MG TABS tablet TAKE 1 TABLET BY MOUTH DAILY WITH SUPPER 90 tablet 0    sacubitril-valsartan (ENTRESTO)  MG per tablet Take 1 tablet by mouth 2 times daily 180 tablet 5    carvedilol (COREG) 12.5 MG tablet TAKE 1 TABLET BY MOUTH  TWICE DAILY WITH MEALS 180 tablet 3    atorvastatin (LIPITOR) 10 MG tablet TAKE 1 TABLET BY MOUTH AT  NIGHT 30 tablet 11    furosemide (LASIX) 20 MG tablet TAKE 1 TABLET BY MOUTH AS  NEEDED FOR WEIGHT GAIN 90 tablet 3    acetaminophen (TYLENOL) 500 MG tablet Take 2 tablets by mouth every 6 hours as needed for Pain Max 4,000mg total acetaminophen per 24 hours. 30 tablet 0    gabapentin (NEURONTIN) 600 MG tablet Take 600 mg by mouth 3 times daily.       Probiotic Product (PROBIOTIC DAILY PO) Take by mouth every other day      loratadine (CLARITIN) 10 MG tablet Take 10 mg by mouth daily as needed      magnesium (MAGNESIUM-OXIDE) 250 MG TABS tablet Take 250 mg by mouth daily      vitamin D (CHOLECALCIFEROL) 5000 units CAPS capsule Take 5,000 Units by mouth daily      Multiple Vitamins-Minerals (CENTRUM SILVER PO) Take 1 tablet by mouth daily      latanoprost (XALATAN) 0.005 % ophthalmic solution Place 1 drop into both eyes nightly      albuterol-ipratropium (COMBIVENT RESPIMAT)  MCG/ACT AERS inhaler Inhale 1 puff into the lungs every 6 hours as needed for Wheezing or Shortness of Breath      hydrocortisone 2.5 % cream Apply 1 each topically 3 times daily as needed (itching/rash)        No current facility-administered medications for this visit. Review of Systems:  Constitutional: no unanticipated weight loss. There's been no change in energy level, sleep pattern, or activity level. No fevers, chills. Eyes: No visual changes or diplopia. No scleral icterus. ENT: No Headaches, hearing loss or vertigo. No mouth sores or sore throat. Cardiovascular: as reviewed in HPI  Respiratory: No cough or wheezing, no sputum production. No hematemesis. Gastrointestinal: No abdominal pain, appetite loss, blood in stools. No change in bowel or bladder habits. Genitourinary: No dysuria, trouble voiding, or hematuria. Musculoskeletal:  No gait disturbance, no joint complaints. Integumentary: No rash or pruritis. Neurological: No headache, diplopia, change in muscle strength, numbness or tingling. Psychiatric: No anxiety or depression. Endocrine: No temperature intolerance. No excessive thirst, fluid intake, or urination. No tremor. Hematologic/Lymphatic: No abnormal bruising or bleeding, blood clots or swollen lymph nodes. Allergic/Immunologic: No nasal congestion or hives. Physical Exam:   /70   Pulse 80   Ht 5' 6\" (1.676 m)   Wt 296 lb (134.3 kg)   SpO2 96%   BMI 47.78 kg/m²   Wt Readings from Last 3 Encounters:   03/14/23 296 lb (134.3 kg)   03/15/22 286 lb (129.7 kg)   11/09/21 281 lb (127.5 kg)     Constitutional: The patient is an obese female who is oriented to person, place, and time. In no acute distress. Head: Normocephalic and atraumatic. Pupils equal and round. Neck: Neck supple. No JVP or carotid bruit appreciated.  No mass and no thyromegaly present. No lymphadenopathy present. Cardiovascular: Normal rate. Normal heart sounds. Exam reveals no gallop and no friction rub. No murmur heard. Pulmonary/Chest: Effort normal and breath sounds normal. No respiratory distress. No wheezes, rhonchi or rales. Abdominal: Soft, non-tender. Bowel sounds are normal. Exhibits no organomegaly, mass or bruit. Extremities: No edema. No cyanosis or clubbing. Pulses are 2+ radial and carotid bilaterally. Neurological: No gross cranial nerve deficit. Coordination normal.   Skin: Skin is warm and dry. There is no rash or diaphoresis. Psychiatric: Patient has a normal mood and affect. Speech is normal and behavior is normal.     Lab Review:    Care Everywhere 12/2022 [facility] reviewed and stable. Lab Results   Component Value Date/Time    TRIG 81 11/27/2018 08:37 AM    HDL 55 10/05/2022 09:07 AM    LDLCALC 52 10/05/2022 09:07 AM    LABVLDL 19 10/05/2022 09:07 AM     Lab Results   Component Value Date/Time    BUN 20 09/11/2019 06:42 AM    CREATININE 1.2 09/11/2019 06:42 AM     EKG Interpretation:   11/26/18 Reviewed, Sinus rhythm with premature supraventricular complexes. Left bundle branch block  11/9/21 V-paced  3/14/23 V-paced    Imaging:  Cath: 2004 ([facility])  The left main coronary artery was angiographically normal.   The left anterior descending artery is widely patent, tapers relatively quickly, but is without significant disease. The circumflex coronary artery is a dominant vessel and is angiographically normal.  The right coronary artery is small, technically nondominant, and is angiographically normal.  A left ventriculogram shows mild diffuse hypokinesis. LVEF is estimated at 45%. There is no mitral insufficiency. There is no gradient on pullback across the aortic valve. Abd CT: 10/2015  Atherosclerotic calcifications are present within   the abdominal aorta and proximal iliac arteries.       PFT: 10/2018 ([facility])  The patient has mild obstructive lung disease with no bronchodilator response seen. Mild restrictive defect. Clinical and radiographic correlation is needed, although this can be explained on the basis of obesity since ERV is reduced to 16%. Minimal reduction in DLCO    Echo: 11/28/18  Suboptimal image quality. Definity contrast administered. Left ventricular cavity size is mildly dilated. Normal left ventricular wall thickness. Overall left ventricular systolic function appears severely reduced. Ejection fraction is visually estimated to be 30%. There is moderate diffuse hypokinesis with severe hypokinesis of the septal wall and apex of the left ventricle. Normal right ventricular size and function. TAPSE measures 21mm. Moderate-to-severe tricuspid regurgitation. No evidence of tricuspid stenosis. IVC not well visualized. Estimated pulmonary artery systolic pressure is normal at 40 mmHg assuming a right atrial pressure of 3 mmHg    Echo: 5/16/19   LV is dilated. LV Ejection fraction is visually estimated to be 20-25%. Severe global hypokinesis with dysynchrony lateral and septal walls. Mild MR. The left atrium is dilated. Assessment and plan:   1) Chronic systolic heart failure. EF 25%. S/p Biv-ICD 9/9/19. Appears compensated. NYHA II-III. Etiology likely non-ischemic with remote normal cath and EF low at that time. Device interrogation 1/24/23 showed normal functioning and Optivol in normal range. Continue Entresto  mg bid, Coreg 25mg BID, Farxiga, and Lasix PRN. Continue monitoring daily weights. Encouraged a low sodium diet. Not on aldactone with intermittent hypotension. Will repeat echocardiogram.     2) PAD. Asymptomatic. Continue with medical management and risk factor modification including. Continue Lipitor. ASA listed in allergies. 3) Unprovoked PE. Diagnosed 8/2019. Sister also with unprovoked PEs. Continue Xarelto. 4) COPD/Nicotine Addiction. Encouraged smoking cessation.  May also have chronic NUGENT related to COPD.    5) NSVT. Continue B-blocker. Asymptomatic. Continue to monitor with routine device interrogations. 6) Essential hypertension. Controlled. Goal BP <130/80. Continue medical therapy. 7) Morbid obesity. BMI 47.7. Encouraged weight loss. 8) Chronic pain/arthritis. Instructed to avoid the use of NSAIDs if possible. Acetaminophen is preferred agent but will defer pain management to PCP and orthopedics. Follow up in 6 months. Thank you very much for allowing me to participate in the care of your patient. Please do not hesitate to contact me if you have any questions. Sincerely,  Zac De La Rosa. Sagar Anand, [facility], [address] [place] [address]  Ph: [phone]  Fax: [phone]    This note was scribed in the presence of Dr. Rae Albert MD by Deisi Stacy RN. Physician Attestation: The scribes documentation has been prepared under my direction and personally reviewed by me in its entirety. I confirm that the note above accurately reflects all work, treatment, procedures, and medical decision making performed by me. All portions of the note including but not limited to the chief complaint, history of present illness, physical exam, assessment and plan/medical decision making were personally reviewed, edited, and updated on the day of the visit.

## 2023-03-21 ENCOUNTER — NURSE ONLY (OUTPATIENT)
Dept: CARDIOLOGY CLINIC | Age: 73
End: 2023-03-21

## 2023-03-21 DIAGNOSIS — I50.23 ACUTE ON CHRONIC SYSTOLIC HEART FAILURE, NYHA CLASS 3 (HCC): ICD-10-CM

## 2023-03-21 DIAGNOSIS — I42.8 NONISCHEMIC CARDIOMYOPATHY (HCC): ICD-10-CM

## 2023-03-21 DIAGNOSIS — Z95.810 BIVENTRICULAR ICD (IMPLANTABLE CARDIOVERTER-DEFIBRILLATOR) IN PLACE: Primary | ICD-10-CM

## 2023-03-21 DIAGNOSIS — I50.22 CHRONIC LEFT SYSTOLIC HEART FAILURE (HCC): ICD-10-CM

## 2023-03-22 NOTE — PROGRESS NOTES
Remote transmission received from patient's CRT-D monitor at home. Transmission shows normal sensing and pacing function. Known elevated LV threshold w Adaptive output ON. No new arrhythmias/events recorded. Ap <0.1%  BiVp 98.3%, Effective 98.3%, VSRp 1.6%  PVCs 0.3/hr    Optivol is within normal range. TriageHF Heart Failure Risk Status on 21-Mar-2023 is Medium*. End of 31-day monitoring period 3/21/23. NP will review. See interrogation under cardiology tab in the [address] [place] [address] [address] field for more details. Will continue to monitor remotely.

## 2023-03-24 DIAGNOSIS — I10 ESSENTIAL HYPERTENSION: ICD-10-CM

## 2023-03-24 RX ORDER — CARVEDILOL 12.5 MG/1
12.5 TABLET ORAL 2 TIMES DAILY WITH MEALS
Qty: 90 TABLET | Refills: 7 | Status: SHIPPED | OUTPATIENT
Start: 2023-03-24

## 2023-03-24 NOTE — TELEPHONE ENCOUNTER
Pt called said she did not get her home delivery for her  CARVEDILOL. She called them and they told her to request a 30 day again because they never received the original one.         Medication Refill    Medication needing refilled: CARVEDILOL    Dosage of the medication: 12.5 mg    How are you taking this medication (QD, BID, TID, QID, PRN):TAKE 1 TABLET BY MOUTH  TWICE DAILY WITH MEALS    30 or 90 day supply called in:30    When will you run out of your medication: today was her last pill     Which Pharmacy are we sending the medication to?:  [facility] ([facility] ) - Ela Henning [address] [place] [address],[address] [facility] [phone]            [facility] # [phone]

## 2023-05-09 ENCOUNTER — NURSE ONLY (OUTPATIENT)
Dept: CARDIOLOGY CLINIC | Age: 73
End: 2023-05-09
Payer: MEDICARE

## 2023-05-09 DIAGNOSIS — I42.8 NONISCHEMIC CARDIOMYOPATHY (HCC): ICD-10-CM

## 2023-05-09 DIAGNOSIS — I50.22 CHRONIC LEFT SYSTOLIC HEART FAILURE (HCC): ICD-10-CM

## 2023-05-09 DIAGNOSIS — Z95.810 BIVENTRICULAR ICD (IMPLANTABLE CARDIOVERTER-DEFIBRILLATOR) IN PLACE: Primary | ICD-10-CM

## 2023-05-09 PROCEDURE — 93297 REM INTERROG DEV EVAL ICPMS: CPT | Performed by: INTERNAL MEDICINE

## 2023-05-09 PROCEDURE — 93295 DEV INTERROG REMOTE 1/2/MLT: CPT | Performed by: INTERNAL MEDICINE

## 2023-05-09 PROCEDURE — 93296 REM INTERROG EVL PM/IDS: CPT | Performed by: INTERNAL MEDICINE

## 2023-05-16 NOTE — PROGRESS NOTES
Remote transmission received from patient's CRT-D monitor at home. Transmission shows normal sensing and pacing function. Known elevated LV threshold w Adaptive output ON. No new arrhythmias/events recorded. Ap 0.2%  BiVp 98.2%, Effective 98.2%, VSRp 1.7%  PVCs 0.2/hr    Optivol is within normal range. TriageHF Heart Failure Risk Status on 09-May-2023 is Medium*. EP will review. See interrogation under cardiology tab in the [address] [place] [address] [address] field for more details. Will continue to monitor remotely. (End of 91-day monitoring period 5/9/23).

## 2023-05-20 ENCOUNTER — HOSPITAL ENCOUNTER (EMERGENCY)
Age: 73
Discharge: HOME | End: 2023-05-20
Attending: EMERGENCY MEDICINE
Payer: MEDICARE

## 2023-05-20 VITALS
BODY MASS INDEX: 47.09 KG/M2 | HEART RATE: 81 BPM | WEIGHT: 293 LBS | HEIGHT: 66 IN | RESPIRATION RATE: 16 BRPM | SYSTOLIC BLOOD PRESSURE: 100 MMHG | OXYGEN SATURATION: 93 % | TEMPERATURE: 98.1 F | DIASTOLIC BLOOD PRESSURE: 70 MMHG

## 2023-05-20 DIAGNOSIS — L72.3 SEBACEOUS CYST: ICD-10-CM

## 2023-05-20 DIAGNOSIS — L02.91 ABSCESS: Primary | ICD-10-CM

## 2023-05-20 PROCEDURE — 87070 CULTURE OTHR SPECIMN AEROBIC: CPT

## 2023-05-20 PROCEDURE — 99283 EMERGENCY DEPT VISIT LOW MDM: CPT

## 2023-05-20 PROCEDURE — 87077 CULTURE AEROBIC IDENTIFY: CPT

## 2023-05-20 PROCEDURE — 87205 SMEAR GRAM STAIN: CPT

## 2023-05-20 PROCEDURE — 10060 I&D ABSCESS SIMPLE/SINGLE: CPT

## 2023-05-20 RX ORDER — DOXYCYCLINE HYCLATE 100 MG
100 TABLET ORAL 2 TIMES DAILY
Qty: 20 TABLET | Refills: 0 | Status: SHIPPED | OUTPATIENT
Start: 2023-05-20 | End: 2023-05-30

## 2023-05-20 ASSESSMENT — PAIN SCALES - GENERAL: PAINLEVEL_OUTOF10: 2

## 2023-05-20 NOTE — ED NOTES
Pt arrives ambulatory to ED with c/o abscess on center on chest x a couple weeks. Pt states for the past week she has had drainage and itching.       Neida Barrett RN  05/20/23 [id number]

## 2023-05-20 NOTE — ED PROVIDER NOTES
[facility] ENCOUNTER        Pt Name: Steven Zheng  MRN: [id number]  Juanagfarnav 1950  Date of evaluation: 5/20/2023  Provider: Rosenda Alvarez PA-C  PCP: Nina Sloan  Note Started: 2:37 PM EDT 5/20/23       I have seen and evaluated this patient with my supervising physician Angela Morocho MD.      PROCEDURES   Unless otherwise noted below, none     Incision/Drainage    Date/Time: 5/20/2023 2:38 PM  Performed by: Rosenda Alvarez PA-C  Authorized by:  Angela Morocho MD     Consent:     Consent obtained:  Verbal    Consent given by:  Patient    Risks discussed:  Damage to other organs, bleeding, incomplete drainage, infection and pain    Alternatives discussed:  No treatment  Universal protocol:     Patient identity confirmed:  Verbally with patient  Location:     Type:  Abscess    Location:  Trunk    Trunk location:  Chest  Anesthesia:     Anesthesia method:  Local infiltration    Local anesthetic:  Lidocaine 1% w/o epi  Procedure type:     Complexity:  Simple  Procedure details:     Incision types:  Single straight    Incision depth:  Subcutaneous    Drainage:  Purulent and bloody (Caseous)    Packing materials:  1/4 in iodoform gauze  Post-procedure details:     Procedure completion:  Tolerated well, no immediate complications              Rosenda Alvarez PA-C  05/20/23 1439
Violence: Not on file   Housing Stability: Not on file     No current facility-administered medications for this encounter. Current Outpatient Medications   Medication Sig Dispense Refill    doxycycline hyclate (VIBRA-TABS) 100 MG tablet Take 1 tablet by mouth 2 times daily for 10 days 20 tablet 0    carvedilol (COREG) 12.5 MG tablet Take 1 tablet by mouth 2 times daily (with meals) 90 tablet 7    FARXIGA 10 MG tablet TAKE 1 TABLET BY MOUTH EVERY MORNING 90 tablet 0    rivaroxaban (XARELTO) 20 MG TABS tablet TAKE 1 TABLET BY MOUTH DAILY WITH SUPPER 90 tablet 0    sacubitril-valsartan (ENTRESTO)  MG per tablet Take 1 tablet by mouth 2 times daily 180 tablet 5    atorvastatin (LIPITOR) 10 MG tablet TAKE 1 TABLET BY MOUTH AT  NIGHT 30 tablet 11    furosemide (LASIX) 20 MG tablet TAKE 1 TABLET BY MOUTH AS  NEEDED FOR WEIGHT GAIN 90 tablet 3    acetaminophen (TYLENOL) 500 MG tablet Take 2 tablets by mouth every 6 hours as needed for Pain Max 4,000mg total acetaminophen per 24 hours. 30 tablet 0    gabapentin (NEURONTIN) 600 MG tablet Take 600 mg by mouth 3 times daily.       Probiotic Product (PROBIOTIC DAILY PO) Take by mouth every other day      loratadine (CLARITIN) 10 MG tablet Take 10 mg by mouth daily as needed      magnesium (MAGNESIUM-OXIDE) 250 MG TABS tablet Take 250 mg by mouth daily      vitamin D (CHOLECALCIFEROL) 5000 units CAPS capsule Take 5,000 Units by mouth daily      Multiple Vitamins-Minerals (CENTRUM SILVER PO) Take 1 tablet by mouth daily      latanoprost (XALATAN) 0.005 % ophthalmic solution Place 1 drop into both eyes nightly      albuterol-ipratropium (COMBIVENT RESPIMAT)  MCG/ACT AERS inhaler Inhale 1 puff into the lungs every 6 hours as needed for Wheezing or Shortness of Breath      hydrocortisone 2.5 % cream Apply 1 each topically 3 times daily as needed (itching/rash)        Allergies   Allergen Reactions    Pcn [Penicillins] Shortness Of Breath    Sulfa Antibiotics Shortness

## 2023-05-21 LAB
BACTERIA SPEC AEROBE CULT: NORMAL
GRAM STN SPEC: NORMAL

## 2023-05-23 LAB
BACTERIA SPEC AEROBE CULT: ABNORMAL
GRAM STN SPEC: ABNORMAL
ORGANISM: ABNORMAL

## 2023-05-24 ENCOUNTER — TELEPHONE (OUTPATIENT)
Dept: CARDIOLOGY CLINIC | Age: 73
End: 2023-05-24

## 2023-05-25 ENCOUNTER — NURSE ONLY (OUTPATIENT)
Dept: CARDIOLOGY CLINIC | Age: 73
End: 2023-05-25
Payer: MEDICARE

## 2023-05-25 DIAGNOSIS — I50.23 ACUTE ON CHRONIC SYSTOLIC HEART FAILURE, NYHA CLASS 3 (HCC): ICD-10-CM

## 2023-05-25 DIAGNOSIS — Z95.810 BIVENTRICULAR ICD (IMPLANTABLE CARDIOVERTER-DEFIBRILLATOR) IN PLACE: Primary | ICD-10-CM

## 2023-05-25 DIAGNOSIS — I47.29 NSVT (NONSUSTAINED VENTRICULAR TACHYCARDIA) (HCC): ICD-10-CM

## 2023-05-25 DIAGNOSIS — I42.8 NONISCHEMIC CARDIOMYOPATHY (HCC): ICD-10-CM

## 2023-05-25 DIAGNOSIS — I50.22 CHRONIC LEFT SYSTOLIC HEART FAILURE (HCC): ICD-10-CM

## 2023-05-25 PROCEDURE — 93284 PRGRMG EVAL IMPLANTABLE DFB: CPT | Performed by: INTERNAL MEDICINE

## 2023-06-27 ENCOUNTER — NURSE ONLY (OUTPATIENT)
Dept: CARDIOLOGY CLINIC | Age: 73
End: 2023-06-27

## 2023-06-27 DIAGNOSIS — Z95.810 BIVENTRICULAR ICD (IMPLANTABLE CARDIOVERTER-DEFIBRILLATOR) IN PLACE: Primary | ICD-10-CM

## 2023-06-27 DIAGNOSIS — I50.22 CHRONIC LEFT SYSTOLIC HEART FAILURE (HCC): ICD-10-CM

## 2023-06-27 DIAGNOSIS — I42.8 NONISCHEMIC CARDIOMYOPATHY (HCC): ICD-10-CM

## 2023-07-07 NOTE — TELEPHONE ENCOUNTER
Last OV: 3/14/23  Next OV: 9/18/23  Last refill: 1/16/23  #90  Most recent Labs: 12/28/22  in Care everywhere  Last EKG (if needed): 3/14/23

## 2023-08-08 ENCOUNTER — TELEPHONE (OUTPATIENT)
Dept: CARDIOLOGY CLINIC | Age: 73
End: 2023-08-08

## 2023-08-08 NOTE — TELEPHONE ENCOUNTER
Received fax request for RX for PT's [employer] patient assistance. Completed request and faxed back to [facility]. Scanned fax confirmation to PT's chart.

## 2023-09-12 ENCOUNTER — HOSPITAL ENCOUNTER (OUTPATIENT)
Dept: NON INVASIVE DIAGNOSTICS | Age: 73
Discharge: HOME | End: 2023-09-12
Payer: MEDICARE

## 2023-09-12 DIAGNOSIS — I50.22 CHRONIC LEFT SYSTOLIC HEART FAILURE (HCC): ICD-10-CM

## 2023-09-12 PROCEDURE — 6360000004 HC RX CONTRAST MEDICATION: Performed by: INTERNAL MEDICINE

## 2023-09-12 PROCEDURE — C8929 TTE W OR WO FOL WCON,DOPPLER: HCPCS

## 2023-09-12 RX ADMIN — PERFLUTREN 1.5 ML: 6.52 INJECTION, SUSPENSION INTRAVENOUS at 11:18

## 2023-09-14 DIAGNOSIS — I73.9 PAD (PERIPHERAL ARTERY DISEASE) (HCC): ICD-10-CM

## 2023-09-14 DIAGNOSIS — I10 ESSENTIAL HYPERTENSION: ICD-10-CM

## 2023-09-15 NOTE — TELEPHONE ENCOUNTER
L  #30  11 R/Fast OV: 3/14/23  Next OV: 9/18/23  Last refill: 10/28/22  Most recent Labs: 12/28/22 in Care everywhere  Last EKG (if needed): 3/14/23

## 2023-09-18 ENCOUNTER — OFFICE VISIT (OUTPATIENT)
Dept: CARDIOLOGY CLINIC | Age: 73
End: 2023-09-18
Payer: MEDICARE

## 2023-09-18 VITALS
DIASTOLIC BLOOD PRESSURE: 70 MMHG | HEART RATE: 80 BPM | OXYGEN SATURATION: 96 % | SYSTOLIC BLOOD PRESSURE: 110 MMHG | HEIGHT: 66 IN | BODY MASS INDEX: 45.96 KG/M2 | WEIGHT: 286 LBS

## 2023-09-18 DIAGNOSIS — Z95.810 BIVENTRICULAR ICD (IMPLANTABLE CARDIOVERTER-DEFIBRILLATOR) IN PLACE: ICD-10-CM

## 2023-09-18 DIAGNOSIS — I73.9 PAD (PERIPHERAL ARTERY DISEASE) (HCC): ICD-10-CM

## 2023-09-18 DIAGNOSIS — I50.22 CHRONIC SYSTOLIC (CONGESTIVE) HEART FAILURE (HCC): Primary | ICD-10-CM

## 2023-09-18 DIAGNOSIS — I50.22 CHRONIC SYSTOLIC (CONGESTIVE) HEART FAILURE (HCC): ICD-10-CM

## 2023-09-18 DIAGNOSIS — I47.29 NSVT (NONSUSTAINED VENTRICULAR TACHYCARDIA) (HCC): ICD-10-CM

## 2023-09-18 DIAGNOSIS — F17.218 CIGARETTE NICOTINE DEPENDENCE WITH OTHER NICOTINE-INDUCED DISORDER: ICD-10-CM

## 2023-09-18 LAB
ALBUMIN SERPL-MCNC: 3.8 G/DL (ref 3.4–5)
ALBUMIN/GLOB SERPL: 1.1 {RATIO} (ref 1.1–2.2)
ALP SERPL-CCNC: 78 U/L (ref 40–129)
ALT SERPL-CCNC: 9 U/L (ref 10–40)
ANION GAP SERPL CALCULATED.3IONS-SCNC: 11 MMOL/L (ref 3–16)
AST SERPL-CCNC: 16 U/L (ref 15–37)
BILIRUB SERPL-MCNC: 0.8 MG/DL (ref 0–1)
BUN SERPL-MCNC: 9 MG/DL (ref 7–20)
CALCIUM SERPL-MCNC: 9.2 MG/DL (ref 8.3–10.6)
CHLORIDE SERPL-SCNC: 106 MMOL/L (ref 99–110)
CHOLEST SERPL-MCNC: 112 MG/DL (ref 0–199)
CO2 SERPL-SCNC: 25 MMOL/L (ref 21–32)
CREAT SERPL-MCNC: 0.9 MG/DL (ref 0.6–1.2)
DEPRECATED RDW RBC AUTO: 16.7 % (ref 12.4–15.4)
GFR SERPLBLD CREATININE-BSD FMLA CKD-EPI: >60 ML/MIN/{1.73_M2}
GLUCOSE SERPL-MCNC: 93 MG/DL (ref 70–99)
HCT VFR BLD AUTO: 38.9 % (ref 36–48)
HDLC SERPL-MCNC: 46 MG/DL (ref 40–60)
HGB BLD-MCNC: 12.6 G/DL (ref 12–16)
LDL CHOLESTEROL CALCULATED: 40 MG/DL
MCH RBC QN AUTO: 30.6 PG (ref 26–34)
MCHC RBC AUTO-ENTMCNC: 32.5 G/DL (ref 31–36)
MCV RBC AUTO: 94.1 FL (ref 80–100)
PLATELET # BLD AUTO: 210 K/UL (ref 135–450)
PMV BLD AUTO: 8.1 FL (ref 5–10.5)
POTASSIUM SERPL-SCNC: 4.3 MMOL/L (ref 3.5–5.1)
PROT SERPL-MCNC: 7.3 G/DL (ref 6.4–8.2)
RBC # BLD AUTO: 4.14 M/UL (ref 4–5.2)
SODIUM SERPL-SCNC: 142 MMOL/L (ref 136–145)
TRIGL SERPL-MCNC: 129 MG/DL (ref 0–150)
VLDLC SERPL CALC-MCNC: 26 MG/DL
WBC # BLD AUTO: 8.1 K/UL (ref 4–11)

## 2023-09-18 PROCEDURE — 3078F DIAST BP <80 MM HG: CPT | Performed by: INTERNAL MEDICINE

## 2023-09-18 PROCEDURE — G8427 DOCREV CUR MEDS BY ELIG CLIN: HCPCS | Performed by: INTERNAL MEDICINE

## 2023-09-18 PROCEDURE — 93296 REM INTERROG EVL PM/IDS: CPT | Performed by: INTERNAL MEDICINE

## 2023-09-18 PROCEDURE — 3074F SYST BP LT 130 MM HG: CPT | Performed by: INTERNAL MEDICINE

## 2023-09-18 PROCEDURE — 4004F PT TOBACCO SCREEN RCVD TLK: CPT | Performed by: INTERNAL MEDICINE

## 2023-09-18 PROCEDURE — 99214 OFFICE O/P EST MOD 30 MIN: CPT | Performed by: INTERNAL MEDICINE

## 2023-09-18 PROCEDURE — 93295 DEV INTERROG REMOTE 1/2/MLT: CPT | Performed by: INTERNAL MEDICINE

## 2023-09-18 PROCEDURE — G8417 CALC BMI ABV UP PARAM F/U: HCPCS | Performed by: INTERNAL MEDICINE

## 2023-09-18 PROCEDURE — G8400 PT W/DXA NO RESULTS DOC: HCPCS | Performed by: INTERNAL MEDICINE

## 2023-09-18 PROCEDURE — 3017F COLORECTAL CA SCREEN DOC REV: CPT | Performed by: INTERNAL MEDICINE

## 2023-09-18 PROCEDURE — 1123F ACP DISCUSS/DSCN MKR DOCD: CPT | Performed by: INTERNAL MEDICINE

## 2023-09-18 PROCEDURE — 1090F PRES/ABSN URINE INCON ASSESS: CPT | Performed by: INTERNAL MEDICINE

## 2023-09-18 RX ORDER — ATORVASTATIN CALCIUM 10 MG/1
TABLET, FILM COATED ORAL
Qty: 90 TABLET | Refills: 3 | Status: SHIPPED | OUTPATIENT
Start: 2023-09-18

## 2023-09-18 NOTE — PROGRESS NOTES
radiographic correlation is needed, although this can be explained on the basis of obesity since ERV is reduced to 16%. Minimal reduction in DLCO    Echo: 11/28/18  Suboptimal image quality. Definity contrast administered. Left ventricular cavity size is mildly dilated. Normal left ventricular wall thickness. Overall left ventricular systolic function appears severely reduced. Ejection fraction is visually estimated to be 30%. There is moderate diffuse hypokinesis with severe hypokinesis of the septal wall and apex of the left ventricle. Normal right ventricular size and function. TAPSE measures 21mm. Moderate-to-severe tricuspid regurgitation. No evidence of tricuspid stenosis. IVC not well visualized. Estimated pulmonary artery systolic pressure is normal at 40 mmHg assuming a right atrial pressure of 3 mmHg    Echo: 5/16/19   LV is dilated. LV Ejection fraction is visually estimated to be 20-25%. Severe global hypokinesis with dysynchrony lateral and septal walls. Mild MR. The left atrium is dilated. Echo: 9/14/23  Ejection fraction is visually estimated to be 40 %. There is moderate akinesis of the mid inferior septal wall. Definity contrast administered. The right ventricle is borderline enlarged. Pacer / ICD wire is visualized in the right ventricle. Mild MR    Assessment and plan:   1) Chronic systolic heart failure. S/p Biv-ICD 9/9/19. EF 25%, improved to 40% per echo 9/14/23. Appears compensated. NYHA II-III. Etiology likely non-ischemic with remote normal cath and EF low at that time. Device interrogation today showed normal functioning. Continue Entresto  mg bid, Coreg 25mg BID, Farxiga, and Lasix PRN. Will obtain routine lab work. Continue monitoring daily weights. Encouraged a low sodium diet. Not on aldactone with intermittent hypotension. 2) PAD. Asymptomatic. Continue with medical management and risk factor modification including. Continue Lipitor.  ASA listed in

## 2023-09-19 ENCOUNTER — TELEPHONE (OUTPATIENT)
Dept: CARDIOLOGY CLINIC | Age: 73
End: 2023-09-19

## 2023-10-19 PROCEDURE — 93297 REM INTERROG DEV EVAL ICPMS: CPT | Performed by: NURSE PRACTITIONER

## 2023-10-19 PROCEDURE — G2066 INTER DEVC REMOTE 30D: HCPCS | Performed by: NURSE PRACTITIONER

## 2023-11-20 PROCEDURE — G2066 INTER DEVC REMOTE 30D: HCPCS | Performed by: NURSE PRACTITIONER

## 2023-11-20 PROCEDURE — 93297 REM INTERROG DEV EVAL ICPMS: CPT | Performed by: NURSE PRACTITIONER

## 2023-12-11 ENCOUNTER — TELEPHONE (OUTPATIENT)
Dept: CARDIOLOGY CLINIC | Age: 73
End: 2023-12-11

## 2023-12-11 NOTE — TELEPHONE ENCOUNTER
Erika Gruber stop in to drop off her PT. ASSIST paperwork. I place it in the PT ASSIST folder for  to fill out.       Erika Gruber can be reached at [phone]

## 2023-12-12 NOTE — TELEPHONE ENCOUNTER
STEFF for patient to bring in the rest of her patient assistance forms so we can fax them altogether

## 2023-12-13 ENCOUNTER — TELEPHONE (OUTPATIENT)
Dept: CARDIOLOGY CLINIC | Age: 73
End: 2023-12-13

## 2023-12-13 NOTE — TELEPHONE ENCOUNTER
Elvira Huggins stop in to drop off the rest of her PT. ASSIST. Paperwork. I place them in the PT. ASSIST folder.

## 2023-12-18 PROCEDURE — 93296 REM INTERROG EVL PM/IDS: CPT | Performed by: INTERNAL MEDICINE

## 2023-12-18 PROCEDURE — 93295 DEV INTERROG REMOTE 1/2/MLT: CPT | Performed by: INTERNAL MEDICINE

## 2023-12-21 PROCEDURE — 93297 REM INTERROG DEV EVAL ICPMS: CPT | Performed by: NURSE PRACTITIONER

## 2023-12-21 PROCEDURE — G2066 INTER DEVC REMOTE 30D: HCPCS | Performed by: NURSE PRACTITIONER

## 2024-01-21 PROCEDURE — 93297 REM INTERROG DEV EVAL ICPMS: CPT | Performed by: NURSE PRACTITIONER

## 2024-02-23 RX ORDER — DAPAGLIFLOZIN 10 MG/1
10 TABLET, FILM COATED ORAL EVERY MORNING
Qty: 90 TABLET | Refills: 1 | Status: SHIPPED | OUTPATIENT
Start: 2024-02-23

## 2024-02-23 NOTE — TELEPHONE ENCOUNTER
Last O/V:  9/18/23  Next O/V:  none ( due for 6 mo f/u)  Last Refill: 6/12/23  Last Labs:  CMP:  9/18/23  Last EKG:  3/14/23    Needs to schd appt for more refills.

## 2024-02-23 NOTE — TELEPHONE ENCOUNTER
Medication Refill    Medication needing refilled:  FARXIGA     Dosage of the medication:  10 MG tablet     How are you taking this medication (QD, BID, TID, QID, PRN):  TAKE 1 TABLET BY MOUTH EVERY MORNING     30 or 90 day supply called in:  90 day supply    When will you run out of your medication:  Using new pharmacy. Angie with [facility] states that insurance will not cover Generic and wants to know if [facility] will prescribe name brand medication instead, due to cost and co-pay coverage?     Which Pharmacy are we sending the medication to?:  [facility] - [place], [place] - [address] [place] [address] [phone] - F [phone]

## 2024-03-08 ENCOUNTER — TELEPHONE (OUTPATIENT)
Dept: CARDIOLOGY CLINIC | Age: 74
End: 2024-03-08

## 2024-03-08 NOTE — TELEPHONE ENCOUNTER
Received letter from [facility] is not longer on patient's drug plan, but Jardiance 10 mg daily is. Will switch to Jardiance - spoke with patient, she is agreeable. Advised to call back if there is issues with cost. She v/u.

## 2024-03-28 DIAGNOSIS — I10 ESSENTIAL HYPERTENSION: ICD-10-CM

## 2024-03-29 RX ORDER — CARVEDILOL 12.5 MG/1
12.5 TABLET ORAL 2 TIMES DAILY WITH MEALS
Qty: 180 TABLET | Refills: 3 | Status: SHIPPED | OUTPATIENT
Start: 2024-03-29

## 2024-03-29 NOTE — TELEPHONE ENCOUNTER
Last OV: 9/18/23  Next OV: 4/16/24  Last refill: 3/24/23  #90  7 R/F  Most recent Labs: 9/18/23  Last EKG (if needed):

## 2024-04-16 ENCOUNTER — OFFICE VISIT (OUTPATIENT)
Dept: CARDIOLOGY CLINIC | Age: 74
End: 2024-04-16
Payer: MEDICARE

## 2024-04-16 VITALS
BODY MASS INDEX: 42.91 KG/M2 | SYSTOLIC BLOOD PRESSURE: 114 MMHG | WEIGHT: 267 LBS | HEART RATE: 81 BPM | OXYGEN SATURATION: 96 % | DIASTOLIC BLOOD PRESSURE: 80 MMHG | HEIGHT: 66 IN

## 2024-04-16 DIAGNOSIS — I50.22 CHRONIC SYSTOLIC (CONGESTIVE) HEART FAILURE (HCC): Primary | ICD-10-CM

## 2024-04-16 DIAGNOSIS — Z95.810 BIVENTRICULAR ICD (IMPLANTABLE CARDIOVERTER-DEFIBRILLATOR) IN PLACE: ICD-10-CM

## 2024-04-16 DIAGNOSIS — I47.29 NSVT (NONSUSTAINED VENTRICULAR TACHYCARDIA) (HCC): ICD-10-CM

## 2024-04-16 DIAGNOSIS — Z86.711 HISTORY OF PULMONARY EMBOLUS (PE): ICD-10-CM

## 2024-04-16 DIAGNOSIS — I73.9 PAD (PERIPHERAL ARTERY DISEASE) (HCC): ICD-10-CM

## 2024-04-16 PROCEDURE — G8427 DOCREV CUR MEDS BY ELIG CLIN: HCPCS | Performed by: INTERNAL MEDICINE

## 2024-04-16 PROCEDURE — 1123F ACP DISCUSS/DSCN MKR DOCD: CPT | Performed by: INTERNAL MEDICINE

## 2024-04-16 PROCEDURE — 3017F COLORECTAL CA SCREEN DOC REV: CPT | Performed by: INTERNAL MEDICINE

## 2024-04-16 PROCEDURE — G8417 CALC BMI ABV UP PARAM F/U: HCPCS | Performed by: INTERNAL MEDICINE

## 2024-04-16 PROCEDURE — 3079F DIAST BP 80-89 MM HG: CPT | Performed by: INTERNAL MEDICINE

## 2024-04-16 PROCEDURE — 99214 OFFICE O/P EST MOD 30 MIN: CPT | Performed by: INTERNAL MEDICINE

## 2024-04-16 PROCEDURE — 3074F SYST BP LT 130 MM HG: CPT | Performed by: INTERNAL MEDICINE

## 2024-04-16 PROCEDURE — 93000 ELECTROCARDIOGRAM COMPLETE: CPT | Performed by: INTERNAL MEDICINE

## 2024-04-16 PROCEDURE — G8400 PT W/DXA NO RESULTS DOC: HCPCS | Performed by: INTERNAL MEDICINE

## 2024-04-16 PROCEDURE — 1090F PRES/ABSN URINE INCON ASSESS: CPT | Performed by: INTERNAL MEDICINE

## 2024-04-16 PROCEDURE — 4004F PT TOBACCO SCREEN RCVD TLK: CPT | Performed by: INTERNAL MEDICINE

## 2024-04-16 NOTE — PROGRESS NOTES
[facility]      Cardiology Progress Note    Holli Ceja  1950 April 16, 2024      Reason for Visit: CHF    CC: \"I'm doing good\"     HPI:  The patient is 73 y.o. female with a past medical history significant for COPD, CHF, PE, and HTN who presents today for management of heart failure. She presented to the hospital 11/2018 with complaints of progressive shortness of breath. The NUGENT worsened over several weeks to months but acutely worsened in the few days prior to admission. She was also diagnosed with COPD. The echo showed an EF of 30%. She had an angiogram in 2004 which showed a reduced EF but she was unaware of that finding. She was diuresed and the shortness of breath improved. She was admitted to [facility] 8/7/19 and was found to have a PE. She was discharged home on Xarelto. Her repeat echo showed an LVEF of 25%. She was evaluated by Dr. Campos and underwent BiV-ICD placement 9/9/19. The following day her device was interrogated and her CS lead was dislodged. She was taken to the cath lab for a lead revision 9/10/19. Her repeat echocardiogram 9/12/23 revealed an improved EF of 40%. Today, she presents for follow up and accompanied by her daughter. She is using rolling walker for walking aid. She states that overall she is feeling well. She denies any new sounding cardiac complaints. She denies any chest pains or worsening shortness of breath. She reports medication compliance and is tolerating. She denies any abnormal bleeding or bruising. She denies exertional chest pain/pressure, dyspnea at rest, worsening NUGENT, PND, orthopnea, palpitations, lightheadedness, weight changes, changes in LE edema, and syncope.    Past Medical History:   Diagnosis Date    CHF (congestive heart failure) (HCC)     Hypertension      Past Surgical History:   Procedure Laterality Date    CARPAL TUNNEL RELEASE      CHOLECYSTECTOMY      KNEE SURGERY      PACEMAKER INSERTION      PACEMAKER PLACEMENT

## 2024-05-13 ENCOUNTER — TELEPHONE (OUTPATIENT)
Dept: CARDIOLOGY CLINIC | Age: 74
End: 2024-05-13

## 2024-05-15 NOTE — TELEPHONE ENCOUNTER
Last O/V:  24   Next O/V:  10/28/24   Last Refill: 23   Last Labs:  CMP & CBC:  23   Last EK24     Please sign again. Original script was sent to [facility] pharmacy

## 2024-05-15 NOTE — TELEPHONE ENCOUNTER
Patient [facility], and there should be a refill request coming from them.     Please verify document has been received.

## 2024-06-17 PROCEDURE — 93295 DEV INTERROG REMOTE 1/2/MLT: CPT | Performed by: INTERNAL MEDICINE

## 2024-06-17 PROCEDURE — 93296 REM INTERROG EVL PM/IDS: CPT | Performed by: INTERNAL MEDICINE

## 2024-06-18 NOTE — TELEPHONE ENCOUNTER
Medication Refill    Medication needing refilled:    empagliflozin (JARDIANCE)      Dosage of the medication:  10 MG tablet     How are you taking this medication (QD, BID, TID, QID, PRN):  Take 1 tablet by mouth daily     30 or 90 day supply called in:  30 days    When will you run out of your medication:  only has 1 pill left for today    Which Pharmacy are we sending the medication to?:  Patient states allan Bowens wants $500 for the script so now [facility]     [facility]  [facility] [address]  Phone: [phone]

## 2024-07-24 DIAGNOSIS — I73.9 PAD (PERIPHERAL ARTERY DISEASE) (HCC): ICD-10-CM

## 2024-07-24 DIAGNOSIS — I10 ESSENTIAL HYPERTENSION: ICD-10-CM

## 2024-07-25 RX ORDER — ATORVASTATIN CALCIUM 10 MG/1
TABLET, FILM COATED ORAL
Qty: 90 TABLET | Refills: 0 | Status: SHIPPED | OUTPATIENT
Start: 2024-07-25

## 2024-07-25 NOTE — TELEPHONE ENCOUNTER
Last OV: 4/16/24  Next OV: 10/28/24  Last refill: 9/18/23 #90 #3 R/F  Most recent Labs: 9/18/23  Last EKG (if needed): 4/16/24

## 2024-08-09 NOTE — TELEPHONE ENCOUNTER
Medication Refill    When was your last appointment with cardiology?    (If 1 yr or longer, please schedule appointment)    (If patient has been told they do not need to follow-up - medications should be filled by PCP)  4/24    When did you last have labs drawn?     Medication needing refilled?  rivaroxaban (XARELTO)     Dosage of the medication?  20 MG TABS tablet     How are you taking this medication (QD, BID, TID, QID, PRN)?  TAKE 1 TABLET BY MOUTH EVERY DAY WITH SUPPER     Do you want a 30 or 90 day supply?  Wants 30 day now instead of 90     When will you run out of your medication?     Which Pharmacy are we sending this medication to?  [id number] [place] [id number] [facility]     Pharmacy Phone:[phone]   Pharmacy Fax:[phone]

## 2024-08-09 NOTE — TELEPHONE ENCOUNTER
Last OV: 4/16/24  Next OV: 10/28/24  Last refill: 5/15/24 #90 3 R/F  Most recent Labs: 9/18/23  Last EKG (if needed): 4/16/24

## 2024-09-25 DIAGNOSIS — I73.9 PAD (PERIPHERAL ARTERY DISEASE) (HCC): ICD-10-CM

## 2024-09-25 DIAGNOSIS — I10 ESSENTIAL HYPERTENSION: ICD-10-CM

## 2024-09-26 RX ORDER — ATORVASTATIN CALCIUM 10 MG/1
TABLET, FILM COATED ORAL
Qty: 90 TABLET | Refills: 3 | Status: SHIPPED | OUTPATIENT
Start: 2024-09-26

## 2024-10-16 NOTE — PROGRESS NOTES
[facility]      Cardiology Progress Note    Holli Ceja  1950 October 28, 2024      Reason for Visit: CHF    CC: \"I have no issues\"     HPI:  The patient is 74 y.o. female with a past medical history significant for COPD, CHF, PE, and HTN who presents today for management of heart failure. She presented to the hospital 11/2018 with complaints of progressive shortness of breath. The NUGENT worsened over several weeks to months but acutely worsened in the few days prior to admission. She was also diagnosed with COPD. The echo showed an EF of 30%. She had an angiogram in 2004 which showed a reduced EF but she was unaware of that finding. She was diuresed and the shortness of breath improved. She was admitted to [facility] 8/7/19 and was found to have a PE. She was discharged home on Xarelto. Her repeat echo showed an LVEF of 25%. She was evaluated by Dr. Campos and underwent BiV-ICD placement 9/9/19. The following day her device was interrogated and her CS lead was dislodged. She was taken to the cath lab for a lead revision 9/10/19. Her repeat echocardiogram 9/12/23 revealed an improved EF of 40%.Today, she presents for follow up and accompanied by her daughter. She states that overall she is feeling well. She denies any new sounding cardiac complaints. She denies any chest pains or worsening shortness of breath. She reports medication compliance and is tolerating. She states she will be undergoing cataract surgery in the near future. She denies any abnormal bleeding or bruising. She denies exertional chest pain/pressure, dyspnea at rest, worsening NUGENT, PND, orthopnea, palpitations, lightheadedness, weight changes, changes in LE edema, and syncope.    Past Medical History:   Diagnosis Date    CHF (congestive heart failure) (HCC)     Hypertension      Past Surgical History:   Procedure Laterality Date    CARPAL TUNNEL RELEASE      CHOLECYSTECTOMY      KNEE SURGERY      PACEMAKER INSERTION

## 2024-10-26 PROCEDURE — 93297 REM INTERROG DEV EVAL ICPMS: CPT | Performed by: NURSE PRACTITIONER

## 2024-10-28 ENCOUNTER — TELEPHONE (OUTPATIENT)
Dept: CARDIOLOGY CLINIC | Age: 74
End: 2024-10-28

## 2024-10-28 ENCOUNTER — OFFICE VISIT (OUTPATIENT)
Dept: CARDIOLOGY CLINIC | Age: 74
End: 2024-10-28

## 2024-10-28 VITALS
WEIGHT: 258 LBS | OXYGEN SATURATION: 97 % | HEART RATE: 66 BPM | HEIGHT: 66 IN | DIASTOLIC BLOOD PRESSURE: 62 MMHG | SYSTOLIC BLOOD PRESSURE: 98 MMHG | BODY MASS INDEX: 41.46 KG/M2

## 2024-10-28 DIAGNOSIS — I10 ESSENTIAL HYPERTENSION: ICD-10-CM

## 2024-10-28 DIAGNOSIS — I42.8 NONISCHEMIC CARDIOMYOPATHY (HCC): ICD-10-CM

## 2024-10-28 DIAGNOSIS — Z95.810 BIVENTRICULAR ICD (IMPLANTABLE CARDIOVERTER-DEFIBRILLATOR) IN PLACE: ICD-10-CM

## 2024-10-28 DIAGNOSIS — E66.01 MORBID OBESITY WITH BMI OF 45.0-49.9, ADULT: ICD-10-CM

## 2024-10-28 DIAGNOSIS — I73.9 PAD (PERIPHERAL ARTERY DISEASE) (HCC): ICD-10-CM

## 2024-10-28 DIAGNOSIS — I50.22 CHRONIC LEFT SYSTOLIC HEART FAILURE (HCC): Primary | ICD-10-CM

## 2024-10-28 DIAGNOSIS — Z86.711 HISTORY OF PULMONARY EMBOLUS (PE): ICD-10-CM

## 2024-10-28 RX ORDER — SACUBITRIL AND VALSARTAN 97; 103 MG/1; MG/1
1 TABLET, FILM COATED ORAL 2 TIMES DAILY
Qty: 180 TABLET | Refills: 3 | Status: SHIPPED | OUTPATIENT
Start: 2024-10-28 | End: 2024-11-27 | Stop reason: SDUPTHER

## 2024-10-28 RX ORDER — FUROSEMIDE 20 MG/1
20 TABLET ORAL DAILY
Qty: 90 TABLET | Refills: 3 | Status: SHIPPED | OUTPATIENT
Start: 2024-10-28

## 2024-10-28 NOTE — TELEPHONE ENCOUNTER
Patient brought in patient assistance forms for [employer]- Entresto  mg  Forms filled out.    RX Entresto pending to print.

## 2024-11-06 ENCOUNTER — TELEPHONE (OUTPATIENT)
Dept: CARDIOLOGY CLINIC | Age: 74
End: 2024-11-06

## 2024-11-06 NOTE — TELEPHONE ENCOUNTER
CARDIAC CLEARANCE     What type of procedure are you having?  Cataracts right eye     Which physician is performing your procedure?  Dr. Logan    When is your procedure scheduled?  11/12/24    Where are you having this procedure?    Are you taking Blood Thinners?   If so what? (Name/dose/frequesncy)  no   Does the surgeon want you to stop your blood thinner?  If so for how long?  no  Are you having any new or worsening cardiac symptoms?     Phone Number and Contact Name for Physicians office:  [phone]    Fax number to send information:  [phone]    Cardiac History:  Last office visit with Cardiologist:  10/24    Cardiac Procedures:    Cardiac Testing:

## 2024-11-06 NOTE — TELEPHONE ENCOUNTER
Dr Logan is requesting advisement on Holli Ceja to undergo cataract right eye surgery.     Patient last seen in office on 10/28/24 for management of COPD, CHF, PE, and HTN who presents today for management of heart failure.    Pre-operative risk assessment done on 10/28/24 visit.     Pre-operative risk assessment. Patient is intermediate cardiac risk based on RCRI score of one (CHF). Patient's risk should not preclude her from proceeding with surgery. Suggest continuation of B-blocker and statin in the jose-operative period. DOAC may be held two days prior to surgery if necessary from surgeon's perspective prior to cataract surgery.     Called spoke with nurse Mildred RN  States patient does not need to hold Xarelto and doesn't need CC letter faxed.    She can see Dr Bowie notes from 10/28/24.    SANDEEP

## 2024-11-13 NOTE — TELEPHONE ENCOUNTER
Holli's callback: [phone]    Holli called wanting to go over patient assistance forms as she has f/u questions.  Please call when convenient.

## 2024-11-14 NOTE — TELEPHONE ENCOUNTER
Patient returned call.  States [facility] called patient states the Social Security form was wrong year.     Patient states that's the most recent SS form she has. She won't get an updated one till Jan/2025    I gave patient ph# [phone] to call Sarata.

## 2024-11-25 ENCOUNTER — TELEPHONE (OUTPATIENT)
Dept: CARDIOLOGY CLINIC | Age: 74
End: 2024-11-25

## 2024-11-25 NOTE — TELEPHONE ENCOUNTER
Holli called the office to relay that she has received her SS Awards letter. She will fax to the office, wanted to make MACHO Curran aware.    Gave front fax number, will scan to chart once received.

## 2024-11-25 NOTE — TELEPHONE ENCOUNTER
Thanks [facility] with [employer] for Entresto  mg.

## 2024-11-27 RX ORDER — SACUBITRIL AND VALSARTAN 97; 103 MG/1; MG/1
1 TABLET, FILM COATED ORAL 2 TIMES DAILY
Qty: 180 TABLET | Refills: 3 | Status: SHIPPED | OUTPATIENT
Start: 2024-11-27 | End: 2024-12-09 | Stop reason: CLARIF

## 2024-11-27 NOTE — TELEPHONE ENCOUNTER
Printed SS income.     RX Entresto  mg pending to print.    Patient assistance forms placed in Dr Bowie folder for signature.

## 2024-11-29 NOTE — TELEPHONE ENCOUNTER
Will have to wait till Monday 12/2/24 to get signature.     Called spoke with is aware and states if another Dr can't sign then she is okay to wait till Dr Bowie returns.

## 2024-12-09 RX ORDER — SACUBITRIL AND VALSARTAN 97; 103 MG/1; MG/1
1 TABLET, FILM COATED ORAL 2 TIMES DAILY
Qty: 180 TABLET | Refills: 3 | Status: SHIPPED | OUTPATIENT
Start: 2024-12-09

## 2024-12-09 NOTE — TELEPHONE ENCOUNTER
Patient would like to wait till Dr Bowie is back in office to sign off on patient assistance forms.     RX Entresto  mg pending to print under Dr Bowie name.

## 2025-01-15 DIAGNOSIS — I10 ESSENTIAL HYPERTENSION: ICD-10-CM

## 2025-01-16 RX ORDER — CARVEDILOL 12.5 MG/1
12.5 TABLET ORAL 2 TIMES DAILY WITH MEALS
Qty: 180 TABLET | Refills: 3 | Status: SHIPPED | OUTPATIENT
Start: 2025-01-16

## 2025-01-16 NOTE — TELEPHONE ENCOUNTER
Last OV: 10/28/24  Next OV: 4/28/25  Last refill: 3/29/24 #180 3 R/F  Most recent Labs: 9/12/24 in care everywhere  Last EKG (if needed): 4/16/24

## 2025-03-18 RX ORDER — EMPAGLIFLOZIN 10 MG/1
10 TABLET, FILM COATED ORAL DAILY
Qty: 90 TABLET | Refills: 3 | Status: SHIPPED | OUTPATIENT
Start: 2025-03-18

## 2025-03-18 NOTE — TELEPHONE ENCOUNTER
Last OV: 10/28/24  Next OV: 4/28/25  Last refill: 6/19/24 #90 3 R/F  Most recent Labs: 3/4/25 in care everywhere  Last EKG (if needed): 4/16/24

## 2025-04-17 ENCOUNTER — TELEPHONE (OUTPATIENT)
Dept: CARDIOLOGY CLINIC | Age: 75
End: 2025-04-17

## 2025-04-17 NOTE — TELEPHONE ENCOUNTER
There is no cardiac contraindication to colonoscopy.  No additional testing is required prior to the procedure.  Xarelto may be held 2 days prior to the procedure and resume when possible.

## 2025-04-17 NOTE — TELEPHONE ENCOUNTER
CARDIAC CLEARANCE     What type of procedure are you having?  COLONOSCOPY    Which physician is performing your procedure?  DR. WOMACK    When is your procedure scheduled?  TBD (4/24 OR 4/25)    Where are you having this procedure?  [facility]     Are you taking Blood Thinners?   If so what? (Name/dose/frequesncy)  rivaroxaban (XARELTO) 20 MG TABS tablet     Does the surgeon want you to stop your blood thinner?  If so for how long?  2 DAYS PRIOR    Are you having any new or worsening cardiac symptoms?   N/A    Phone Number and Contact Name for Physicians office:  [phone]    Fax number to send information:  [phone]    Cardiac History:  Last office visit with Cardiologist:  10/2024    Cardiac Procedures:    Cardiac Testing:

## 2025-04-17 NOTE — TELEPHONE ENCOUNTER
Dr. Bowie, please review and advise for a cardiac clearance. Thank you.     Patient will be undergoing a colonoscopy on possibly 4/24/25 or 4/25/25. Requesting to hold Xarelto for 2 days prior. History of unprovoked PE (2019), systolic CHF s/p ICD. Last OV note 10/28/24.

## 2025-04-24 NOTE — PROGRESS NOTES
[facility]      Cardiology Progress Note    Holli Ceja  1950 April 28, 2025      Reason for Visit: CHF    CC: \"I lost 3 more pounds.\"     HPI:  The patient is 74 y.o. female with a past medical history significant for COPD, CHF, PE, and HTN who presents today for management of heart failure. She presented to the hospital 11/2018 with complaints of progressive shortness of breath. The NUGENT worsened over several weeks to months but acutely worsened in the few days prior to admission. She was also diagnosed with COPD. The echo showed an EF of 30%. She had an angiogram in 2004 which showed a reduced EF but she was unaware of that finding. She was diuresed and the shortness of breath improved. She was admitted to [facility] 8/7/19 and was found to have a PE. She was discharged home on Xarelto. Her repeat echo showed an LVEF of 25%. She was evaluated by Dr. Campos and underwent BiV-ICD placement 9/9/19. The following day her device was interrogated and her CS lead was dislodged. She was taken to the cath lab for a lead revision 9/10/19. Her repeat echocardiogram 9/12/23 revealed an improved EF of 40%.  Today, she states overall he is doing well. She denies any new cardiac complaints and states she continues to remain active without any exertional symptoms. She denies any chest pains or worsening shortness of breath. She reports compliance with her medications and tolerating. She denies any abnormal bleeding or bruising. Patient denies exertional chest pain/pressure, dyspnea at rest, worsening NUGENT, PND, orthopnea, palpitations, lightheadedness, weight changes, changes in LE edema, and syncope.    Past Medical History:   Diagnosis Date    CHF (congestive heart failure) (HCC)     Hypertension      Past Surgical History:   Procedure Laterality Date    CARPAL TUNNEL RELEASE      CHOLECYSTECTOMY      KNEE SURGERY      PACEMAKER INSERTION      PACEMAKER PLACEMENT      TONSILLECTOMY      TUBAL

## 2025-04-28 ENCOUNTER — OFFICE VISIT (OUTPATIENT)
Dept: CARDIOLOGY CLINIC | Age: 75
End: 2025-04-28
Payer: MEDICARE

## 2025-04-28 VITALS
OXYGEN SATURATION: 98 % | HEIGHT: 66 IN | HEART RATE: 74 BPM | BODY MASS INDEX: 41.14 KG/M2 | SYSTOLIC BLOOD PRESSURE: 92 MMHG | WEIGHT: 256 LBS | DIASTOLIC BLOOD PRESSURE: 58 MMHG

## 2025-04-28 DIAGNOSIS — I42.8 NONISCHEMIC CARDIOMYOPATHY (HCC): ICD-10-CM

## 2025-04-28 DIAGNOSIS — F17.218 CIGARETTE NICOTINE DEPENDENCE WITH OTHER NICOTINE-INDUCED DISORDER: ICD-10-CM

## 2025-04-28 DIAGNOSIS — Z95.810 BIVENTRICULAR ICD (IMPLANTABLE CARDIOVERTER-DEFIBRILLATOR) IN PLACE: ICD-10-CM

## 2025-04-28 DIAGNOSIS — Z86.711 HISTORY OF PULMONARY EMBOLUS (PE): ICD-10-CM

## 2025-04-28 DIAGNOSIS — I73.9 PAD (PERIPHERAL ARTERY DISEASE): ICD-10-CM

## 2025-04-28 DIAGNOSIS — I50.22 CHRONIC LEFT SYSTOLIC HEART FAILURE (HCC): Primary | ICD-10-CM

## 2025-04-28 DIAGNOSIS — E66.01 MORBID OBESITY WITH BMI OF 45.0-49.9, ADULT (HCC): ICD-10-CM

## 2025-04-28 PROCEDURE — 1123F ACP DISCUSS/DSCN MKR DOCD: CPT | Performed by: INTERNAL MEDICINE

## 2025-04-28 PROCEDURE — G8417 CALC BMI ABV UP PARAM F/U: HCPCS | Performed by: INTERNAL MEDICINE

## 2025-04-28 PROCEDURE — 99214 OFFICE O/P EST MOD 30 MIN: CPT | Performed by: INTERNAL MEDICINE

## 2025-04-28 PROCEDURE — G8427 DOCREV CUR MEDS BY ELIG CLIN: HCPCS | Performed by: INTERNAL MEDICINE

## 2025-04-28 PROCEDURE — 1159F MED LIST DOCD IN RCRD: CPT | Performed by: INTERNAL MEDICINE

## 2025-04-28 PROCEDURE — 1090F PRES/ABSN URINE INCON ASSESS: CPT | Performed by: INTERNAL MEDICINE

## 2025-04-28 PROCEDURE — 3017F COLORECTAL CA SCREEN DOC REV: CPT | Performed by: INTERNAL MEDICINE

## 2025-04-28 PROCEDURE — 93000 ELECTROCARDIOGRAM COMPLETE: CPT | Performed by: INTERNAL MEDICINE

## 2025-04-28 PROCEDURE — 3074F SYST BP LT 130 MM HG: CPT | Performed by: INTERNAL MEDICINE

## 2025-04-28 PROCEDURE — 3078F DIAST BP <80 MM HG: CPT | Performed by: INTERNAL MEDICINE

## 2025-04-28 PROCEDURE — G8400 PT W/DXA NO RESULTS DOC: HCPCS | Performed by: INTERNAL MEDICINE

## 2025-04-28 PROCEDURE — G2211 COMPLEX E/M VISIT ADD ON: HCPCS | Performed by: INTERNAL MEDICINE

## 2025-04-28 PROCEDURE — 4004F PT TOBACCO SCREEN RCVD TLK: CPT | Performed by: INTERNAL MEDICINE

## 2025-07-01 PROCEDURE — 93297 REM INTERROG DEV EVAL ICPMS: CPT | Performed by: NURSE PRACTITIONER

## 2025-07-08 NOTE — TELEPHONE ENCOUNTER
Last OV: 25    Striet  Next OV: 25   Striet  Last Labs: 3/4/25 Care everywhere  Last EK25   Last Filled: 25    #90     1tab qd         Called pharmacy and confirmed through tech she does not have any refills

## 2025-08-30 DIAGNOSIS — I73.9 PAD (PERIPHERAL ARTERY DISEASE): ICD-10-CM

## 2025-08-30 DIAGNOSIS — I10 ESSENTIAL HYPERTENSION: ICD-10-CM

## 2025-09-02 RX ORDER — ATORVASTATIN CALCIUM 10 MG/1
10 TABLET, FILM COATED ORAL NIGHTLY
Qty: 90 TABLET | Refills: 3 | Status: SHIPPED | OUTPATIENT
Start: 2025-09-02